# Patient Record
Sex: FEMALE | Race: WHITE | NOT HISPANIC OR LATINO | Employment: FULL TIME | URBAN - METROPOLITAN AREA
[De-identification: names, ages, dates, MRNs, and addresses within clinical notes are randomized per-mention and may not be internally consistent; named-entity substitution may affect disease eponyms.]

---

## 2024-01-10 ENCOUNTER — HOSPITAL ENCOUNTER (INPATIENT)
Facility: HOSPITAL | Age: 59
LOS: 14 days | Discharge: HOME | DRG: 020 | End: 2024-01-24
Attending: PSYCHIATRY & NEUROLOGY | Admitting: PSYCHIATRY & NEUROLOGY
Payer: COMMERCIAL

## 2024-01-10 DIAGNOSIS — I67.1 CEREBRAL ANEURYSM: ICD-10-CM

## 2024-01-10 DIAGNOSIS — I67.1 ANTERIOR COMMUNICATING ARTERY ANEURYSM: Primary | ICD-10-CM

## 2024-01-10 DIAGNOSIS — I60.9 SAH (SUBARACHNOID HEMORRHAGE): ICD-10-CM

## 2024-01-10 PROBLEM — F33.41 RECURRENT MAJOR DEPRESSION IN PARTIAL REMISSION: Status: ACTIVE | Noted: 2024-01-10

## 2024-01-10 PROBLEM — E03.9 ACQUIRED HYPOTHYROIDISM: Status: ACTIVE | Noted: 2024-01-10

## 2024-01-10 PROBLEM — J45.20 MILD INTERMITTENT ASTHMA WITHOUT COMPLICATION: Status: ACTIVE | Noted: 2024-01-10

## 2024-01-10 PROBLEM — I10 ESSENTIAL HYPERTENSION: Status: ACTIVE | Noted: 2024-01-10

## 2024-01-10 PROBLEM — G44.53 THUNDERCLAP HEADACHE: Status: ACTIVE | Noted: 2024-01-10

## 2024-01-10 LAB
ALBUMIN SERPL BCP-MCNC: 3.6 G/DL (ref 3.5–5.2)
ALP SERPL-CCNC: 104 U/L (ref 55–135)
ALT SERPL W/O P-5'-P-CCNC: 80 U/L (ref 10–44)
ANION GAP SERPL CALC-SCNC: 11 MMOL/L (ref 8–16)
AST SERPL-CCNC: 68 U/L (ref 10–40)
BASOPHILS # BLD AUTO: 0.07 K/UL (ref 0–0.2)
BASOPHILS NFR BLD: 0.4 % (ref 0–1.9)
BILIRUB SERPL-MCNC: 0.4 MG/DL (ref 0.1–1)
BUN SERPL-MCNC: 13 MG/DL (ref 6–20)
CALCIUM SERPL-MCNC: 8.6 MG/DL (ref 8.7–10.5)
CHLORIDE SERPL-SCNC: 112 MMOL/L (ref 95–110)
CHOLEST SERPL-MCNC: 209 MG/DL (ref 120–199)
CHOLEST/HDLC SERPL: 5.2 {RATIO} (ref 2–5)
CO2 SERPL-SCNC: 16 MMOL/L (ref 23–29)
CREAT SERPL-MCNC: 0.6 MG/DL (ref 0.5–1.4)
DIFFERENTIAL METHOD BLD: ABNORMAL
EOSINOPHIL # BLD AUTO: 0 K/UL (ref 0–0.5)
EOSINOPHIL NFR BLD: 0.1 % (ref 0–8)
ERYTHROCYTE [DISTWIDTH] IN BLOOD BY AUTOMATED COUNT: 13.8 % (ref 11.5–14.5)
EST. GFR  (NO RACE VARIABLE): >60 ML/MIN/1.73 M^2
GLUCOSE SERPL-MCNC: 144 MG/DL (ref 70–110)
HCT VFR BLD AUTO: 43.7 % (ref 37–48.5)
HDLC SERPL-MCNC: 40 MG/DL (ref 40–75)
HDLC SERPL: 19.1 % (ref 20–50)
HGB BLD-MCNC: 14.7 G/DL (ref 12–16)
IMM GRANULOCYTES # BLD AUTO: 0.05 K/UL (ref 0–0.04)
IMM GRANULOCYTES NFR BLD AUTO: 0.3 % (ref 0–0.5)
LDLC SERPL CALC-MCNC: 131.2 MG/DL (ref 63–159)
LYMPHOCYTES # BLD AUTO: 1.4 K/UL (ref 1–4.8)
LYMPHOCYTES NFR BLD: 7.9 % (ref 18–48)
MAGNESIUM SERPL-MCNC: 2.1 MG/DL (ref 1.6–2.6)
MCH RBC QN AUTO: 29.6 PG (ref 27–31)
MCHC RBC AUTO-ENTMCNC: 33.6 G/DL (ref 32–36)
MCV RBC AUTO: 88 FL (ref 82–98)
MONOCYTES # BLD AUTO: 1 K/UL (ref 0.3–1)
MONOCYTES NFR BLD: 5.8 % (ref 4–15)
NEUTROPHILS # BLD AUTO: 15.2 K/UL (ref 1.8–7.7)
NEUTROPHILS NFR BLD: 85.5 % (ref 38–73)
NONHDLC SERPL-MCNC: 169 MG/DL
NRBC BLD-RTO: 0 /100 WBC
PHOSPHATE SERPL-MCNC: 2.6 MG/DL (ref 2.7–4.5)
PLATELET # BLD AUTO: 292 K/UL (ref 150–450)
PMV BLD AUTO: 11.2 FL (ref 9.2–12.9)
POCT GLUCOSE: 141 MG/DL (ref 70–110)
POTASSIUM SERPL-SCNC: 3.4 MMOL/L (ref 3.5–5.1)
PROT SERPL-MCNC: 7.3 G/DL (ref 6–8.4)
RBC # BLD AUTO: 4.97 M/UL (ref 4–5.4)
SODIUM SERPL-SCNC: 139 MMOL/L (ref 136–145)
TRIGL SERPL-MCNC: 189 MG/DL (ref 30–150)
WBC # BLD AUTO: 17.79 K/UL (ref 3.9–12.7)

## 2024-01-10 PROCEDURE — 84439 ASSAY OF FREE THYROXINE: CPT

## 2024-01-10 PROCEDURE — 25000003 PHARM REV CODE 250

## 2024-01-10 PROCEDURE — 93005 ELECTROCARDIOGRAM TRACING: CPT

## 2024-01-10 PROCEDURE — 63600175 PHARM REV CODE 636 W HCPCS

## 2024-01-10 PROCEDURE — 86901 BLOOD TYPING SEROLOGIC RH(D): CPT

## 2024-01-10 PROCEDURE — 93010 ELECTROCARDIOGRAM REPORT: CPT | Mod: ,,, | Performed by: INTERNAL MEDICINE

## 2024-01-10 PROCEDURE — 20000000 HC ICU ROOM

## 2024-01-10 PROCEDURE — 80053 COMPREHEN METABOLIC PANEL: CPT

## 2024-01-10 PROCEDURE — 84443 ASSAY THYROID STIM HORMONE: CPT

## 2024-01-10 PROCEDURE — 80061 LIPID PANEL: CPT

## 2024-01-10 PROCEDURE — 99291 CRITICAL CARE FIRST HOUR: CPT | Mod: ,,,

## 2024-01-10 PROCEDURE — 85025 COMPLETE CBC W/AUTO DIFF WBC: CPT

## 2024-01-10 PROCEDURE — 84100 ASSAY OF PHOSPHORUS: CPT

## 2024-01-10 PROCEDURE — 83735 ASSAY OF MAGNESIUM: CPT

## 2024-01-10 PROCEDURE — 99000 SPECIMEN HANDLING OFFICE-LAB: CPT

## 2024-01-10 PROCEDURE — 83036 HEMOGLOBIN GLYCOSYLATED A1C: CPT

## 2024-01-10 RX ORDER — LABETALOL HCL 20 MG/4 ML
10 SYRINGE (ML) INTRAVENOUS EVERY 4 HOURS PRN
Status: DISCONTINUED | OUTPATIENT
Start: 2024-01-10 | End: 2024-01-11

## 2024-01-10 RX ORDER — LEVETIRACETAM 500 MG/1
500 TABLET ORAL 2 TIMES DAILY
Status: DISCONTINUED | OUTPATIENT
Start: 2024-01-10 | End: 2024-01-15

## 2024-01-10 RX ORDER — ACETAMINOPHEN 500 MG
1000 TABLET ORAL EVERY 6 HOURS PRN
Status: DISCONTINUED | OUTPATIENT
Start: 2024-01-10 | End: 2024-01-11

## 2024-01-10 RX ORDER — METOCLOPRAMIDE HYDROCHLORIDE 5 MG/ML
10 INJECTION INTRAMUSCULAR; INTRAVENOUS ONCE
Status: COMPLETED | OUTPATIENT
Start: 2024-01-10 | End: 2024-01-10

## 2024-01-10 RX ORDER — HYDRALAZINE HYDROCHLORIDE 20 MG/ML
10 INJECTION INTRAMUSCULAR; INTRAVENOUS EVERY 4 HOURS PRN
Status: DISCONTINUED | OUTPATIENT
Start: 2024-01-10 | End: 2024-01-11

## 2024-01-10 RX ORDER — GABAPENTIN 400 MG/1
400 CAPSULE ORAL EVERY 8 HOURS
Status: DISCONTINUED | OUTPATIENT
Start: 2024-01-11 | End: 2024-01-13

## 2024-01-10 RX ORDER — FENTANYL CITRATE 50 UG/ML
25 INJECTION, SOLUTION INTRAMUSCULAR; INTRAVENOUS ONCE
Status: COMPLETED | OUTPATIENT
Start: 2024-01-10 | End: 2024-01-10

## 2024-01-10 RX ORDER — SODIUM CHLORIDE 0.9 % (FLUSH) 0.9 %
10 SYRINGE (ML) INJECTION
Status: DISCONTINUED | OUTPATIENT
Start: 2024-01-10 | End: 2024-01-11

## 2024-01-10 RX ORDER — NIMODIPINE 30 MG/1
60 CAPSULE, LIQUID FILLED ORAL EVERY 4 HOURS
Status: DISCONTINUED | OUTPATIENT
Start: 2024-01-11 | End: 2024-01-24 | Stop reason: HOSPADM

## 2024-01-10 RX ORDER — AMOXICILLIN 250 MG
1 CAPSULE ORAL DAILY
Status: DISCONTINUED | OUTPATIENT
Start: 2024-01-11 | End: 2024-01-18

## 2024-01-10 RX ORDER — IPRATROPIUM BROMIDE AND ALBUTEROL SULFATE 2.5; .5 MG/3ML; MG/3ML
3 SOLUTION RESPIRATORY (INHALATION) EVERY 6 HOURS PRN
Status: DISCONTINUED | OUTPATIENT
Start: 2024-01-11 | End: 2024-01-12

## 2024-01-10 RX ORDER — MAGNESIUM SULFATE HEPTAHYDRATE 40 MG/ML
2 INJECTION, SOLUTION INTRAVENOUS ONCE
Status: COMPLETED | OUTPATIENT
Start: 2024-01-10 | End: 2024-01-11

## 2024-01-10 RX ORDER — HYDROMORPHONE HYDROCHLORIDE 1 MG/ML
2 INJECTION, SOLUTION INTRAMUSCULAR; INTRAVENOUS; SUBCUTANEOUS ONCE
Status: COMPLETED | OUTPATIENT
Start: 2024-01-11 | End: 2024-01-11

## 2024-01-10 RX ORDER — LEVOTHYROXINE SODIUM 25 UG/1
25 TABLET ORAL
Status: DISCONTINUED | OUTPATIENT
Start: 2024-01-11 | End: 2024-01-24 | Stop reason: HOSPADM

## 2024-01-10 RX ORDER — NICARDIPINE HYDROCHLORIDE 0.2 MG/ML
INJECTION INTRAVENOUS
Status: COMPLETED
Start: 2024-01-10 | End: 2024-01-11

## 2024-01-10 RX ORDER — NICARDIPINE HYDROCHLORIDE 0.2 MG/ML
0-15 INJECTION INTRAVENOUS CONTINUOUS
Status: DISCONTINUED | OUTPATIENT
Start: 2024-01-11 | End: 2024-01-11

## 2024-01-10 RX ORDER — ACETAMINOPHEN 325 MG/1
650 TABLET ORAL EVERY 6 HOURS PRN
Status: DISCONTINUED | OUTPATIENT
Start: 2024-01-10 | End: 2024-01-10

## 2024-01-10 RX ADMIN — HYDRALAZINE HYDROCHLORIDE 10 MG: 20 INJECTION, SOLUTION INTRAMUSCULAR; INTRAVENOUS at 10:01

## 2024-01-10 RX ADMIN — LABETALOL HYDROCHLORIDE 10 MG: 5 INJECTION, SOLUTION INTRAVENOUS at 11:01

## 2024-01-10 RX ADMIN — SODIUM CHLORIDE 500 ML: 9 INJECTION, SOLUTION INTRAVENOUS at 11:01

## 2024-01-10 RX ADMIN — MAGNESIUM SULFATE HEPTAHYDRATE 2 G: 40 INJECTION, SOLUTION INTRAVENOUS at 11:01

## 2024-01-10 RX ADMIN — LEVETIRACETAM 500 MG: 500 TABLET, FILM COATED ORAL at 11:01

## 2024-01-10 RX ADMIN — METOCLOPRAMIDE 10 MG: 5 INJECTION, SOLUTION INTRAMUSCULAR; INTRAVENOUS at 11:01

## 2024-01-10 RX ADMIN — FENTANYL CITRATE 25 MCG: 50 INJECTION INTRAMUSCULAR; INTRAVENOUS at 10:01

## 2024-01-11 ENCOUNTER — ANESTHESIA (OUTPATIENT)
Dept: INTERVENTIONAL RADIOLOGY/VASCULAR | Facility: HOSPITAL | Age: 59
DRG: 020 | End: 2024-01-11
Payer: COMMERCIAL

## 2024-01-11 LAB
ABO + RH BLD: NORMAL
ALBUMIN SERPL BCP-MCNC: 3.4 G/DL (ref 3.5–5.2)
ALP SERPL-CCNC: 104 U/L (ref 55–135)
ALT SERPL W/O P-5'-P-CCNC: 80 U/L (ref 10–44)
ANION GAP SERPL CALC-SCNC: 8 MMOL/L (ref 8–16)
APTT PPP: 24 SEC (ref 21–32)
ASCENDING AORTA: 3.5 CM
AST SERPL-CCNC: 63 U/L (ref 10–40)
AV INDEX (PROSTH): 0.72
AV MEAN GRADIENT: 14 MMHG
AV PEAK GRADIENT: 27 MMHG
AV VALVE AREA BY VELOCITY RATIO: 2.6 CM²
AV VALVE AREA: 2.38 CM²
AV VELOCITY RATIO: 0.79
BASOPHILS # BLD AUTO: 0.05 K/UL (ref 0–0.2)
BASOPHILS NFR BLD: 0.3 % (ref 0–1.9)
BILIRUB SERPL-MCNC: 0.5 MG/DL (ref 0.1–1)
BILIRUB UR QL STRIP: NEGATIVE
BLD GP AB SCN CELLS X3 SERPL QL: NORMAL
BSA FOR ECHO PROCEDURE: 2.14 M2
BUN SERPL-MCNC: 12 MG/DL (ref 6–20)
CALCIUM SERPL-MCNC: 8.6 MG/DL (ref 8.7–10.5)
CHLORIDE SERPL-SCNC: 110 MMOL/L (ref 95–110)
CLARITY UR REFRACT.AUTO: CLEAR
CO2 SERPL-SCNC: 18 MMOL/L (ref 23–29)
COLOR UR AUTO: YELLOW
CREAT SERPL-MCNC: 0.6 MG/DL (ref 0.5–1.4)
CV ECHO LV RWT: 0.5 CM
DIFFERENTIAL METHOD BLD: ABNORMAL
DOP CALC AO PEAK VEL: 2.6 M/S
DOP CALC AO VTI: 39.7 CM
DOP CALC LVOT AREA: 3.3 CM2
DOP CALC LVOT DIAMETER: 2.05 CM
DOP CALC LVOT PEAK VEL: 2.05 M/S
DOP CALC LVOT STROKE VOLUME: 94.35 CM3
DOP CALCLVOT PEAK VEL VTI: 28.6 CM
E WAVE DECELERATION TIME: 265.89 MSEC
E/A RATIO: 0.91
E/E' RATIO: 10.11 M/S
ECHO LV POSTERIOR WALL: 1.2 CM (ref 0.6–1.1)
EJECTION FRACTION: 73 %
EOSINOPHIL # BLD AUTO: 0 K/UL (ref 0–0.5)
EOSINOPHIL NFR BLD: 0.1 % (ref 0–8)
ERYTHROCYTE [DISTWIDTH] IN BLOOD BY AUTOMATED COUNT: 13.8 % (ref 11.5–14.5)
EST. GFR  (NO RACE VARIABLE): >60 ML/MIN/1.73 M^2
ESTIMATED AVG GLUCOSE: 120 MG/DL (ref 68–131)
FRACTIONAL SHORTENING: 73 % (ref 28–44)
GLUCOSE SERPL-MCNC: 122 MG/DL (ref 70–110)
GLUCOSE UR QL STRIP: ABNORMAL
HBA1C MFR BLD: 5.8 % (ref 4–5.6)
HCT VFR BLD AUTO: 43.3 % (ref 37–48.5)
HGB BLD-MCNC: 13.9 G/DL (ref 12–16)
HGB UR QL STRIP: NEGATIVE
IMM GRANULOCYTES # BLD AUTO: 0.08 K/UL (ref 0–0.04)
IMM GRANULOCYTES NFR BLD AUTO: 0.5 % (ref 0–0.5)
INR PPP: 1 (ref 0.8–1.2)
INTERVENTRICULAR SEPTUM: 1 CM (ref 0.6–1.1)
IVC DIAMETER: 2.1 CM
KETONES UR QL STRIP: ABNORMAL
LA MAJOR: 5.89 CM
LA MINOR: 5.92 CM
LA WIDTH: 4.33 CM
LEFT ATRIUM SIZE: 4.01 CM
LEFT ATRIUM VOLUME INDEX: 41.9 ML/M2
LEFT ATRIUM VOLUME: 87.15 CM3
LEFT INTERNAL DIMENSION IN SYSTOLE: 1.3 CM (ref 2.1–4)
LEFT VENTRICLE DIASTOLIC VOLUME INDEX: 54.25 ML/M2
LEFT VENTRICLE DIASTOLIC VOLUME: 112.84 ML
LEFT VENTRICLE MASS INDEX: 93 G/M2
LEFT VENTRICLE SYSTOLIC VOLUME INDEX: 11.7 ML/M2
LEFT VENTRICLE SYSTOLIC VOLUME: 24.41 ML
LEFT VENTRICULAR INTERNAL DIMENSION IN DIASTOLE: 4.8 CM (ref 3.5–6)
LEFT VENTRICULAR MASS: 193.96 G
LEUKOCYTE ESTERASE UR QL STRIP: NEGATIVE
LV LATERAL E/E' RATIO: 9.6 M/S
LV SEPTAL E/E' RATIO: 10.67 M/S
LYMPHOCYTES # BLD AUTO: 1.9 K/UL (ref 1–4.8)
LYMPHOCYTES NFR BLD: 12.1 % (ref 18–48)
MAGNESIUM SERPL-MCNC: 2.4 MG/DL (ref 1.6–2.6)
MCH RBC QN AUTO: 28.5 PG (ref 27–31)
MCHC RBC AUTO-ENTMCNC: 32.1 G/DL (ref 32–36)
MCV RBC AUTO: 89 FL (ref 82–98)
MONOCYTES # BLD AUTO: 1.3 K/UL (ref 0.3–1)
MONOCYTES NFR BLD: 7.9 % (ref 4–15)
MV PEAK A VEL: 1.06 M/S
MV PEAK E VEL: 0.96 M/S
MV STENOSIS PRESSURE HALF TIME: 77.11 MS
MV VALVE AREA P 1/2 METHOD: 2.85 CM2
NEUTROPHILS # BLD AUTO: 12.5 K/UL (ref 1.8–7.7)
NEUTROPHILS NFR BLD: 79.1 % (ref 38–73)
NITRITE UR QL STRIP: NEGATIVE
NRBC BLD-RTO: 0 /100 WBC
PH UR STRIP: 6 [PH] (ref 5–8)
PHOSPHATE SERPL-MCNC: 3 MG/DL (ref 2.7–4.5)
PHOSPHATE SERPL-MCNC: 3 MG/DL (ref 2.7–4.5)
PISA TR MAX VEL: 2.2 M/S
PLATELET # BLD AUTO: 266 K/UL (ref 150–450)
PMV BLD AUTO: 10.4 FL (ref 9.2–12.9)
POCT GLUCOSE: 124 MG/DL (ref 70–110)
POTASSIUM SERPL-SCNC: 3.2 MMOL/L (ref 3.5–5.1)
PROT SERPL-MCNC: 6.9 G/DL (ref 6–8.4)
PROT UR QL STRIP: NEGATIVE
PROTHROMBIN TIME: 10.6 SEC (ref 9–12.5)
RA MAJOR: 4.14 CM
RA PRESSURE ESTIMATED: 3 MMHG
RA WIDTH: 2.99 CM
RBC # BLD AUTO: 4.87 M/UL (ref 4–5.4)
RIGHT VENTRICULAR END-DIASTOLIC DIMENSION: 2.96 CM
RV TB RVSP: 5 MMHG
SINUS: 2.74 CM
SODIUM SERPL-SCNC: 136 MMOL/L (ref 136–145)
SP GR UR STRIP: 1.02 (ref 1–1.03)
SPECIMEN OUTDATE: NORMAL
STJ: 2.6 CM
T4 FREE SERPL-MCNC: 0.95 NG/DL (ref 0.71–1.51)
TASV: 19 CM/S
TDI LATERAL: 0.1 M/S
TDI SEPTAL: 0.09 M/S
TDI: 0.1 M/S
TR MAX PG: 19 MMHG
TSH SERPL DL<=0.005 MIU/L-ACNC: <0.01 UIU/ML (ref 0.4–4)
TV REST PULMONARY ARTERY PRESSURE: 22 MMHG
URN SPEC COLLECT METH UR: ABNORMAL
WBC # BLD AUTO: 15.83 K/UL (ref 3.9–12.7)
Z-SCORE OF LEFT VENTRICULAR DIMENSION IN END DIASTOLE: -2.83
Z-SCORE OF LEFT VENTRICULAR DIMENSION IN END SYSTOLE: -8.68

## 2024-01-11 PROCEDURE — 27000221 HC OXYGEN, UP TO 24 HOURS

## 2024-01-11 PROCEDURE — 25000003 PHARM REV CODE 250: Performed by: NURSE ANESTHETIST, CERTIFIED REGISTERED

## 2024-01-11 PROCEDURE — 83735 ASSAY OF MAGNESIUM: CPT

## 2024-01-11 PROCEDURE — 85025 COMPLETE CBC W/AUTO DIFF WBC: CPT

## 2024-01-11 PROCEDURE — B318YZZ FLUOROSCOPY OF BILATERAL INTERNAL CAROTID ARTERIES USING OTHER CONTRAST: ICD-10-PCS | Performed by: NEUROLOGICAL SURGERY

## 2024-01-11 PROCEDURE — 27201037 HC PRESSURE MONITORING SET UP

## 2024-01-11 PROCEDURE — 99223 1ST HOSP IP/OBS HIGH 75: CPT | Mod: ,,, | Performed by: NEUROLOGICAL SURGERY

## 2024-01-11 PROCEDURE — 63600175 PHARM REV CODE 636 W HCPCS

## 2024-01-11 PROCEDURE — 36415 COLL VENOUS BLD VENIPUNCTURE: CPT | Performed by: PSYCHIATRY & NEUROLOGY

## 2024-01-11 PROCEDURE — 25500020 PHARM REV CODE 255: Performed by: PSYCHIATRY & NEUROLOGY

## 2024-01-11 PROCEDURE — 85730 THROMBOPLASTIN TIME PARTIAL: CPT

## 2024-01-11 PROCEDURE — 51701 INSERT BLADDER CATHETER: CPT

## 2024-01-11 PROCEDURE — 25000003 PHARM REV CODE 250

## 2024-01-11 PROCEDURE — 99900035 HC TECH TIME PER 15 MIN (STAT)

## 2024-01-11 PROCEDURE — 03LG3BZ OCCLUSION OF INTRACRANIAL ARTERY WITH BIOACTIVE INTRALUMINAL DEVICE, PERCUTANEOUS APPROACH: ICD-10-PCS | Performed by: NEUROLOGICAL SURGERY

## 2024-01-11 PROCEDURE — 63600175 PHARM REV CODE 636 W HCPCS: Performed by: NURSE ANESTHETIST, CERTIFIED REGISTERED

## 2024-01-11 PROCEDURE — 25000003 PHARM REV CODE 250: Performed by: PSYCHIATRY & NEUROLOGY

## 2024-01-11 PROCEDURE — 20000000 HC ICU ROOM

## 2024-01-11 PROCEDURE — 99223 1ST HOSP IP/OBS HIGH 75: CPT | Mod: ,,, | Performed by: PSYCHIATRY & NEUROLOGY

## 2024-01-11 PROCEDURE — 85610 PROTHROMBIN TIME: CPT

## 2024-01-11 PROCEDURE — B315YZZ FLUOROSCOPY OF BILATERAL COMMON CAROTID ARTERIES USING OTHER CONTRAST: ICD-10-PCS | Performed by: NEUROLOGICAL SURGERY

## 2024-01-11 PROCEDURE — B31FYZZ FLUOROSCOPY OF LEFT VERTEBRAL ARTERY USING OTHER CONTRAST: ICD-10-PCS | Performed by: NEUROLOGICAL SURGERY

## 2024-01-11 PROCEDURE — 99291 CRITICAL CARE FIRST HOUR: CPT | Mod: ,,, | Performed by: PSYCHIATRY & NEUROLOGY

## 2024-01-11 PROCEDURE — 81003 URINALYSIS AUTO W/O SCOPE: CPT

## 2024-01-11 PROCEDURE — 80053 COMPREHEN METABOLIC PANEL: CPT

## 2024-01-11 PROCEDURE — 94761 N-INVAS EAR/PLS OXIMETRY MLT: CPT

## 2024-01-11 PROCEDURE — 84100 ASSAY OF PHOSPHORUS: CPT

## 2024-01-11 PROCEDURE — 25000003 PHARM REV CODE 250: Performed by: NEUROLOGICAL SURGERY

## 2024-01-11 RX ORDER — MAGNESIUM SULFATE HEPTAHYDRATE 40 MG/ML
2 INJECTION, SOLUTION INTRAVENOUS ONCE
Status: COMPLETED | OUTPATIENT
Start: 2024-01-11 | End: 2024-01-11

## 2024-01-11 RX ORDER — SODIUM,POTASSIUM PHOSPHATES 280-250MG
2 POWDER IN PACKET (EA) ORAL
Status: DISCONTINUED | OUTPATIENT
Start: 2024-01-11 | End: 2024-01-23

## 2024-01-11 RX ORDER — SODIUM CHLORIDE 0.9 % (FLUSH) 0.9 %
10 SYRINGE (ML) INJECTION
Status: DISCONTINUED | OUTPATIENT
Start: 2024-01-11 | End: 2024-01-12

## 2024-01-11 RX ORDER — MORPHINE SULFATE 2 MG/ML
2 INJECTION, SOLUTION INTRAMUSCULAR; INTRAVENOUS EVERY 4 HOURS PRN
Status: DISCONTINUED | OUTPATIENT
Start: 2024-01-11 | End: 2024-01-12

## 2024-01-11 RX ORDER — LIDOCAINE HYDROCHLORIDE 20 MG/ML
INJECTION INTRAVENOUS
Status: DISCONTINUED | OUTPATIENT
Start: 2024-01-11 | End: 2024-01-11

## 2024-01-11 RX ORDER — OXYCODONE HYDROCHLORIDE 5 MG/1
5 TABLET ORAL EVERY 6 HOURS PRN
Status: DISCONTINUED | OUTPATIENT
Start: 2024-01-11 | End: 2024-01-17

## 2024-01-11 RX ORDER — FENTANYL CITRATE 50 UG/ML
INJECTION, SOLUTION INTRAMUSCULAR; INTRAVENOUS
Status: DISCONTINUED | OUTPATIENT
Start: 2024-01-11 | End: 2024-01-11

## 2024-01-11 RX ORDER — LANOLIN ALCOHOL/MO/W.PET/CERES
800 CREAM (GRAM) TOPICAL
Status: DISCONTINUED | OUTPATIENT
Start: 2024-01-11 | End: 2024-01-23

## 2024-01-11 RX ORDER — ACETAMINOPHEN 500 MG
1000 TABLET ORAL EVERY 8 HOURS PRN
Status: DISCONTINUED | OUTPATIENT
Start: 2024-01-11 | End: 2024-01-22

## 2024-01-11 RX ORDER — METOCLOPRAMIDE HYDROCHLORIDE 5 MG/ML
10 INJECTION INTRAMUSCULAR; INTRAVENOUS ONCE
Status: COMPLETED | OUTPATIENT
Start: 2024-01-11 | End: 2024-01-11

## 2024-01-11 RX ORDER — DEXAMETHASONE SODIUM PHOSPHATE 4 MG/ML
8 INJECTION, SOLUTION INTRA-ARTICULAR; INTRALESIONAL; INTRAMUSCULAR; INTRAVENOUS; SOFT TISSUE ONCE
Status: COMPLETED | OUTPATIENT
Start: 2024-01-11 | End: 2024-01-11

## 2024-01-11 RX ORDER — ROCURONIUM BROMIDE 10 MG/ML
INJECTION, SOLUTION INTRAVENOUS
Status: DISCONTINUED | OUTPATIENT
Start: 2024-01-11 | End: 2024-01-11

## 2024-01-11 RX ORDER — DEXAMETHASONE SODIUM PHOSPHATE 4 MG/ML
10 INJECTION, SOLUTION INTRA-ARTICULAR; INTRALESIONAL; INTRAMUSCULAR; INTRAVENOUS; SOFT TISSUE ONCE
Status: COMPLETED | OUTPATIENT
Start: 2024-01-12 | End: 2024-01-11

## 2024-01-11 RX ORDER — DEXAMETHASONE SODIUM PHOSPHATE 4 MG/ML
INJECTION, SOLUTION INTRA-ARTICULAR; INTRALESIONAL; INTRAMUSCULAR; INTRAVENOUS; SOFT TISSUE
Status: DISCONTINUED | OUTPATIENT
Start: 2024-01-11 | End: 2024-01-11

## 2024-01-11 RX ORDER — HYDROMORPHONE HYDROCHLORIDE 1 MG/ML
1 INJECTION, SOLUTION INTRAMUSCULAR; INTRAVENOUS; SUBCUTANEOUS EVERY 6 HOURS PRN
Status: DISCONTINUED | OUTPATIENT
Start: 2024-01-11 | End: 2024-01-11

## 2024-01-11 RX ORDER — PROPOFOL 10 MG/ML
VIAL (ML) INTRAVENOUS
Status: DISCONTINUED | OUTPATIENT
Start: 2024-01-11 | End: 2024-01-11

## 2024-01-11 RX ORDER — NICARDIPINE HYDROCHLORIDE 0.2 MG/ML
0-15 INJECTION INTRAVENOUS CONTINUOUS
Status: DISCONTINUED | OUTPATIENT
Start: 2024-01-11 | End: 2024-01-12

## 2024-01-11 RX ORDER — SODIUM CHLORIDE 9 MG/ML
INJECTION, SOLUTION INTRAVENOUS CONTINUOUS
Status: DISCONTINUED | OUTPATIENT
Start: 2024-01-11 | End: 2024-01-12

## 2024-01-11 RX ORDER — GLUCAGON 1 MG
1 KIT INJECTION
Status: DISCONTINUED | OUTPATIENT
Start: 2024-01-11 | End: 2024-01-18

## 2024-01-11 RX ORDER — INSULIN ASPART 100 [IU]/ML
0-10 INJECTION, SOLUTION INTRAVENOUS; SUBCUTANEOUS EVERY 6 HOURS PRN
Status: DISCONTINUED | OUTPATIENT
Start: 2024-01-11 | End: 2024-01-15

## 2024-01-11 RX ORDER — VERAPAMIL HYDROCHLORIDE 2.5 MG/ML
INJECTION, SOLUTION INTRAVENOUS
Status: COMPLETED | OUTPATIENT
Start: 2024-01-11 | End: 2024-01-11

## 2024-01-11 RX ORDER — HYDROMORPHONE HYDROCHLORIDE 1 MG/ML
1 INJECTION, SOLUTION INTRAMUSCULAR; INTRAVENOUS; SUBCUTANEOUS ONCE
Status: DISCONTINUED | OUTPATIENT
Start: 2024-01-11 | End: 2024-01-11

## 2024-01-11 RX ORDER — HYDRALAZINE HYDROCHLORIDE 20 MG/ML
10 INJECTION INTRAMUSCULAR; INTRAVENOUS EVERY 4 HOURS PRN
Status: DISCONTINUED | OUTPATIENT
Start: 2024-01-11 | End: 2024-01-12

## 2024-01-11 RX ORDER — PHENYLEPHRINE HCL IN 0.9% NACL 1 MG/10 ML
SYRINGE (ML) INTRAVENOUS
Status: DISCONTINUED | OUTPATIENT
Start: 2024-01-11 | End: 2024-01-11

## 2024-01-11 RX ORDER — LABETALOL HCL 20 MG/4 ML
10 SYRINGE (ML) INTRAVENOUS EVERY 4 HOURS PRN
Status: DISCONTINUED | OUTPATIENT
Start: 2024-01-11 | End: 2024-01-12

## 2024-01-11 RX ORDER — MUPIROCIN 20 MG/G
OINTMENT TOPICAL 2 TIMES DAILY
Status: DISPENSED | OUTPATIENT
Start: 2024-01-11 | End: 2024-01-16

## 2024-01-11 RX ORDER — HEPARIN SODIUM 1000 [USP'U]/ML
INJECTION, SOLUTION INTRAVENOUS; SUBCUTANEOUS
Status: DISCONTINUED | OUTPATIENT
Start: 2024-01-11 | End: 2024-01-11

## 2024-01-11 RX ORDER — LEVETIRACETAM 500 MG/5ML
INJECTION, SOLUTION, CONCENTRATE INTRAVENOUS
Status: DISCONTINUED | OUTPATIENT
Start: 2024-01-11 | End: 2024-01-11

## 2024-01-11 RX ADMIN — IOHEXOL 100 ML: 350 INJECTION, SOLUTION INTRAVENOUS at 11:01

## 2024-01-11 RX ADMIN — NICARDIPINE HYDROCHLORIDE 2.5 MG/HR: 0.2 INJECTION, SOLUTION INTRAVENOUS at 12:01

## 2024-01-11 RX ADMIN — POTASSIUM BICARBONATE 35 MEQ: 391 TABLET, EFFERVESCENT ORAL at 07:01

## 2024-01-11 RX ADMIN — NIMODIPINE 60 MG: 30 CAPSULE, LIQUID FILLED ORAL at 05:01

## 2024-01-11 RX ADMIN — NICARDIPINE HYDROCHLORIDE 15 MG/HR: 0.2 INJECTION, SOLUTION INTRAVENOUS at 05:01

## 2024-01-11 RX ADMIN — METOCLOPRAMIDE 10 MG: 5 INJECTION, SOLUTION INTRAMUSCULAR; INTRAVENOUS at 09:01

## 2024-01-11 RX ADMIN — LEVETIRACETAM 500 MG: 500 TABLET, FILM COATED ORAL at 09:01

## 2024-01-11 RX ADMIN — DEXAMETHASONE SODIUM PHOSPHATE 10 MG: 4 INJECTION INTRA-ARTICULAR; INTRALESIONAL; INTRAMUSCULAR; INTRAVENOUS; SOFT TISSUE at 11:01

## 2024-01-11 RX ADMIN — ROCURONIUM BROMIDE 20 MG: 10 INJECTION, SOLUTION INTRAVENOUS at 01:01

## 2024-01-11 RX ADMIN — HEPARIN SODIUM 2000 UNITS: 1000 INJECTION, SOLUTION INTRAVENOUS; SUBCUTANEOUS at 03:01

## 2024-01-11 RX ADMIN — HEPARIN SODIUM 1000 UNITS: 1000 INJECTION, SOLUTION INTRAVENOUS; SUBCUTANEOUS at 02:01

## 2024-01-11 RX ADMIN — HYDRALAZINE HYDROCHLORIDE 10 MG: 20 INJECTION, SOLUTION INTRAMUSCULAR; INTRAVENOUS at 05:01

## 2024-01-11 RX ADMIN — GABAPENTIN 400 MG: 400 CAPSULE ORAL at 09:01

## 2024-01-11 RX ADMIN — GABAPENTIN 400 MG: 400 CAPSULE ORAL at 05:01

## 2024-01-11 RX ADMIN — HYDROMORPHONE HYDROCHLORIDE 1 MG: 1 INJECTION, SOLUTION INTRAMUSCULAR; INTRAVENOUS; SUBCUTANEOUS at 04:01

## 2024-01-11 RX ADMIN — INSULIN ASPART 2 UNITS: 100 INJECTION, SOLUTION INTRAVENOUS; SUBCUTANEOUS at 05:01

## 2024-01-11 RX ADMIN — PROPOFOL 100 MG: 10 INJECTION, EMULSION INTRAVENOUS at 11:01

## 2024-01-11 RX ADMIN — ACETAMINOPHEN 1000 MG: 500 TABLET ORAL at 12:01

## 2024-01-11 RX ADMIN — LEVETIRACETAM 1000 MG: 100 INJECTION INTRAVENOUS at 03:01

## 2024-01-11 RX ADMIN — MAGNESIUM SULFATE HEPTAHYDRATE 2 G: 40 INJECTION, SOLUTION INTRAVENOUS at 09:01

## 2024-01-11 RX ADMIN — ROCURONIUM BROMIDE 50 MG: 10 INJECTION, SOLUTION INTRAVENOUS at 11:01

## 2024-01-11 RX ADMIN — DEXAMETHASONE SODIUM PHOSPHATE 8 MG: 4 INJECTION INTRA-ARTICULAR; INTRALESIONAL; INTRAMUSCULAR; INTRAVENOUS; SOFT TISSUE at 10:01

## 2024-01-11 RX ADMIN — HYDROMORPHONE HYDROCHLORIDE 1 MG: 1 INJECTION, SOLUTION INTRAMUSCULAR; INTRAVENOUS; SUBCUTANEOUS at 05:01

## 2024-01-11 RX ADMIN — FENTANYL CITRATE 100 MCG: 50 INJECTION, SOLUTION INTRAMUSCULAR; INTRAVENOUS at 11:01

## 2024-01-11 RX ADMIN — ROCURONIUM BROMIDE 20 MG: 10 INJECTION, SOLUTION INTRAVENOUS at 12:01

## 2024-01-11 RX ADMIN — PROPOFOL 20 MG: 10 INJECTION, EMULSION INTRAVENOUS at 02:01

## 2024-01-11 RX ADMIN — Medication 100 MCG: at 02:01

## 2024-01-11 RX ADMIN — LEVOTHYROXINE SODIUM 25 MCG: 25 TABLET ORAL at 05:01

## 2024-01-11 RX ADMIN — NIMODIPINE 60 MG: 30 CAPSULE, LIQUID FILLED ORAL at 09:01

## 2024-01-11 RX ADMIN — NICARDIPINE HYDROCHLORIDE 10 MG/HR: 0.2 INJECTION, SOLUTION INTRAVENOUS at 09:01

## 2024-01-11 RX ADMIN — ACETAMINOPHEN 1000 MG: 500 TABLET ORAL at 08:01

## 2024-01-11 RX ADMIN — SODIUM CHLORIDE: 9 INJECTION, SOLUTION INTRAVENOUS at 10:01

## 2024-01-11 RX ADMIN — HEPARIN SODIUM 1000 UNITS: 1000 INJECTION, SOLUTION INTRAVENOUS; SUBCUTANEOUS at 12:01

## 2024-01-11 RX ADMIN — DEXAMETHASONE SODIUM PHOSPHATE 4 MG: 4 INJECTION, SOLUTION INTRAMUSCULAR; INTRAVENOUS at 12:01

## 2024-01-11 RX ADMIN — OXYCODONE HYDROCHLORIDE 5 MG: 5 TABLET ORAL at 07:01

## 2024-01-11 RX ADMIN — SENNOSIDES AND DOCUSATE SODIUM 1 TABLET: 50; 8.6 TABLET ORAL at 09:01

## 2024-01-11 RX ADMIN — ROCURONIUM BROMIDE 20 MG: 10 INJECTION, SOLUTION INTRAVENOUS at 02:01

## 2024-01-11 RX ADMIN — OXYCODONE HYDROCHLORIDE 5 MG: 5 TABLET ORAL at 08:01

## 2024-01-11 RX ADMIN — NICARDIPINE HYDROCHLORIDE 12.5 MG/HR: 0.2 INJECTION, SOLUTION INTRAVENOUS at 05:01

## 2024-01-11 RX ADMIN — MUPIROCIN: 20 OINTMENT TOPICAL at 09:01

## 2024-01-11 RX ADMIN — LIDOCAINE HYDROCHLORIDE 100 MG: 20 INJECTION INTRAVENOUS at 11:01

## 2024-01-11 RX ADMIN — NIMODIPINE 60 MG: 30 CAPSULE, LIQUID FILLED ORAL at 02:01

## 2024-01-11 RX ADMIN — NIMODIPINE 60 MG: 30 CAPSULE, LIQUID FILLED ORAL at 06:01

## 2024-01-11 RX ADMIN — Medication 100 MCG: at 12:01

## 2024-01-11 RX ADMIN — SUGAMMADEX 200 MG: 100 INJECTION, SOLUTION INTRAVENOUS at 03:01

## 2024-01-11 RX ADMIN — PROPOFOL 30 MG: 10 INJECTION, EMULSION INTRAVENOUS at 12:01

## 2024-01-11 RX ADMIN — LABETALOL HYDROCHLORIDE 10 MG: 5 INJECTION, SOLUTION INTRAVENOUS at 06:01

## 2024-01-11 RX ADMIN — SODIUM CHLORIDE 500 ML: 9 INJECTION, SOLUTION INTRAVENOUS at 09:01

## 2024-01-11 RX ADMIN — HYDROMORPHONE HYDROCHLORIDE 2 MG: 1 INJECTION, SOLUTION INTRAMUSCULAR; INTRAVENOUS; SUBCUTANEOUS at 12:01

## 2024-01-11 RX ADMIN — SODIUM CHLORIDE: 0.9 INJECTION, SOLUTION INTRAVENOUS at 11:01

## 2024-01-11 RX ADMIN — VERAPAMIL HYDROCHLORIDE 10 MG: 2.5 INJECTION, SOLUTION INTRAVENOUS at 03:01

## 2024-01-11 RX ADMIN — ACETAMINOPHEN 1000 MG: 500 TABLET ORAL at 07:01

## 2024-01-11 NOTE — HOSPITAL COURSE
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 Worsening HA w transient L sided numbness overnight. CTH stable. TCDs pending. Persistent 10/10 HA. Gabapentin changed to lyrica 150 BID. 500NS bolus given for euvolemia.   01/14/2024 Improved HA overnight to 7/10, 10/10 again this AM, Increased lyrica to 225BID. Remains net -3L this AM despite aggressive fluid replacement overnight of 3L total, high UOP. Given 1L NS and started on fludrocortisone 100BID. Will reassess volume status at noon for further replacement. TCDs pending.  01/15/2024 Na briefly low at 132, uptrending to 137 on recheck. C/o persistent HA and photophobia. D/c'd keppra given pt aneurysm secured.   01/16/2024 Na stable overnight, space out checks to q12hrs. TCDs wnl. Methocarbamol increased to 750 mg QID given persistent HA  01/17/2024 NAEON, TCDs stable, serum Na stable. D/c'd IV morphine, transitioned to oral pain regimen   01/18/2024 PBD 14. TCDs pending. MRA w vessel wall imaging pending clearance of cochlear implant.   01/19/2024 MRA w/ no evidence of residual filling of coiled Acomm aneurysm, small unruptured PICA aneurysm noted, outpt f/u. Serum Na stable, weaning fludrocortisone. Stable for transfer to floor

## 2024-01-11 NOTE — PLAN OF CARE
"[facility] Care Plan    POC reviewed with Loreto Goff and family at 0300. Pt verbalized understanding. Questions and concerns addressed. No acute events overnight. Pt progressing toward goals. Will continue to monitor. See below and flowsheets for full assessment and VS info.   - Pt with intractable HA on admit unrelieved by prn fentanyl and migraine cocktail >> 2mg dilaudid given   - prn hydralazine and labetalol given for sbp > 140  - Cardene gtt @ 2.5  - Pt maintained NPO for possible DSA today   - Per VINICIO Linares postpone MRA/ MRI   - prn dilaudid and oxy added for pain control             Is this a stroke patient? no    Neuro:  Fili Coma Scale  Best Eye Response: 4-->(E4) spontaneous  Best Motor Response: 6-->(M6) obeys commands  Best Verbal Response: 5-->(V5) oriented  Fili Coma Scale Score: 15  Assessment Qualifiers: patient not sedated/intubated  Pupil PERRLA: yes     24hr Temp:  [97.6 °F (36.4 °C)-98.5 °F (36.9 °C)]     CV:   Rhythm: normal sinus rhythm  BP goals:   SBP < 140  MAP > 65    Resp:           Plan: N/A    GI/:     Diet/Nutrition Received: NPO  Last Bowel Movement: 01/09/24  Voiding Characteristics: voids spontaneously without difficulty    Intake/Output Summary (Last 24 hours) at 1/11/2024 0426  Last data filed at 1/11/2024 0301  Gross per 24 hour   Intake 352.89 ml   Output 800 ml   Net -447.11 ml     Unmeasured Output  Stool Occurrence: 0    Labs/Accuchecks:  Recent Labs   Lab 01/10/24  2247   WBC 17.79*   RBC 4.97   HGB 14.7   HCT 43.7         Recent Labs   Lab 01/10/24  2247      K 3.4*   CO2 16*   *   BUN 13   CREATININE 0.6   ALKPHOS 104   ALT 80*   AST 68*   BILITOT 0.4    No results for input(s): "PROTIME", "INR", "APTT", "HEPANTIXA" in the last 168 hours. No results for input(s): "CPK", "CPKMB", "TROPONINI", "MB" in the last 168 hours.    Electrolytes: Electrolytes replaced  Accuchecks: Q6H    Gtts:   nicardipine Stopped (01/11/24 0258) "       LDA/Wounds:  Lines/Drains/Airways       Peripheral Intravenous Line  Duration                  Peripheral IV - Single Lumen 01/10/24 20 G Anterior;Left Forearm 1 day         Peripheral IV - Single Lumen 01/10/24 20 G Anterior;Right Hand 1 day         Peripheral IV - Single Lumen 01/10/24 20 G Right Antecubital 1 day                  Wounds: No  Wound care consulted: No

## 2024-01-11 NOTE — NURSING
1644: VINICIO Dietrich notified of patient's arterial line and blood pressure cuff not correlating, specifically the diastolic and Map. Orders to troubleshoot a-line.    1720: Arterial line and cuff pressures still not correlating after redressing arterial line and readjusting cuff.     1721: Arterial line - 148/51 Map 72            Cuff pressure - 143/63 Map 90   Cardene gtt at 15mg/hr    See VS flowsheets.     1815: SBP now <160 per NCC

## 2024-01-11 NOTE — ASSESSMENT & PLAN NOTE
-TSH <0.010 on admit  -Continue home synthroid  -Consider endocrinology consult for management/medication adjustment recs

## 2024-01-11 NOTE — CONSULTS
Interventional Neuroradiology Pre-procedure Note    Procedure: Diagnostic cerebral angiogram    History of Present Illness:  Loreto Goff is a 58 y.o. 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 PATRICIA is consulted for the diagnostic angiogram and treatment.     ROS:   Hematological: no known coagulopathies  Respiratory: no shortness of breath  Cardiovascular: no chest pain  Gastrointestinal: no abdominal pain  Genito-Urinary: no dysuria  Musculoskeletal: negative  Neurological: +headache     Scheduled Meds:    gabapentin  400 mg Oral Q8H    levETIRAcetam  500 mg Oral BID    levothyroxine  25 mcg Oral Before breakfast    niMODipine  60 mg Oral Q4H    senna-docusate 8.6-50 mg  1 tablet Oral Daily     Current Meds:   Current Facility-Administered Medications:     acetaminophen tablet 1,000 mg, 1,000 mg, Oral, Q6H PRN, Niclizz, Abbi S., PA-C, 1,000 mg at 01/11/24 0724    albuterol-ipratropium 2.5 mg-0.5 mg/3 mL nebulizer solution 3 mL, 3 mL, Nebulization, Q6H PRN, Nicaud, Abbi S., PA-C    dextrose 10% bolus 125 mL 125 mL, 12.5 g, Intravenous, PRN, Nicaud, Abbi S., PA-C    dextrose 10% bolus 250 mL 250 mL, 25 g, Intravenous, PRN, Nicaud, Abbi S., PA-C    gabapentin capsule 400 mg, 400 mg, Oral, Q8H, Nicaud, Abbi S., PA-C, 400 mg at 01/11/24 0507    glucagon (human recombinant) injection 1 mg, 1 mg, Intramuscular, PRN, Nicaud, Abbi S., PA-C    hydrALAZINE injection 10 mg, 10 mg, Intravenous, Q4H PRN, Nicaud, Abbi S., PA-C, 10 mg at 01/10/24 2219    HYDROmorphone injection 1 mg, 1 mg, Intravenous, Q6H PRN, Nicaud, Abbi S., PA-C, 1 mg at 01/11/24 0526    insulin aspart U-100 pen 0-10 Units, 0-10 Units, Subcutaneous, Q6H PRN, Abbi Linares, TOSHIA    labetalol 20 mg/4 mL (5 mg/mL) IV syring, 10 mg, Intravenous, Q4H PRN, Abbi Linares,  PA-C, 10 mg at 01/10/24 2355    levETIRAcetam tablet 500 mg, 500 mg, Oral, BID, Abbi Linares, PA-C, 500 mg at 01/10/24 2306    levothyroxine tablet 25 mcg, 25 mcg, Oral, Before breakfast, Abbi Linares S., PA-C, 25 mcg at 01/11/24 0507    magnesium oxide tablet 800 mg, 800 mg, Oral, PRN, Nicaud, Abbi S., PA-C    magnesium oxide tablet 800 mg, 800 mg, Oral, PRN, Nicaud, Abbi S., PA-C    niCARdipine 40 mg/200 mL (0.2 mg/mL) infusion, 0-15 mg/hr, Intravenous, Continuous, Abbi Linares PA-C, Last Rate: 12.5 mL/hr at 01/11/24 0701, 2.5 mg/hr at 01/11/24 0701    niMODipine capsule 60 mg, 60 mg, Oral, Q4H, Abbi Linares., PA-C, 60 mg at 01/11/24 0507    oxyCODONE immediate release tablet 5 mg, 5 mg, Oral, Q6H PRN, Niclizz, Abbi S., PA-C, 5 mg at 01/11/24 0724    potassium bicarbonate disintegrating tablet 35 mEq, 35 mEq, Oral, PRN, Nicaud, Abbi S., PA-C, 35 mEq at 01/11/24 0724    potassium bicarbonate disintegrating tablet 50 mEq, 50 mEq, Oral, PRN, Nicaud, Abbi S., PA-C    potassium bicarbonate disintegrating tablet 60 mEq, 60 mEq, Oral, PRN, Nicaud, Abbi S., PA-C    potassium, sodium phosphates 280-160-250 mg packet 2 packet, 2 packet, Oral, PRN, Nicaud, Abbi S., PA-C    potassium, sodium phosphates 280-160-250 mg packet 2 packet, 2 packet, Oral, PRN, Nicaud, Abbi S., PA-C    potassium, sodium phosphates 280-160-250 mg packet 2 packet, 2 packet, Oral, PRN, Nicaud, Abbi S., PA-C    senna-docusate 8.6-50 mg per tablet 1 tablet, 1 tablet, Oral, Daily, Nicaud, Abbi S., PA-C    sodium chloride 0.9% flush 10 mL, 10 mL, Intravenous, PRN, Abbi Linares PA-C   Continuous Infusions:    nicardipine 2.5 mg/hr (01/11/24 0701)     PRN Meds:acetaminophen, albuterol-ipratropium, dextrose 10%, dextrose 10%, glucagon (human recombinant), hydrALAZINE, HYDROmorphone, insulin aspart U-100, labetalol, magnesium oxide, magnesium oxide, oxyCODONE, potassium bicarbonate, potassium  "bicarbonate, potassium bicarbonate, potassium, sodium phosphates, potassium, sodium phosphates, potassium, sodium phosphates, sodium chloride 0.9%    Allergies: Review of patient's allergies indicates:  No Known Allergies  Sedation Hx: No adverse events.    Labs:  PT/INR/PTT:  10.6/1.0/24.0 (01/11 0552)  WBC/Hgb/Hct/Plts:  15.83/13.9/43.3/266 (01/11 0552)  BUN/Cr/glu/ALT/AST/amyl/lip:  12/0.6/--/80/63/--/-- (01/11 0552)     Objective:  Vitals: Blood pressure 135/63, pulse 76, temperature 98.7 °F (37.1 °C), temperature source Oral, resp. rate 20, height 5' 7.99" (1.727 m), weight 97.4 kg (214 lb 11.2 oz), SpO2 95 %.     Physical Exam:  General: well developed, well nourished, no distress.   Head: normocephalic, atraumatic  Neck: No tracheal deviation. No palpable masses. Full ROM.   Neurologic: Alert and oriented. Thought content appropriate.  GCS: E4 V5 M6; Total: 15  Language: No aphasia  Speech: No dysarthria  Cranial nerves: face symmetric, tongue midline, CN II-XII grossly intact.   Eyes: pupils equal, round, reactive to light with accomodation, EOMI.   Pulmonary: normal respirations, no signs of respiratory distress  Abdomen: soft, non-distended, not tender to palpation  Vascular: Pulses 2+ and symmetric radial and dorsalis pedis. No LE edema.   Skin: Skin is warm, dry and intact.  Sensory: intact to light touch throughout  Motor Strength: Moves all extremities spontaneously with good tone.  Full strength upper and lower extremities. No abnormal movements seen.     ASA: 3  MAL: 2    Plan:  -Plan for cerebral angiogram with possible intervention  -Sedation Plan: General anesthesia  -All diagnostics and imaging reviewed  -Patient NPO since MN  -Risks & benefits of procedure explained in detail; patient consented and all questions answered  -Further reccs to follow procedure          Parmjit Hill MD, MHA  Fellow, NeuroEndovascular Surgery, [facility] [place] [facility]  Neurologist, [facility], " [place]

## 2024-01-11 NOTE — ASSESSMENT & PLAN NOTE
-Per paper chart was previously on Trintellix  -Unclear if patient currently taking or not  -Continue home meds vs start medication as appropriate

## 2024-01-11 NOTE — SEDATION DOCUMENTATION
Pt arrived to IR Dept  for Cerebral angio possible intervention . Pt oriented to unit and staff. Plan of care reviewed with patient, patient verbalizes understanding. Comfort measures utilized. Pt safely transferred from stretcher to procedural table. Fall risk reviewed with patient, fall risk interventions maintained. Safety strap applied, positioner pillows utilized to minimize pressure points. Blankets applied. Pt prepped and draped utilizing standard sterile technique. Patient placed on continuous monitoring, as required by sedation policy. Timeouts completed utilizing standard universal time-out, per department and facility policy. Pt is under the direct care of the anesthesia team at this time. RN to remain at bedside, continuous monitoring maintained. Pt resting comfortably. Denies pain/discomfort. Will continue to monitor. See flow sheets for monitoring, medication administration, and updates.

## 2024-01-11 NOTE — HPI
57 yo F with PMH of hypothyroidism who presents as transfer from [facility] due to thunderclap headache that started suddenly on Friday, 6 days ago. She reports her headache has worsened more and more over the last week so she came to the hospital. She denies any illicit drug use or blood thinners. She reports her headache is a 10 out of 10. 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 Per report there was xanthochromia on LP performed at OSH on 11/10/24. NSGY consulted for aneurysm.

## 2024-01-11 NOTE — PLAN OF CARE
[facility] - Neuro Critical Care  Initial Discharge Assessment       Primary Care Provider: Rosa Sterling    Admission Diagnosis: Cerebral aneurysm [I67.1]    Admission Date: 1/10/2024  Expected Discharge Date:     Transition of Care Barriers: Other (see comments) (unknown)    Payor: [employer] / Plan: [employer] EPO / Product Type: PPO /     No emergency contact information on file.    Discharge Plan A: Home with family  Discharge Plan B: Rehab    No Pharmacies Listed    Initial Assessment (most recent)       Adult Discharge Assessment - 01/11/24 1605          Discharge Assessment    Assessment Type Discharge Planning Assessment     Confirmed/corrected address, phone number and insurance No     Reason No family to provide information/patient unable to answer     Source of Information health record;unable to respond     Does patient/caregiver understand observation status Yes     Communicated CLEO with patient/caregiver Date not available/Unable to determine     Reason For Admission Anterior communicating artery aneurysm     People in Home spouse     Facility Arrived From: [facility]     Do you expect to return to your current living situation? Yes     Do you have help at home or someone to help you manage your care at home? --   Unknown    Prior to hospitilization cognitive status: Unable to Assess     Current cognitive status: Unable to Assess     Walking or Climbing Stairs Difficulty --   unknown    Dressing/Bathing Difficulty --   unknown    Readmission within 30 days? No     Patient currently being followed by outpatient case management? No     Discharge Plan A Home with family     Discharge Plan B Rehab     DME Needed Upon Discharge  other (see comments)   TBD    Transition of Care Barriers Other (see comments)   unknown       Physical Activity    On average, how many days per week do you engage in moderate to strenuous exercise (like a brisk walk)? Patient unable to answer     On average, how many  minutes do you engage in exercise at this level? Patient unable to answer        Financial Resource Strain    How hard is it for you to pay for the very basics like food, housing, medical care, and heating? Patient unable to answer        Housing Stability    In the last 12 months, was there a time when you were not able to pay the mortgage or rent on time? Patient unable to answer     In the last 12 months, was there a time when you did not have a steady place to sleep or slept in a shelter (including now)? Patient unable to answer        Transportation Needs    In the past 12 months, has lack of transportation kept you from medical appointments or from getting medications? Patient unable to answer     In the past 12 months, has lack of transportation kept you from meetings, work, or from getting things needed for daily living? Patient unable to answer        Food Insecurity    Within the past 12 months, you worried that your food would run out before you got the money to buy more. Patient unable to answer     Within the past 12 months, the food you bought just didn't last and you didn't have money to get more. Patient unable to answer        Stress    Do you feel stress - tense, restless, nervous, or anxious, or unable to sleep at night because your mind is troubled all the time - these days? Patient unable to answer        Social Connections    In a typical week, how many times do you talk on the phone with family, friends, or neighbors? Patient unable to answer     How often do you get together with friends or relatives? Patient unable to answer     How often do you attend Advent or Oriental orthodox services? Patient unable to answer     Do you belong to any clubs or organizations such as Advent groups, unions, fraternal or athletic groups, or school groups? Patient unable to answer     How often do you attend meetings of the clubs or organizations you belong to? Patient unable to answer     Are you , ,  , , never , or living with a partner? Patient unable to answer        Alcohol Use    Q1: How often do you have a drink containing alcohol? Patient unable to answer     Q2: How many drinks containing alcohol do you have on a typical day when you are drinking? Patient unable to answer     Q3: How often do you have six or more drinks on one occasion? Patient unable to answer                   SW attempted to conduct Initial DPA several time today.  Pt was either being seen by providers in the room or out of the room.  No contact information for family is in chart at this time.  SW will continue to follow to conduct a more through DPA.    No other discharge needs identified at this time.    Discharge Plan A and Plan B have been determined by review of patient's clinical status, future medical and therapeutic needs, and coverage/benefits for post-acute care in coordination with multidisciplinary team members.     Smitha Manning LMSW  Case Management [facility] [place]  [id number]

## 2024-01-11 NOTE — PT/OT/SLP PROGRESS
Occupational Therapy      Patient Name:  Loreto Goff   MRN:  [id number]    Patient not seen today secondary to being on hold; patient with 10/10 pain; morphine just administered by RN. . Will follow-up 1/12.    1/11/2024

## 2024-01-11 NOTE — PLAN OF CARE
"[facility] Care Plan    POC reviewed with Loreto Goff and family at 1400. Pt's  and son verbalized understanding. Questions and concerns addressed. No acute events today. Pt progressing toward goals. Will continue to monitor. See below and flowsheets for full assessment and VS info.     -DSA - s/p coiling  -CT Head / CTA head/neck completed  -Echo completed  -straight cath x2, UA sent  -BMx1  -K replaced  -PRN oxy 5 / tylenol administered for pain  -Migraine cocktail administered for pain  -One time dose of dexamethasone 8mg given for pain  -PRN Dilaudid given for pain  -PRN Hydralazine and Labetalol given to keep SBP <140 (before SBP parameters liberalized to 160)  -Cardene gtt titrated to keep SBP <160  -NS @ 100cc/hr    Is this a stroke patient? no    Neuro:  Fili Coma Scale  Best Eye Response: 4-->(E4) spontaneous  Best Motor Response: 6-->(M6) obeys commands  Best Verbal Response: 5-->(V5) oriented  Fili Coma Scale Score: 15  Assessment Qualifiers: patient not sedated/intubated  Pupil PERRLA: yes     24 hr Temp:  [97.9 °F (36.6 °C)-98.7 °F (37.1 °C)]     CV:   Rhythm: normal sinus rhythm  BP goals:   SBP < 160  MAP > 65    Resp:           Plan: N/A    GI/:     Diet/Nutrition Received: NPO  Last Bowel Movement: 01/09/24  Voiding Characteristics: voids spontaneously without difficulty    Intake/Output Summary (Last 24 hours) at 1/11/2024 1846  Last data filed at 1/11/2024 1842  Gross per 24 hour   Intake 2443.46 ml   Output 2450 ml   Net -6.54 ml     Unmeasured Output  Stool Occurrence: 0    Labs/Accuchecks:  Recent Labs   Lab 01/11/24  0552   WBC 15.83*   RBC 4.87   HGB 13.9   HCT 43.3         Recent Labs   Lab 01/11/24  0552      K 3.2*   CO2 18*      BUN 12   CREATININE 0.6   ALKPHOS 104   ALT 80*   AST 63*   BILITOT 0.5      Recent Labs   Lab 01/11/24  0552   INR 1.0   APTT 24.0    No results for input(s): "CPK", "CPKMB", "TROPONINI", "MB" in the last 168 hours.    Electrolytes: " Electrolytes replaced  Accuchecks: Q6H    Gtts:   sodium chloride 0.9% 100 mL/hr at 01/11/24 1801    nicardipine         LDA/Wounds:  Lines/Drains/Airways       Arterial Line  Duration             Arterial Line 01/11/24 1203 Left Radial <1 day              Peripheral Intravenous Line  Duration                  Peripheral IV - Single Lumen 01/10/24 20 G Anterior;Left Forearm 1 day         Peripheral IV - Single Lumen 01/10/24 20 G Anterior;Right Hand 1 day                  Wounds: No  Wound care consulted: No

## 2024-01-11 NOTE — PROCEDURES
Interventional Neuroradiology Post-Procedure Note    Pre Op Diagnosis: A-comm aneurysm    Post Op Diagnosis: Same    Procedure: Diagnostic cerebral angiogram    Procedure performed by: Alisia FAIRCHILD, Lebron;  Sergio FAIRCHILD, Parmjit; Eliezer FAIRCHILD, Irwin SUTTON     Written Informed Consent Obtained: Yes    Specimen Removed: NO    Estimated Blood Loss: Minimal    Procedure report:     A 5F sheath was placed into the right femoral artery and a 5F Ruddy catheter was advanced into the aortic arch.  The Right CCA/ICA arteries were subselected and angiography of the brain was performed after injection into each of these vessels.    Preliminary interpretation: Successful coiling of A-comm aneurysm using 4 coils.  Please see Imaging report for full details.    A right femoral artery angiogram was performed, the sheath removed and hemostasis achieved using     Vascade closure device .  No hematoma was present at the time of hemostasis.    The patient tolerated the procedure well.     Plan:  -To NCC for monitoring  -Bed rest for 2h  -Groin check and pulse check q2h   -Remainder of care, per vascular neurology and NCC  -Avoid carrying heavy weights > 10 lbs x 24 hrs   -Remove groin dressing tomorrow   -Resume diet as prior   -Resume home medications           Irwin Martins MD  Fellow, NeuroEndovascular Surgery, [facility] [place] [facility]  Board certified Vascular Neurologist  [place], [place]

## 2024-01-11 NOTE — ANESTHESIA PREPROCEDURE EVALUATION
[facility]  Anesthesia Pre-Operative Evaluation         Patient Name/: Loreto Goff, 1965  MRN: [id number]    SUBJECTIVE:     Pre-operative evaluation for * No procedures listed *     2024    Loreto Goff is a 58 y.o. female w/ a significant PMHx of HTN, asthma, and hypothyroidism who presents due to large A comm aneurysm w SAH.    Patient now presents for the above procedure(s).    ________________________________________  Echo: No results found for this or any previous visit.    ________________________________________    Prev airway: None documented.    LDA:        Peripheral IV - Single Lumen 01/10/24 20 G Anterior;Left Forearm (Active)   Site Assessment Clean;Dry;Intact;No redness;No swelling 24   Extremity Assessment Distal to IV No abnormal discoloration;No redness;No swelling;No warmth 24   Line Status No blood return;Flushed;Saline locked 24   Dressing Status Clean;Dry;Intact 24   Dressing Intervention Integrity maintained 24   Dressing Change Due 24   Site Change Due 24   Reason Not Rotated Not due 24   Number of days: 1            Peripheral IV - Single Lumen 01/10/24 20 G Right Antecubital (Active)   Site Assessment Clean;Dry;Intact;No redness;No swelling 24   Extremity Assessment Distal to IV No abnormal discoloration;No redness;No swelling;No warmth 24   Line Status Flushed;Saline locked 24   Dressing Status Clean;Dry;Intact 24   Dressing Intervention Integrity maintained 24   Dressing Change Due 24   Site Change Due 24 07   Reason Not Rotated Not due 01/11/24 0901   Number of days: 1            Peripheral IV - Single Lumen 01/10/24 20 G Anterior;Right Hand (Active)   Site Assessment Clean;Dry;Intact;No redness;No swelling 24   Extremity Assessment Distal to IV No  abnormal discoloration;No redness;No swelling;No warmth 01/11/24 0901   Line Status No blood return;Infusing 01/11/24 0901   Dressing Status Clean;Dry;Intact 01/11/24 0901   Dressing Intervention Integrity maintained 01/11/24 0901   Dressing Change Due 01/14/24 01/11/24 0901   Site Change Due 01/14/24 01/11/24 0701   Reason Not Rotated Not due 01/11/24 0901   Number of days: 1       Drips:    sodium chloride 0.9% 100 mL/hr at 01/11/24 1019    nicardipine 10 mg/hr (01/11/24 1001)       Patient Active Problem List   Diagnosis    Anterior communicating artery aneurysm    Thunderclap headache    Essential hypertension    Acquired hypothyroidism    Mild intermittent asthma without complication    Recurrent major depression in partial remission       Review of patient's allergies indicates:  No Known Allergies    Current Inpatient Medications:    dexAMETHasone  8 mg Intravenous Once    gabapentin  400 mg Oral Q8H    levETIRAcetam  500 mg Oral BID    levothyroxine  25 mcg Oral Before breakfast    magnesium sulfate IVPB  2 g Intravenous Once    mupirocin   Nasal BID    niMODipine  60 mg Oral Q4H    senna-docusate 8.6-50 mg  1 tablet Oral Daily       No current facility-administered medications on file prior to encounter.     No current outpatient medications on file prior to encounter.       No past surgical history on file.    Social History:  Tobacco Use: Not on file       Alcohol Use: Not on file       OBJECTIVE:     Vital Signs Range:  BMI Readings from Last 1 Encounters:   01/11/24 32.66 kg/m²       Temp:  [36.9 °C (98.4 °F)-37.1 °C (98.7 °F)]   Pulse:  [72-86]   Resp:  [12-36]   BP: (116-190)/(56-77)   SpO2:  [90 %-96 %]        Significant Labs:        Component Value Date/Time    WBC 15.83 (H) 01/11/2024 0552    HGB 13.9 01/11/2024 0552    HCT 43.3 01/11/2024 0552     01/11/2024 0552     01/11/2024 0552    K 3.2 (L) 01/11/2024 0552     01/11/2024 0552    CO2 18 (L) 01/11/2024 0552     (H)  01/11/2024 0552    BUN 12 01/11/2024 0552    CREATININE 0.6 01/11/2024 0552    MG 2.4 01/11/2024 0552    PHOS 3.0 01/11/2024 0552    PHOS 3.0 01/11/2024 0552    CALCIUM 8.6 (L) 01/11/2024 0552    ALBUMIN 3.4 (L) 01/11/2024 0552    PROT 6.9 01/11/2024 0552    ALKPHOS 104 01/11/2024 0552    BILITOT 0.5 01/11/2024 0552    AST 63 (H) 01/11/2024 0552    ALT 80 (H) 01/11/2024 0552    INR 1.0 01/11/2024 0552    HGBA1C 5.8 (H) 01/10/2024 2247        Please see Results Review for additional labs.     Diagnostic Studies: No relevant studies.    EKG:   No results found for this or any previous visit.    ECHO:  See subjective, if available.      ASSESSMENT/PLAN:                                                                                                                  01/11/2024  Loreto Goff is a 58 y.o., female.      Pre-op Assessment    I have reviewed the Patient Summary Reports.     I have reviewed the Nursing Notes. I have reviewed the NPO Status.   I have reviewed the Medications.     Review of Systems  Anesthesia Hx:  No problems with previous Anesthesia   History of prior surgery of interest to airway management or planning:          Denies Family Hx of Anesthesia complications.    Denies Personal Hx of Anesthesia complications.                    Hematology/Oncology:    Oncology Normal                                   Cardiovascular:     Hypertension       Denies Angina.        ECG has been reviewed.                          Pulmonary:    Asthma   Denies Shortness of breath.  Denies Recent URI.                 Renal/:  Renal/ Normal                 Hepatic/GI:      Denies GERD. Denies Liver Disease.            Neurological:    Denies CVA.   Headaches Denies Seizures.    A comm aneurysm w SAH                            Endocrine:  Denies Diabetes. Hypothyroidism          Psych:    depression                Physical Exam  General: Well nourished, Cooperative, Alert and Oriented    Airway:  Mallampati: II    Mouth Opening: Normal  TM Distance: Normal  Tongue: Normal  Neck ROM: Normal ROM    Dental:  Intact        Anesthesia Plan  Type of Anesthesia, risks & benefits discussed:    Anesthesia Type: Gen ETT  Intra-op Monitoring Plan: Standard ASA Monitors and Art Line  Post Op Pain Control Plan: multimodal analgesia and IV/PO Opioids PRN  Induction:  IV  Airway Plan: Direct, Post-Induction  Informed Consent: Informed consent signed with the Patient and all parties understand the risks and agree with anesthesia plan.  All questions answered.   ASA Score: 4  Day of Surgery Review of History & Physical: H&P Update referred to the surgeon/provider.    Ready For Surgery From Anesthesia Perspective.     .

## 2024-01-11 NOTE — ASSESSMENT & PLAN NOTE
Previously on Trintellix per paper chart  Confirm if patient is still taking and resume as appropriate

## 2024-01-11 NOTE — HPI
Ms. Loreto Goff is a 59 y/o F with a PMHx of depression and hypothyroidism who presents to [facility] with thunderclap HA. She initially presented to [facility] on 01/10, but states that her symtoms consisting of neck pain radiating the front of her head, photophobia, and R leg numbness started on 01/05.  No acute intracranial abnormality on CT reported from [facility], though questionable area of hyperdensity upon review of disc at [facility].  A Comm aneurysm on CTA. LP performed at [facility] with reported xanthochromia. Further LP results from [facility] as follows CSF color, red; ROSEMARY CSF, bloody; CSF volume, 14; WBC, 31; RBC 51,000; Protein 64.8; Glucose 73; Gram stain without organisms seen. She will be admitted to [facility] for hourly neuro-monitoring and a higher level of care.

## 2024-01-11 NOTE — ASSESSMENT & PLAN NOTE
Loreto Goff is a 58 y.o. female with PMH of depression, hypothyroidism transferred from [facility] for higher level care of suspected SAH and acomm aneurysm. Initially presented to OSH with sudden onset thunderclap HA with associated neck pain, photophobia, and RLE numbness. Symptoms began 1/5/24 and HA progressively worsened since, prompting her to present to OSH ED 1/10/24. Workup at OSH significant for: CTA with large acomm aneurysm; LP with CSF with reported xanthochromia, WBC 31, RBC 51,000, Protein 64.8, Glucose 73. She is admitted to [facility] for further evaluation and close monitoring, NSGY consulted and following for SAH protocol.    On arrival to NCC, patient continues to complain of HA with photophobia and RLE pain. Upon further review of OSH imaging with NSGY, concern for SAH in area near acomm aneurysm. Neuro IR consulted for DSA.      -Hold AC/AP due to concern for SAH  -Statin not indicated for SAH  -MRI/MRA W WO with vessel wall imaging pending  -DSA for further eval/treatment of aneurysm TBD pending neuro IR  -SAH protocol: Keppra 500mg BID, nimotop 60q4h, daily TCDs  -PT/OT/SLP eval and treat  -SBP goal <140, maintain MAP >65  -VTE PPx: SCDs, no pharmacologic ppx due to concern for SAH

## 2024-01-11 NOTE — SUBJECTIVE & OBJECTIVE
No medications prior to admission.       Review of patient's allergies indicates:  Not on File    No past medical history on file.  No past surgical history on file.  Family History    None       Tobacco Use    Smoking status: Not on file    Smokeless tobacco: Not on file   Substance and Sexual Activity    Alcohol use: Not on file    Drug use: Not on file    Sexual activity: Not on file     Review of Systems   Neurological:  Positive for headaches.   All other systems reviewed and are negative.    Objective:        There is no height or weight on file to calculate BMI.  Vital Signs (Most Recent):  Resp: (!) 22 (01/11/24 0002)  BP: (!) 170/76 (01/10/24 2219) Vital Signs (24h Range):  Temp:  [97.6 °F (36.4 °C)] 97.6 °F (36.4 °C)  Pulse:  [94] 94  Resp:  [20-25] 22  SpO2:  [99 %] 99 %  BP: (153-170)/(64-76) 170/76                                 Physical Exam  Constitutional:       Appearance: She is well-developed and well-nourished.   Eyes:      Extraocular Movements: EOM normal.      Conjunctiva/sclera: Conjunctivae normal.      Pupils: Pupils are equal, round, and reactive to light.   Cardiovascular:      Pulses: Normal pulses.   Abdominal:      Palpations: Abdomen is soft.   Neurological:      Mental Status: She is alert and oriented to person, place, and time.      Comments: E3V5M6  AAOx3  PERRL  Cranial nerves intact  Vision grossly normal  Patient follows commands all extremities; slightly uncooperative   At least 4/5 strength in all extremities; non focal on exam\  SILT  DTRs normal              Physical Exam:    Constitutional: She appears well-developed and well-nourished.     Eyes: Pupils are equal, round, and reactive to light. Conjunctivae and EOM are normal.     Cardiovascular: Normal pulses.     Abdominal: Soft.     Psych/Behavior: She is alert. She is oriented to person, place, and time.     Neurological:   E3V5M6  AAOx3  PERRL  Cranial nerves intact  Vision grossly normal  Patient follows commands all  "extremities; slightly uncooperative   At least 4/5 strength in all extremities; non focal on exam\  SILT  DTRs normal       Significant Labs:  Recent Labs   Lab 01/10/24  2247   *      K 3.4*   *   CO2 16*   BUN 13   CREATININE 0.6   CALCIUM 8.6*   MG 2.1     Recent Labs   Lab 01/10/24  2247   WBC 17.79*   HGB 14.7   HCT 43.7        No results for input(s): "LABPT", "INR", "APTT" in the last 48 hours.  Microbiology Results (last 7 days)       ** No results found for the last 168 hours. **          All pertinent labs from the last 24 hours have been reviewed.    Significant Diagnostics:  I have reviewed all pertinent imaging results/findings within the past 24 hours.  "

## 2024-01-11 NOTE — HPI
Loreto Goff is a 58 y.o. female with PMH of depression, hypothyroidism that presents as a transfer from [facility] for higher level care of suspected SAH. She initially presented to OSH with thunderclap HA with associated neck pain, photophobia, and RLE numbness that began 1/5/24. HA progressively worsened since onset prompting her to present to OSH ED 1/10. Denies illicit drug use of blood thinners. CTA at OSH with large Acomm aneurysm but reportedly no SAH. LP performed at OSH with reported xanthochromia, additional CSF results include: CSF color, red; ROSEMARY CSF, bloody; CSF volume, 14; WBC, 31; RBC 51,000; Protein 64.8; Glucose 73. On arrival to NCC, patient continues to complain of HA with photophobia and RLE pain. Upon further review of OSH imaging with NSGY, concern for SAH in area near acomm aneurysm. She is admitted to NCC for further evaluation and close monitoring, NSGY following for SAH protocol.

## 2024-01-11 NOTE — NURSING
Patient arrived to [facility] from [facility]    Report received from: OSH    Type of stroke/diagnosis: Aneurysm    Current symptoms: Headache, moaning on arrival, oriented to place, self, situation, blurry vision, moving all extremities, denies numbness or tingling, PERRL     Skin Assessment done: Yes  Wounds noted: None   *If wounds noted, was Wound Care consulted? N/A  *If wounds noted, LDA placed? N/A  Skin Assessment Verified by:  JOHANA Farley RN Completed? Pending     Patient Belongings on Admit: 2 gold rings, yellowish sweater, hairbrush, glasses, phone,     NCC notified: VINICIO Linares

## 2024-01-11 NOTE — SUBJECTIVE & OBJECTIVE
No past medical history on file.  No past surgical history on file.   No current facility-administered medications on file prior to encounter.     No current outpatient medications on file prior to encounter.      Allergies: Patient has no allergy information on record.  No family history on file.     Review of Systems   Eyes:  Positive for photophobia and visual disturbance.   Gastrointestinal:  Negative for nausea and vomiting.   Musculoskeletal:  Positive for back pain and neck pain.   Neurological:  Positive for weakness, numbness and headaches.     Objective:     Vitals:    Pulse: 76  Rhythm: sinus tachycardia  BP: (!) 171/76  MAP (mmHg): 109  Resp: (!) 22  SpO2: (!) 92 %    Temp  Min: 97.6 °F (36.4 °C)  Max: 97.6 °F (36.4 °C)  Pulse  Min: 76  Max: 94  BP  Min: 153/64  Max: 190/77  MAP (mmHg)  Min: 109  Max: 111  Resp  Min: 20  Max: 36  SpO2  Min: 92 %  Max: 99 %    No intake/output data recorded.            Physical Exam  Constitutional:       General: She is not in acute distress.  HENT:      Head: Normocephalic and atraumatic.   Cardiovascular:      Rate and Rhythm: Normal rate and regular rhythm.   Pulmonary:      Effort: Pulmonary effort is normal. No respiratory distress.   Abdominal:      Palpations: Abdomen is soft.   Musculoskeletal:      Comments: TTP overlying R hip preventing patient from lying flat   Neurological:      Comments: Sedation: None  E3V5M6  AAOx3  Follows all commands  Pupils brisk  PERRL  Patient refusing to participate in EOM 2/2 pain  BARBY and AG  SILT     Patient refusing to lay on back 2/2 pain             Today I personally reviewed pertinent medications, lines/drains/airways, imaging, cardiology results, laboratory results, microbiology results,

## 2024-01-11 NOTE — ASSESSMENT & PLAN NOTE
59 y/o presents to [facility] with thunderclap HA. Per OHS, reportedly no acute intracranial abnormality on CT head, though upon review at bedside, area of questionable hyperdensity with A Comm aneurysm on CTA. LP performed at [facility] with reported xanthochromia, though not documented on paper chart. Further LP results from [facility] as follows CSF color, red; ROSEMARY CSF, bloody; CSF volume, 14; WBC, 31; RBC 51,000; Protein 64.8; Glucose 73; Gram stain without organisms seen.    -Admit to [facility]  -NSGY, VN consulted  -q1h neuro checks, vital checks  -EKG, ECHO, CXR  -A1c, TSH, lipid panel, coag panel  -Daily CBC, CMP, mag, phos  -SBP < 140, prn labetalol and hydralazine  -SCDs, hold DVT chemoppx in acute period   -Keppra 500 bid  -Nimotop 60q4h  -PT/OT  -MRI/MRA w/wo with vessel wall imaging pending  -IR consutled for DSA

## 2024-01-11 NOTE — PT/OT/SLP PROGRESS
Speech Language Pathology      Loreto Goff  MRN: [id number]    Patient not seen today secondary to  (NPO for angiogram, then NASIR for angiogram into the late afternoon). Will re-attempt 1/12.

## 2024-01-11 NOTE — H&P
[facility] - Neuro Critical Care  Neurocritical Care  History & Physical    Admit Date: 1/10/2024  Service Date: 01/11/2024  Length of Stay: 1    Subjective:     Chief Complaint: Anterior communicating artery aneurysm    History of Present Illness: Ms. Loreto Goff is a 57 y/o F with a PMHx of depression and hypothyroidism who presents to [facility] with thunderclap HA. She initially presented to [facility] on 01/10, but states that her symtoms consisting of neck pain radiating the front of her head, photophobia, and R leg numbness started on 01/05.  No acute intracranial abnormality on CT reported from [facility], though questionable area of hyperdensity upon review of disc at [facility].  A Comm aneurysm on CTA. LP performed at [facility] with reported xanthochromia. Further LP results from [facility] as follows CSF color, red; ROSEMARY CSF, bloody; CSF volume, 14; WBC, 31; RBC 51,000; Protein 64.8; Glucose 73; Gram stain without organisms seen. She will be admitted to [facility] for hourly neuro-monitoring and a higher level of care.       No past medical history on file.  No past surgical history on file.   No current facility-administered medications on file prior to encounter.     No current outpatient medications on file prior to encounter.      Allergies: Patient has no allergy information on record.  No family history on file.     Review of Systems   Eyes:  Positive for photophobia and visual disturbance.   Gastrointestinal:  Negative for nausea and vomiting.   Musculoskeletal:  Positive for back pain and neck pain.   Neurological:  Positive for weakness, numbness and headaches.     Objective:     Vitals:    Pulse: 76  Rhythm: sinus tachycardia  BP: (!) 171/76  MAP (mmHg): 109  Resp: (!) 22  SpO2: (!) 92 %    Temp  Min: 97.6 °F (36.4 °C)  Max: 97.6 °F (36.4 °C)  Pulse  Min: 76  Max: 94  BP  Min: 153/64  Max: 190/77  MAP (mmHg)  Min: 109  Max: 111  Resp  Min: 20  Max: 36  SpO2  Min: 92 %  Max: 99 %    No intake/output data recorded.            Physical  Exam  Constitutional:       General: She is in acute distress.   HENT:      Head: Normocephalic and atraumatic.   Cardiovascular:      Rate and Rhythm: Normal rate and regular rhythm.   Pulmonary:      Effort: Pulmonary effort is normal. No respiratory distress.   Abdominal:      Palpations: Abdomen is soft.   Musculoskeletal:      Comments: TTP overlying R hip preventing patient from lying flat   Neurological:      Comments: Sedation: None  E3V5M6  AAOx3  Follows all commands  Pupils brisk  PERRL  Patient refusing to participate in EOM 2/2 pain  BARBY and AG  SILT     Patient refusing to lay on back 2/2 pain             Today I personally reviewed pertinent medications, lines/drains/airways, imaging, cardiology results, laboratory results, microbiology results, notably: CT/CTA        Assessment/Plan:     Neuro  * Anterior communicating artery aneurysm  57 y/o presents to [facility] with thunderclap HA. Per OHS, reportedly no acute intracranial abnormality on CT head, though upon review at bedside, area of questionable hyperdensity with A Comm aneurysm on CTA. LP performed at [facility] with reported xanthochromia, though not documented on paper chart. Further LP results from [facility] as follows CSF color, red; ROSEMARY CSF, bloody; CSF volume, 14; WBC, 31; RBC 51,000; Protein 64.8; Glucose 73; Gram stain without organisms seen.    -Admit to [facility]  -NSGY, VN consulted  -q1h neuro checks, vital checks  -EKG, ECHO, CXR  -A1c, TSH, lipid panel, coag panel  -Daily CBC, CMP, mag, phos  -SBP < 140, prn labetalol and hydralazine  -SCDs, hold DVT chemoppx in acute period   -Keppra 500 bid  -Nimotop 60q4h  -PT/OT  -MRI/MRA w/wo with vessel wall imaging pending  -IR consutled for DSA    Thunderclap headache  Pain control as needed  See primary problem     Psychiatric  Recurrent major depression in partial remission  Previously on Trintellix per paper chart  Confirm if patient is still taking and resume as appropriate    Pulmonary  Mild intermittent  asthma without complication  PRN duonebs    Cardiac/Vascular  Essential hypertension  SBP < 140   PRN labetalol, hydralazine      Endocrine  Acquired hypothyroidism  Resume home synthroid   Known history or thyroid cyst/goiter          The patient is being Prophylaxed for:  Venous Thromboembolism with: Mechanical or Chemical  Stress Ulcer with: None  Ventilator Pneumonia with: none    Activity Orders            Turn patient starting at 01/10 2200    Elevate HOB starting at 01/10 2156    Diet NPO: NPO starting at 01/10 2156          Full Code    CCT 60 min    YASMANY SainiC  Neurocritical Care  [facility] - Neuro Critical Care

## 2024-01-11 NOTE — CONSULTS
Inpatient consult to Physical Medicine Rehab  Consult performed by: Lizzeth Ricci NP  Consult ordered by: Abbi Linares, TOSHIA  Reason for consult: Assess rehab needs      Reviewed patient history and current admission.  Rehab team following.  Full consult to follow.    RAYA Arevalo, FNP-C  Physical Medicine & Rehabilitation   01/11/2024

## 2024-01-11 NOTE — ASSESSMENT & PLAN NOTE
59 yo F with PMH of hypothyroidism who presents as transfer from [facility] due to thunderclap headache that started suddenly on Friday, 6 days ago. She reports her headache has worsened more and more over the last week so she came to the hospital. She denies any illicit drug use or blood thinners. She reports her headache is a 10 out of 10.     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   Per report there was xanthochromia on LP performed at OSH on 11/10/24.     Presumably post bleed day 6    - admitted to [facility], q1h neuro checks  - recommend IR consult for angiogram with plans to treat aneursym  - SAH protocol // vasospasm watch   - keppra for seizure ppx   - Na > 135   - nimotop x21 days   - TCDs x14 days   - SBP < 140  - on room air, ctm  - strict I/O's; keep euvolemic to 1L net positive  - NPO, maintenance fluid  - hold any blood thinners  - rest of care per NCC; nsgy will continue to follow

## 2024-01-11 NOTE — TRANSFER OF CARE
"Anesthesia Transfer of Care Note    Patient: Loreto Goff    Procedure(s) Performed: * No procedures listed *    Patient location: ICU    Anesthesia Type: general    Transport from OR: Transported from OR on 2-3 L/min O2 by NC with adequate spontaneous ventilation    Post pain: adequate analgesia    Post assessment: no apparent anesthetic complications    Post vital signs: stable    Level of consciousness: awake, alert and confused    Nausea/Vomiting: no nausea/vomiting    Complications: none    Transfer of care protocol was followed      Last vitals: Visit Vitals  BP (!) 142/63 (BP Location: Right arm, Patient Position: Lying)   Pulse 81   Temp 36.7 °C (98 °F) (Oral)   Resp (!) 21   Ht 5' 7" (1.702 m)   Wt 97.1 kg (214 lb)   SpO2 95%   BMI 33.52 kg/m²     "

## 2024-01-11 NOTE — ANESTHESIA PROCEDURE NOTES
Intubation    Date/Time: 1/11/2024 12:02 PM    Performed by: Nicholas Macdonald CRNA  Authorized by: Raji Mattson Jr., MD    Intubation:     Induction:  Intravenous    Intubated:  Postinduction    Mask Ventilation:  Easy mask    Attempts:  1    Attempted By:  CRNA    Method of Intubation:  Video laryngoscopy    Blade:  Orr 3    Laryngeal View Grade: Grade I - full view of cords      Difficult Airway Encountered?: No      Complications:  None    Airway Device:  Oral endotracheal tube    Airway Device Size:  7.5    Style/Cuff Inflation:  Cuffed (inflated to minimal occlusive pressure)    Tube secured:  21    Secured at:  The teeth    Placement Verified By:  Capnometry    Complicating Factors:  None    Findings Post-Intubation:  BS equal bilateral and atraumatic/condition of teeth unchanged

## 2024-01-11 NOTE — CONSULTS
[facility] - Neuro Critical Care  Vascular Neurology  Comprehensive Stroke Center  Consult Note    Inpatient consult to Vascular (Stroke) Neurology  Consult performed by: Hazel Song PA-C  Consult ordered by: Abbi Linares PA-C  Reason for consult: Acomm aneurysm with suspected SAH        Assessment/Plan:     Patient is a 58 y.o. year old female with:    * Anterior communicating artery aneurysm  Loreto Goff is a 58 y.o. female with PMH of depression, hypothyroidism transferred from [facility] for higher level care of suspected SAH and acomm aneurysm. Initially presented to OSH with sudden onset thunderclap HA with associated neck pain, photophobia, and RLE numbness. Symptoms began 1/5/24 and HA progressively worsened since, prompting her to present to OSH ED 1/10/24. Workup at OSH significant for: CTA with large acomm aneurysm; LP with CSF with reported xanthochromia, WBC 31, RBC 51,000, Protein 64.8, Glucose 73. She is admitted to [facility] for further evaluation and close monitoring, NSGY consulted and following for SAH protocol.    On arrival to NCC, patient continues to complain of HA with photophobia and RLE pain. Upon further review of OSH imaging with NSGY, concern for SAH in area near acomm aneurysm. Neuro IR consulted for DSA.      -Hold AC/AP due to concern for SAH  -Statin not indicated for SAH  -MRI/MRA W WO with vessel wall imaging pending  -DSA for further eval/treatment of aneurysm TBD pending neuro IR  -SAH protocol: Keppra 500mg BID, nimotop 60q4h, daily TCDs  -PT/OT/SLP eval and treat  -SBP goal <140, maintain MAP >65  -VTE PPx: SCDs, no pharmacologic ppx due to concern for SAH    Recurrent major depression in partial remission  -Per paper chart was previously on Trintellix  -Unclear if patient currently taking or not  -Continue home meds vs start medication as appropriate    Mild intermittent asthma without complication  -PRN duonebs    Acquired hypothyroidism  -TSH <0.010 on admit  -Continue  home synthroid  -Consider endocrinology consult for management/medication adjustment recs    Essential hypertension  -SBP <140 for unsecure aneurysm, maintain MAP >65  -PRN labetalol/hydralazine    Thunderclap headache  -Likely due to suspected SAH  -HA management/pain control per NCC  -See Anterior communicating artery aneurysm for additional details        STROKE DOCUMENTATION          NIH Scale:  1a. Level of Consciousness: 0-->Alert, keenly responsive  1b. LOC Questions: 0-->Answers both questions correctly  1c. LOC Commands: 0-->Performs both tasks correctly  2. Best Gaze: 0-->Normal  3. Visual: 0-->No visual loss  4. Facial Palsy: 0-->Normal symmetrical movements  5a. Motor Arm, Left: 0-->No drift, limb holds 90 (or 45) degrees for full 10 secs  5b. Motor Arm, Right: 0-->No drift, limb holds 90 (or 45) degrees for full 10 secs  6a. Motor Leg, Left: 0-->No drift, leg holds 30 degree position for full 5 secs  6b. Motor Leg, Right: 0-->No drift, leg holds 30 degree position for full 5 secs  7. Limb Ataxia: 0-->Absent  8. Sensory: 0-->Normal, no sensory loss  9. Best Language: 0-->No aphasia, normal  10. Dysarthria: 0-->Normal  11. Extinction and Inattention (formerly Neglect): 0-->No abnormality  Total (NIH Stroke Scale): 0    Modified Susana Score: 0  Fili Coma Scale:    ABCD2 Score:    [id number] Score:   HAS -BLED Score:   ICH Score:   Hunt & Arevalo Classification:Grade II      Thrombolysis Candidate? No, Suspicion of subarachnoid hemorrhage     Delays to Thrombolysis?  Not Applicable    Interventional Revascularization Candidate?   Is the patient eligible for mechanical endovascular reperfusion (ARTUR)?  No; No large vessel occlusion identified on imaging     Delays to Thrombectomy? Not Applicable    Hemorrhagic change of an Ischemic Stroke: Does this patient have an ischemic stroke with hemorrhagic changes? No     Subjective:     History of Present Illness:  Loreto Goff is a 58 y.o. female with PMH of  depression, hypothyroidism that presents as a transfer from [facility] for higher level care of suspected SAH. She initially presented to OSH with thunderclap HA with associated neck pain, photophobia, and RLE numbness that began 1/5/24. HA progressively worsened since onset prompting her to present to OSH ED 1/10. Denies illicit drug use of blood thinners. CTA at OSH with large Acomm aneurysm but reportedly no SAH. LP performed at OSH with reported xanthochromia, additional CSF results include: CSF color, red; ROSEMARY CSF, bloody; CSF volume, 14; WBC, 31; RBC 51,000; Protein 64.8; Glucose 73. On arrival to NCC, patient continues to complain of HA with photophobia and RLE pain. Upon further review of OSH imaging with NSGY, concern for SAH in area near acomm aneurysm. She is admitted to [facility] for further evaluation and close monitoring, NSGY following for SAH protocol.          No past medical history on file.  No past surgical history on file.     Review of patient's allergies indicates:  Not on File    Medications: I have reviewed the current medication administration record.    No medications prior to admission.       Review of Systems   Constitutional:  Negative for fatigue.   HENT:  Negative for drooling and trouble swallowing.    Eyes:  Positive for photophobia. Negative for visual disturbance.   Respiratory:  Negative for choking.    Cardiovascular:  Negative for palpitations.   Gastrointestinal:  Negative for nausea and vomiting.   Musculoskeletal:  Positive for neck pain.   Skin:  Negative for color change.   Neurological:  Positive for numbness and headaches. Negative for speech difficulty and weakness.   Psychiatric/Behavioral:  Negative for confusion.    All other systems reviewed and are negative.    Objective:     Vital Signs (Most Recent):  Pulse: 76 (01/11/24 0001)  Resp: (!) 22 (01/11/24 0002)  BP: (!) 171/76 (01/11/24 0001)  SpO2: (!) 92 % (01/11/24 0001)    Vital Signs Range (Last 24H):  Temp:  [97.6  "°F (36.4 °C)]   Pulse:  [76-94]   Resp:  [20-36]   BP: (153-190)/(64-77)   SpO2:  [92 %-99 %]        Physical Exam  Vitals and nursing note reviewed.   Constitutional:       General: She is not in acute distress.  HENT:      Head: Normocephalic.      Nose: Nose normal.      Mouth/Throat:      Mouth: Mucous membranes are moist.   Eyes:      Extraocular Movements: Extraocular movements intact.      Conjunctiva/sclera: Conjunctivae normal.   Cardiovascular:      Rate and Rhythm: Normal rate.   Pulmonary:      Effort: Pulmonary effort is normal. No respiratory distress.   Abdominal:      General: There is no distension.   Musculoskeletal:         General: No deformity or signs of injury.      Cervical back: Normal range of motion.   Skin:     General: Skin is warm.   Neurological:      Comments: See below for neuro exam. Neuro exam somewhat limited by patient cooperation          Neurological Exam:   LOC: alert  Attention Span: Good   Language: No aphasia  Articulation: No dysarthria  Orientation: Person, Place, Time   EOM (CN III, IV, VI): Full/intact  Facial Sensation (CN V): Normal  Facial Movement (CN VII): Symmetric facial expression    Motor: LUE Normal 5/5, LLE Normal 5/5, RUE Normal 5/5, RLE Normal 5/5  Sensation: Intact to light touch        Laboratory:  CMP:   Recent Labs   Lab 01/10/24  2247   CALCIUM 8.6*   ALBUMIN 3.6   PROT 7.3      K 3.4*   CO2 16*   *   BUN 13   CREATININE 0.6   ALKPHOS 104   ALT 80*   AST 68*   BILITOT 0.4     CBC:   Recent Labs   Lab 01/10/24  2247   WBC 17.79*   RBC 4.97   HGB 14.7   HCT 43.7      MCV 88   MCH 29.6   MCHC 33.6     Lipid Panel:   Recent Labs   Lab 01/10/24  2247   CHOL 209*   LDLCALC 131.2   HDL 40   TRIG 189*     Coagulation: No results for input(s): "PT", "INR", "APTT" in the last 168 hours.  Hgb A1C:   Recent Labs   Lab 01/10/24  2247   HGBA1C 5.8*     TSH:   Recent Labs   Lab 01/10/24  2247   TSH <0.010*       Diagnostic " Results:      Brain/Vessel Imaging:  CTA head/neck 1/10/24 @ OSH  Images personally reviewed, shows large acomm aneurysm as well as subtle hyperdensity near area of aneurysm c/f SAH          Hazel Song PA-C  [facility]  Department of Vascular Neurology   [facility] - Neuro Critical Care

## 2024-01-11 NOTE — SUBJECTIVE & OBJECTIVE
No past medical history on file.  No past surgical history on file.   No current facility-administered medications on file prior to encounter.     No current outpatient medications on file prior to encounter.      Allergies: Patient has no allergy information on record.  No family history on file.     Review of Systems   Eyes:  Positive for photophobia and visual disturbance.   Gastrointestinal:  Negative for nausea and vomiting.   Musculoskeletal:  Positive for back pain and neck pain.   Neurological:  Positive for weakness, numbness and headaches.     Objective:     Vitals:    BP: (!) 170/76  Resp: (!) 25    Temp  Min: 97.6 °F (36.4 °C)  Max: 97.6 °F (36.4 °C)  Pulse  Min: 94  Max: 94  BP  Min: 153/64  Max: 170/76  Resp  Min: 20  Max: 25  SpO2  Min: 99 %  Max: 99 %    No intake/output data recorded.            Physical Exam  Constitutional:       General: She is in acute distress.   HENT:      Head: Normocephalic and atraumatic.   Cardiovascular:      Rate and Rhythm: Normal rate and regular rhythm.   Pulmonary:      Effort: Pulmonary effort is normal. No respiratory distress.   Abdominal:      Palpations: Abdomen is soft.   Neurological:      Comments: Sedation: None  E3V5M6  AAOx3  Follows all commands  Pupils brisk  PERRL  Patient refusing to participate in EOM 2/2 pain  BARBY and AG  SILT     Patient refusing to lay on back 2/2 pain             Today I personally reviewed pertinent medications, lines/drains/airways, imaging, cardiology results, laboratory results, microbiology results, notably: CT/CTA

## 2024-01-11 NOTE — ASSESSMENT & PLAN NOTE
57 y/o presents to [facility] with thunderclap HA. Per OHS, reportedly no acute intracranial abnormality on CT head, though upon review at bedside, area of questionable hyperdensity with A Comm aneurysm on CTA. LP performed at [facility] with reported xanthochromia, though not documented on paper chart. Further LP results from [facility] as follows CSF color, red; ROSEMARY CSF, bloody; CSF volume, 14; WBC, 31; RBC 51,000; Protein 64.8; Glucose 73; Gram stain without organisms seen.    -Admit to [facility]  -NSGY, VN consulted  -q1h neuro checks, vital checks  -EKG, ECHO, CXR  -A1c, TSH, lipid panel, coag panel  -Daily CBC, CMP, mag, phos  -SBP < 140, prn labetalol and hydralazine  -SCDs, hold DVT chemoppx in acute period   -Keppra 500 bid  -Nimotop 60q4h  -PT/OT  -MRI/MRA w/wo with vessel wall imaging pending  -IR consutled for DSA  No need for EVD as of yet  Probable coiling today, 01/11

## 2024-01-11 NOTE — NURSING
~1115: patient transported to CT via bed on portable monitor, IV pole, and 2L NC via bed with RN x1. Patient tolerated transportation and scan well.     Patient was then taken to IR and handed off with CRNA for DSA.

## 2024-01-11 NOTE — SEDATION DOCUMENTATION
Hemostasis achieved via right groin with use of Vascade closure device at 1526. Pt to lay flat for 2 hours until 1726

## 2024-01-11 NOTE — ANESTHESIA PROCEDURE NOTES
Arterial    Diagnosis: A Comm Aneurysm Suspected Rupture. Class A    Patient location during procedure: done in OR  Timeout: 1/11/2024 12:03 PM  Procedure end time: 1/11/2024 12:07 PM    Staffing  Authorizing Provider: Raji Mattson Jr., MD  Performing Provider: Nicholas Macdonald CRNA    Staffing  Other anesthesia staff: Hazel Wilson  Performed by: Nicholas Macdonald CRNA  Authorized by: Raji Mattson Jr., MD    Anesthesiologist was present at the time of the procedure.    Preanesthetic Checklist  Completed: patient identified, IV checked, site marked, risks and benefits discussed, surgical consent, monitors and equipment checked, pre-op evaluation, timeout performed and anesthesia consent givenArterial  Skin Prep: chlorhexidine gluconate  Local Infiltration: none  Orientation: left  Location: radial    Catheter Size: 20 G  Catheter placement by Anatomical landmarks. Heme positive aspiration all ports. Insertion Attempts: 1  Assessment  Dressing: tegaderm  Patient: Tolerated well

## 2024-01-11 NOTE — CONSULTS
[facility] - Neuro Critical Care  Neurosurgery  Consult Note    Inpatient consult to Neurosurgery  Consult performed by: Krystian Miranda MD  Consult ordered by: Abbi Linares, TOSHIA        Subjective:     Chief Complaint/Reason for Admission: headache    History of Present Illness: 57 yo F with PMH of hypothyroidism who presents as transfer from [facility] [employer] due to thunderclap headache that started suddenly on Friday, 6 days ago. She reports her headache has worsened more and more over the last week so she came to the hospital. She denies any illicit drug use or blood thinners. She reports her headache is a 10 out of 10. 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 Per report there was xanthochromia on LP performed at OSH on 11/10/24. NSGY consulted for aneurysm.     No medications prior to admission.       Review of patient's allergies indicates:  Not on File    No past medical history on file.  No past surgical history on file.  Family History    None       Tobacco Use    Smoking status: Not on file    Smokeless tobacco: Not on file   Substance and Sexual Activity    Alcohol use: Not on file    Drug use: Not on file    Sexual activity: Not on file     Review of Systems   Neurological:  Positive for headaches.   All other systems reviewed and are negative.    Objective:        There is no height or weight on file to calculate BMI.  Vital Signs (Most Recent):  Resp: (!) 22 (01/11/24 0002)  BP: (!) 170/76 (01/10/24 2219) Vital Signs (24h Range):  Temp:  [97.6 °F (36.4 °C)] 97.6 °F (36.4 °C)  Pulse:  [94] 94  Resp:  [20-25] 22  SpO2:  [99 %] 99 %  BP: (153-170)/(64-76) 170/76                                 Physical Exam  Constitutional:       Appearance: She is well-developed and well-nourished.   Eyes:      Extraocular Movements: EOM normal.      Conjunctiva/sclera: Conjunctivae normal.      Pupils: Pupils are equal, round, and reactive to light.  "  Cardiovascular:      Pulses: Normal pulses.   Abdominal:      Palpations: Abdomen is soft.   Neurological:      Mental Status: She is alert and oriented to person, place, and time.      Comments: E3V5M6  AAOx3  PERRL  Cranial nerves intact  Vision grossly normal  Patient follows commands all extremities; slightly uncooperative   At least 4/5 strength in all extremities; non focal on exam\  SILT  DTRs normal              Physical Exam:    Constitutional: She appears well-developed and well-nourished.     Eyes: Pupils are equal, round, and reactive to light. Conjunctivae and EOM are normal.     Cardiovascular: Normal pulses.     Abdominal: Soft.     Psych/Behavior: She is alert. She is oriented to person, place, and time.     Neurological:   E3V5M6  AAOx3  PERRL  Cranial nerves intact  Vision grossly normal  Patient follows commands all extremities; slightly uncooperative   At least 4/5 strength in all extremities; non focal on exam\  SILT  DTRs normal       Significant Labs:  Recent Labs   Lab 01/10/24  2247   *      K 3.4*   *   CO2 16*   BUN 13   CREATININE 0.6   CALCIUM 8.6*   MG 2.1     Recent Labs   Lab 01/10/24  2247   WBC 17.79*   HGB 14.7   HCT 43.7        No results for input(s): "LABPT", "INR", "APTT" in the last 48 hours.  Microbiology Results (last 7 days)       ** No results found for the last 168 hours. **          All pertinent labs from the last 24 hours have been reviewed.    Significant Diagnostics:  I have reviewed all pertinent imaging results/findings within the past 24 hours.  Assessment/Plan:     * Anterior communicating artery aneurysm  59 yo F with PMH of hypothyroidism who presents as transfer from [facility] due to thunderclap headache that started suddenly on Friday, 6 days ago. She reports her headache has worsened more and more over the last week so she came to the hospital. She denies any illicit drug use or blood thinners. She reports her headache is a 10 " out of 10.     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   Per report there was xanthochromia on LP performed at OSH on 11/10/24.     Presumably post bleed day 6    - admitted to NCC, q1h neuro checks  - recommend IR consult for angiogram with plans to treat aneursym  - SAH protocol // vasospasm watch   - keppra for seizure ppx   - Na > 135   - nimotop x21 days   - TCDs x14 days   - SBP < 140  - on room air, ctm  - strict I/O's; keep euvolemic to 1L net positive  - NPO, maintenance fluid  - hold any blood thinners  - rest of care per NCC; nsgy will continue to follow        Thank you for your consult. I will follow-up with patient. Please contact us if you have any additional questions.    Krystian Miranda MD  Neurosurgery  [facility] Neuro Critical [facility]

## 2024-01-11 NOTE — PROGRESS NOTES
[facility] - Neuro Critical Care  Neurocritical Care  Progress Note    Admit Date: 1/10/2024  Service Date: 01/11/2024  Length of Stay: 1    Subjective:     Chief Complaint: Anterior communicating artery aneurysm    History of Present Illness: Ms. Loreto Goff is a 59 y/o F with a PMHx of depression and hypothyroidism who presents to [facility] with thunderclap HA. She initially presented to [facility] on 01/10, but states that her symtoms consisting of neck pain radiating the front of her head, photophobia, and R leg numbness started on 01/05.  No acute intracranial abnormality on CT reported from [facility], though questionable area of hyperdensity upon review of disc at [facility].  A Comm aneurysm on CTA. LP performed at [facility] with reported xanthochromia. Further LP results from [facility] as follows CSF color, red; ROSEMARY CSF, bloody; CSF volume, 14; WBC, 31; RBC 51,000; Protein 64.8; Glucose 73; Gram stain without organisms seen. She will be admitted to [facility] for hourly neuro-monitoring and a higher level of care.       Hospital Course: 01/11: persistent headache, with sats in low 90s, use decadron rather then dilaudid, awaiting angio on cardene for sbp control       No past medical history on file.  No past surgical history on file.   No current facility-administered medications on file prior to encounter.     No current outpatient medications on file prior to encounter.      Allergies: Patient has no allergy information on record.  No family history on file.     Review of Systems   Eyes:  Positive for photophobia and visual disturbance.   Gastrointestinal:  Negative for nausea and vomiting.   Musculoskeletal:  Positive for back pain and neck pain.   Neurological:  Positive for weakness, numbness and headaches.     Objective:     Vitals:    Pulse: 76  Rhythm: sinus tachycardia  BP: (!) 171/76  MAP (mmHg): 109  Resp: (!) 22  SpO2: (!) 92 %    Temp  Min: 97.6 °F (36.4 °C)  Max: 97.6 °F (36.4 °C)  Pulse  Min: 76  Max: 94  BP  Min: 153/64  Max:  190/77  MAP (mmHg)  Min: 109  Max: 111  Resp  Min: 20  Max: 36  SpO2  Min: 92 %  Max: 99 %    No intake/output data recorded.            Physical Exam  Constitutional:       General: She is not in acute distress.  HENT:      Head: Normocephalic and atraumatic.   Cardiovascular:      Rate and Rhythm: Normal rate and regular rhythm.   Pulmonary:      Effort: Pulmonary effort is normal. No respiratory distress.   Abdominal:      Palpations: Abdomen is soft.   Musculoskeletal:      Comments: TTP overlying R hip preventing patient from lying flat   Neurological:      Comments: Sedation: None  E3V5M6  AAOx3  Follows all commands  Pupils brisk  PERRL  Patient refusing to participate in EOM 2/2 pain  BARBY and AG  SILT     Patient refusing to lay on back 2/2 pain             Today I personally reviewed pertinent medications, lines/drains/airways, imaging, cardiology results, laboratory results, microbiology results,        Assessment/Plan:     Neuro  * Anterior communicating artery aneurysm  57 y/o presents to [facility] with thunderclap HA. Per OHS, reportedly no acute intracranial abnormality on CT head, though upon review at bedside, area of questionable hyperdensity with A Comm aneurysm on CTA. LP performed at [facility] with reported xanthochromia, though not documented on paper chart. Further LP results from [facility] as follows CSF color, red; ROSEMARY CSF, bloody; CSF volume, 14; WBC, 31; RBC 51,000; Protein 64.8; Glucose 73; Gram stain without organisms seen.    -Admit to [facility]  -NSGY, VN consulted  -q1h neuro checks, vital checks  -EKG, ECHO, CXR  -A1c, TSH, lipid panel, coag panel  -Daily CBC, CMP, mag, phos  -SBP < 140, prn labetalol and hydralazine  -SCDs, hold DVT chemoppx in acute period   -Keppra 500 bid  -Nimotop 60q4h  -PT/OT  -MRI/MRA w/wo with vessel wall imaging pending  -IR consutled for DSA  No need for EVD as of yet  Probable coiling today, 01/11    Thunderclap headache  Pain control as needed  See primary problem      Cardiac/Vascular  Essential hypertension  SBP < 140   On cardene, cont until aneurysm secured            The patient is being Prophylaxed for:  Venous Thromboembolism with: Mechanical  Stress Ulcer with: None  Ventilator Pneumonia with: not applicable    Activity Orders            Turn patient starting at 01/10 2200    Elevate HOB starting at 01/10 2156    Diet NPO: NPO starting at 01/10 2156        CRC 36 min  Full Code    Jacob Mcdonough MD  Neurocritical Care  [facility] - Neuro Critical Care

## 2024-01-11 NOTE — CONSULTS
"  [facility]  Adult Nutrition  Consult Note    SUMMARY     Recommendations    When able, ADAT to Regular (texture per SLP).  If unable to advance diet & TFs warranted, please re-consult.  RD following.    Goals: Meet % EEN, EPN by RD f/u date  Nutrition Goal Status: new  Communication of RD Recs: other (comment) (POC)    Assessment and Plan    Nutrition Problem:  Inadequate energy intake    Related to (etiology):   Inability to consume sufficient energy     Signs and Symptoms (as evidenced by):   NPO    Interventions(treatment strategy):  Collaboration of nutrition care w/ other providers    Nutrition Diagnosis Status:   New    Reason for Assessment    Reason For Assessment: consult  Diagnosis: other (see comments) (Anterior communicating artery aneursym)  Relevant Medical History: -  Interdisciplinary Rounds: did not attend    General Information Comments: NPO for possible DSA today. RD working remotely - unsure of energy intake PTA/UBW (no wt hx in chart). RD to complete full assessment at time of follow-up.  Nutrition Discharge Planning: Adequate nutrition    Nutrition/Diet History    Factors Affecting Nutritional Intake: NPO    Anthropometrics    Temp: 98.7 °F (37.1 °C)  Height Method: Stated  Height: 5' 7.99" (172.7 cm)  Height (inches): 67.99 in  Weight Method: Bed Scale  Weight: 97.4 kg (214 lb 11.7 oz)  Weight (lb): 214.73 lb  Ideal Body Weight (IBW), Female: 139.95 lb  % Ideal Body Weight, Female (lb): 153.43 %  BMI (Calculated): 32.7  BMI Grade: 30 - 34.9- obesity - grade I    Lab/Procedures/Meds    Pertinent Labs Reviewed: reviewed  Pertinent Medications Reviewed: reviewed  Pertinent Medications Comments: Nicardipine    Estimated/Assessed Needs    Weight Used For Calorie Calculations: 97.4 kg (214 lb 11.7 oz)    Energy Calorie Requirements (kcal): 1756 kcal/d  Energy Need Method: Hubbard-St Andrea (1.1 PAL)    Protein Requirements:  g/d (.9-1.1 g/kg)  Weight Used For Protein " Calculations: 97.4 kg (214 lb 11.7 oz)    Estimated Fluid Requirement Method: other (see comments) (Per MD or 1 mL/kcal)  RDA Method (mL): 1756    Nutrition Prescription Ordered    Current Diet Order: NPO    Evaluation of Received Nutrient/Fluid Intake    I/O: -4.2L since admit    Comments: LBM: 1/9    Nutrition Risk    Level of Risk/Frequency of Follow-up:  (1x/week)     Monitor and Evaluation    Food and Nutrient Intake: enteral nutrition intake, food and beverage intake, energy intake  Food and Nutrient Adminstration: diet order, enteral and parenteral nutrition administration  Physical Activity and Function: nutrition-related ADLs and IADLs  Anthropometric Measurements: weight, weight change  Biochemical Data, Medical Tests and Procedures: inflammatory profile, lipid profile, glucose/endocrine profile, gastrointestinal profile, electrolyte and renal panel  Nutrition-Focused Physical Findings: overall appearance     Nutrition Follow-Up    RD Follow-up?: Yes

## 2024-01-11 NOTE — ASSESSMENT & PLAN NOTE
-Likely due to suspected SAH  -HA management/pain control per NCC  -See Anterior communicating artery aneurysm for additional details

## 2024-01-12 LAB
ALBUMIN SERPL BCP-MCNC: 3 G/DL (ref 3.5–5.2)
ALP SERPL-CCNC: 84 U/L (ref 55–135)
ALT SERPL W/O P-5'-P-CCNC: 65 U/L (ref 10–44)
ANION GAP SERPL CALC-SCNC: 9 MMOL/L (ref 8–16)
AST SERPL-CCNC: 40 U/L (ref 10–40)
BASOPHILS # BLD AUTO: 0.01 K/UL (ref 0–0.2)
BASOPHILS NFR BLD: 0.1 % (ref 0–1.9)
BILIRUB SERPL-MCNC: 0.3 MG/DL (ref 0.1–1)
BUN SERPL-MCNC: 12 MG/DL (ref 6–20)
CALCIUM SERPL-MCNC: 8.5 MG/DL (ref 8.7–10.5)
CHLORIDE SERPL-SCNC: 114 MMOL/L (ref 95–110)
CO2 SERPL-SCNC: 18 MMOL/L (ref 23–29)
CREAT SERPL-MCNC: 0.6 MG/DL (ref 0.5–1.4)
DIFFERENTIAL METHOD BLD: ABNORMAL
EOSINOPHIL # BLD AUTO: 0 K/UL (ref 0–0.5)
EOSINOPHIL NFR BLD: 0 % (ref 0–8)
ERYTHROCYTE [DISTWIDTH] IN BLOOD BY AUTOMATED COUNT: 13.8 % (ref 11.5–14.5)
EST. GFR  (NO RACE VARIABLE): >60 ML/MIN/1.73 M^2
GLUCOSE SERPL-MCNC: 157 MG/DL (ref 70–110)
HCT VFR BLD AUTO: 35.3 % (ref 37–48.5)
HGB BLD-MCNC: 11.7 G/DL (ref 12–16)
IMM GRANULOCYTES # BLD AUTO: 0.06 K/UL (ref 0–0.04)
IMM GRANULOCYTES NFR BLD AUTO: 0.5 % (ref 0–0.5)
LYMPHOCYTES # BLD AUTO: 0.7 K/UL (ref 1–4.8)
LYMPHOCYTES NFR BLD: 5.9 % (ref 18–48)
MAGNESIUM SERPL-MCNC: 2.4 MG/DL (ref 1.6–2.6)
MCH RBC QN AUTO: 29.2 PG (ref 27–31)
MCHC RBC AUTO-ENTMCNC: 33.1 G/DL (ref 32–36)
MCV RBC AUTO: 88 FL (ref 82–98)
MONOCYTES # BLD AUTO: 0.2 K/UL (ref 0.3–1)
MONOCYTES NFR BLD: 1.5 % (ref 4–15)
NEUTROPHILS # BLD AUTO: 10.8 K/UL (ref 1.8–7.7)
NEUTROPHILS NFR BLD: 92 % (ref 38–73)
NRBC BLD-RTO: 0 /100 WBC
PHOSPHATE SERPL-MCNC: 2.3 MG/DL (ref 2.7–4.5)
PLATELET # BLD AUTO: 244 K/UL (ref 150–450)
PMV BLD AUTO: 10.9 FL (ref 9.2–12.9)
POCT GLUCOSE: 128 MG/DL (ref 70–110)
POCT GLUCOSE: 154 MG/DL (ref 70–110)
POCT GLUCOSE: 169 MG/DL (ref 70–110)
POCT GLUCOSE: 175 MG/DL (ref 70–110)
POCT GLUCOSE: 185 MG/DL (ref 70–110)
POCT GLUCOSE: 188 MG/DL (ref 70–110)
POTASSIUM SERPL-SCNC: 3.5 MMOL/L (ref 3.5–5.1)
PROT SERPL-MCNC: 6.1 G/DL (ref 6–8.4)
RBC # BLD AUTO: 4.01 M/UL (ref 4–5.4)
SODIUM SERPL-SCNC: 141 MMOL/L (ref 136–145)
WBC # BLD AUTO: 11.76 K/UL (ref 3.9–12.7)

## 2024-01-12 PROCEDURE — 25000003 PHARM REV CODE 250

## 2024-01-12 PROCEDURE — 80053 COMPREHEN METABOLIC PANEL: CPT

## 2024-01-12 PROCEDURE — 51798 US URINE CAPACITY MEASURE: CPT

## 2024-01-12 PROCEDURE — 92523 SPEECH SOUND LANG COMPREHEN: CPT

## 2024-01-12 PROCEDURE — 99233 SBSQ HOSP IP/OBS HIGH 50: CPT | Mod: ,,, | Performed by: STUDENT IN AN ORGANIZED HEALTH CARE EDUCATION/TRAINING PROGRAM

## 2024-01-12 PROCEDURE — 94761 N-INVAS EAR/PLS OXIMETRY MLT: CPT

## 2024-01-12 PROCEDURE — 63600175 PHARM REV CODE 636 W HCPCS

## 2024-01-12 PROCEDURE — 99900035 HC TECH TIME PER 15 MIN (STAT)

## 2024-01-12 PROCEDURE — 99291 CRITICAL CARE FIRST HOUR: CPT | Mod: ,,, | Performed by: PSYCHIATRY & NEUROLOGY

## 2024-01-12 PROCEDURE — 85025 COMPLETE CBC W/AUTO DIFF WBC: CPT

## 2024-01-12 PROCEDURE — 51701 INSERT BLADDER CATHETER: CPT

## 2024-01-12 PROCEDURE — 63600175 PHARM REV CODE 636 W HCPCS: Performed by: PSYCHIATRY & NEUROLOGY

## 2024-01-12 PROCEDURE — 27000221 HC OXYGEN, UP TO 24 HOURS

## 2024-01-12 PROCEDURE — 20000000 HC ICU ROOM

## 2024-01-12 PROCEDURE — 84100 ASSAY OF PHOSPHORUS: CPT

## 2024-01-12 PROCEDURE — 83735 ASSAY OF MAGNESIUM: CPT

## 2024-01-12 PROCEDURE — 25000003 PHARM REV CODE 250: Performed by: PSYCHIATRY & NEUROLOGY

## 2024-01-12 PROCEDURE — 92610 EVALUATE SWALLOWING FUNCTION: CPT

## 2024-01-12 RX ORDER — MORPHINE SULFATE 2 MG/ML
2 INJECTION, SOLUTION INTRAMUSCULAR; INTRAVENOUS ONCE
Status: COMPLETED | OUTPATIENT
Start: 2024-01-12 | End: 2024-01-12

## 2024-01-12 RX ORDER — LABETALOL HCL 20 MG/4 ML
10 SYRINGE (ML) INTRAVENOUS EVERY 4 HOURS PRN
Status: DISCONTINUED | OUTPATIENT
Start: 2024-01-12 | End: 2024-01-15

## 2024-01-12 RX ORDER — MORPHINE SULFATE 2 MG/ML
1 INJECTION, SOLUTION INTRAMUSCULAR; INTRAVENOUS EVERY 4 HOURS PRN
Status: DISCONTINUED | OUTPATIENT
Start: 2024-01-12 | End: 2024-01-13

## 2024-01-12 RX ORDER — METHOCARBAMOL 500 MG/1
500 TABLET, FILM COATED ORAL 4 TIMES DAILY
Status: DISCONTINUED | OUTPATIENT
Start: 2024-01-12 | End: 2024-01-16

## 2024-01-12 RX ORDER — METOCLOPRAMIDE HYDROCHLORIDE 5 MG/ML
10 INJECTION INTRAMUSCULAR; INTRAVENOUS ONCE
Status: COMPLETED | OUTPATIENT
Start: 2024-01-12 | End: 2024-01-12

## 2024-01-12 RX ORDER — HYDRALAZINE HYDROCHLORIDE 20 MG/ML
10 INJECTION INTRAMUSCULAR; INTRAVENOUS EVERY 4 HOURS PRN
Status: DISCONTINUED | OUTPATIENT
Start: 2024-01-12 | End: 2024-01-15

## 2024-01-12 RX ORDER — ENOXAPARIN SODIUM 100 MG/ML
40 INJECTION SUBCUTANEOUS EVERY 24 HOURS
Status: DISCONTINUED | OUTPATIENT
Start: 2024-01-12 | End: 2024-01-24 | Stop reason: HOSPADM

## 2024-01-12 RX ORDER — SILODOSIN 4 MG/1
4 CAPSULE ORAL DAILY
Status: DISCONTINUED | OUTPATIENT
Start: 2024-01-13 | End: 2024-01-14

## 2024-01-12 RX ORDER — MAGNESIUM SULFATE HEPTAHYDRATE 40 MG/ML
2 INJECTION, SOLUTION INTRAVENOUS ONCE
Status: COMPLETED | OUTPATIENT
Start: 2024-01-12 | End: 2024-01-12

## 2024-01-12 RX ADMIN — GABAPENTIN 400 MG: 400 CAPSULE ORAL at 10:01

## 2024-01-12 RX ADMIN — SODIUM CHLORIDE 500 ML: 9 INJECTION, SOLUTION INTRAVENOUS at 01:01

## 2024-01-12 RX ADMIN — GABAPENTIN 400 MG: 400 CAPSULE ORAL at 06:01

## 2024-01-12 RX ADMIN — MAGNESIUM SULFATE HEPTAHYDRATE 2 G: 40 INJECTION, SOLUTION INTRAVENOUS at 01:01

## 2024-01-12 RX ADMIN — HYDRALAZINE HYDROCHLORIDE 10 MG: 20 INJECTION, SOLUTION INTRAMUSCULAR; INTRAVENOUS at 01:01

## 2024-01-12 RX ADMIN — MUPIROCIN: 20 OINTMENT TOPICAL at 09:01

## 2024-01-12 RX ADMIN — ENOXAPARIN SODIUM 40 MG: 100 INJECTION SUBCUTANEOUS at 04:01

## 2024-01-12 RX ADMIN — LEVETIRACETAM 500 MG: 500 TABLET, FILM COATED ORAL at 09:01

## 2024-01-12 RX ADMIN — NIMODIPINE 60 MG: 30 CAPSULE, LIQUID FILLED ORAL at 02:01

## 2024-01-12 RX ADMIN — POTASSIUM & SODIUM PHOSPHATES POWDER PACK 280-160-250 MG 2 PACKET: 280-160-250 PACK at 08:01

## 2024-01-12 RX ADMIN — OXYCODONE HYDROCHLORIDE 5 MG: 5 TABLET ORAL at 08:01

## 2024-01-12 RX ADMIN — SENNOSIDES AND DOCUSATE SODIUM 1 TABLET: 50; 8.6 TABLET ORAL at 08:01

## 2024-01-12 RX ADMIN — GABAPENTIN 400 MG: 400 CAPSULE ORAL at 01:01

## 2024-01-12 RX ADMIN — INSULIN ASPART 2 UNITS: 100 INJECTION, SOLUTION INTRAVENOUS; SUBCUTANEOUS at 01:01

## 2024-01-12 RX ADMIN — ACETAMINOPHEN 1000 MG: 500 TABLET ORAL at 08:01

## 2024-01-12 RX ADMIN — METHOCARBAMOL 500 MG: 500 TABLET ORAL at 04:01

## 2024-01-12 RX ADMIN — METOCLOPRAMIDE 10 MG: 5 INJECTION, SOLUTION INTRAMUSCULAR; INTRAVENOUS at 01:01

## 2024-01-12 RX ADMIN — NIMODIPINE 60 MG: 30 CAPSULE, LIQUID FILLED ORAL at 01:01

## 2024-01-12 RX ADMIN — LEVOTHYROXINE SODIUM 25 MCG: 25 TABLET ORAL at 06:01

## 2024-01-12 RX ADMIN — MORPHINE SULFATE 1 MG: 2 INJECTION, SOLUTION INTRAMUSCULAR; INTRAVENOUS at 04:01

## 2024-01-12 RX ADMIN — METHOCARBAMOL 500 MG: 500 TABLET ORAL at 09:01

## 2024-01-12 RX ADMIN — NIMODIPINE 60 MG: 30 CAPSULE, LIQUID FILLED ORAL at 05:01

## 2024-01-12 RX ADMIN — MORPHINE SULFATE 1 MG: 2 INJECTION, SOLUTION INTRAMUSCULAR; INTRAVENOUS at 03:01

## 2024-01-12 RX ADMIN — METHOCARBAMOL 500 MG: 500 TABLET ORAL at 08:01

## 2024-01-12 RX ADMIN — INSULIN ASPART 1 UNITS: 100 INJECTION, SOLUTION INTRAVENOUS; SUBCUTANEOUS at 01:01

## 2024-01-12 RX ADMIN — METHOCARBAMOL 500 MG: 500 TABLET ORAL at 01:01

## 2024-01-12 RX ADMIN — POTASSIUM BICARBONATE 50 MEQ: 978 TABLET, EFFERVESCENT ORAL at 03:01

## 2024-01-12 RX ADMIN — INSULIN ASPART 2 UNITS: 100 INJECTION, SOLUTION INTRAVENOUS; SUBCUTANEOUS at 06:01

## 2024-01-12 RX ADMIN — OXYCODONE HYDROCHLORIDE 5 MG: 5 TABLET ORAL at 10:01

## 2024-01-12 RX ADMIN — MORPHINE SULFATE 1 MG: 2 INJECTION, SOLUTION INTRAMUSCULAR; INTRAVENOUS at 09:01

## 2024-01-12 RX ADMIN — NIMODIPINE 60 MG: 30 CAPSULE, LIQUID FILLED ORAL at 09:01

## 2024-01-12 RX ADMIN — NIMODIPINE 60 MG: 30 CAPSULE, LIQUID FILLED ORAL at 06:01

## 2024-01-12 RX ADMIN — MORPHINE SULFATE 2 MG: 2 INJECTION, SOLUTION INTRAMUSCULAR; INTRAVENOUS at 06:01

## 2024-01-12 RX ADMIN — NIMODIPINE 60 MG: 30 CAPSULE, LIQUID FILLED ORAL at 10:01

## 2024-01-12 RX ADMIN — POTASSIUM & SODIUM PHOSPHATES POWDER PACK 280-160-250 MG 2 PACKET: 280-160-250 PACK at 03:01

## 2024-01-12 RX ADMIN — ACETAMINOPHEN 1000 MG: 500 TABLET ORAL at 10:01

## 2024-01-12 NOTE — SUBJECTIVE & OBJECTIVE
Interval History: angio and coil yesterday, exam stable, liberalize BP today, TCDs daily    Review of patient's allergies indicates:  No Known Allergies    History reviewed. No pertinent past medical history.  History reviewed. No pertinent surgical history.  Family History    None       Tobacco Use    Smoking status: Not on file    Smokeless tobacco: Not on file   Substance and Sexual Activity    Alcohol use: Not on file    Drug use: Not on file    Sexual activity: Not on file     Review of Systems   Neurological:  Positive for headaches.   All other systems reviewed and are negative.    Objective:     Weight: 97.1 kg (214 lb)  Body mass index is 33.52 kg/m².  Vital Signs (Most Recent):  Temp: 98.6 °F (37 °C) (01/11/24 2326)  Pulse: 91 (01/12/24 0626)  Resp: 15 (01/12/24 0626)  BP: (!) 156/70 (01/12/24 0629)  SpO2: (!) 93 % (01/12/24 0626) Vital Signs (24h Range):  Temp:  [37.4 °F (3 °C)-98.6 °F (37 °C)] 98.6 °F (37 °C)  Pulse:  [76-93] 91  Resp:  [14-24] 15  SpO2:  [87 %-98 %] 93 %  BP: (116-178)/(47-78) 156/70  Arterial Line BP: (122-167)/(44-63) 156/49                                 Physical Exam  Constitutional:       Appearance: She is well-developed and well-nourished.   Eyes:      Extraocular Movements: EOM normal.      Conjunctiva/sclera: Conjunctivae normal.      Pupils: Pupils are equal, round, and reactive to light.   Cardiovascular:      Pulses: Normal pulses.   Abdominal:      Palpations: Abdomen is soft.   Neurological:      Mental Status: She is alert and oriented to person, place, and time.      Comments: E3V5M6  AAOx3  PERRL  Cranial nerves intact  Vision grossly normal  Patient follows commands all extremities; slightly uncooperative   At least 4/5 strength in all extremities; non focal on exam\  SILT  DTRs normal              Physical Exam:    Constitutional: She appears well-developed and well-nourished.     Eyes: Pupils are equal, round, and reactive to light. Conjunctivae and EOM are normal.      Cardiovascular: Normal pulses.     Abdominal: Soft.     Psych/Behavior: She is alert. She is oriented to person, place, and time.     Neurological:   E3V5M6  AAOx3  PERRL  Cranial nerves intact  Vision grossly normal  Patient follows commands all extremities; slightly uncooperative   At least 4/5 strength in all extremities; non focal on exam\  SILT  DTRs normal       Significant Labs:  Recent Labs   Lab 01/10/24  2247 01/11/24  0552 01/12/24  0127   * 122* 157*    136 141   K 3.4* 3.2* 3.5   * 110 114*   CO2 16* 18* 18*   BUN 13 12 12   CREATININE 0.6 0.6 0.6   CALCIUM 8.6* 8.6* 8.5*   MG 2.1 2.4 2.4       Recent Labs   Lab 01/10/24  2247 01/11/24  0552 01/12/24  0127   WBC 17.79* 15.83* 11.76   HGB 14.7 13.9 11.7*   HCT 43.7 43.3 35.3*    266 244       Recent Labs   Lab 01/11/24 0552   INR 1.0   APTT 24.0     Microbiology Results (last 7 days)       ** No results found for the last 168 hours. **          All pertinent labs from the last 24 hours have been reviewed.    Significant Diagnostics:  I have reviewed all pertinent imaging results/findings within the past 24 hours.

## 2024-01-12 NOTE — ANESTHESIA POSTPROCEDURE EVALUATION
Anesthesia Post Evaluation    Patient: Loreto Goff    Procedure(s) Performed: * No procedures listed *    Final Anesthesia Type: general      Patient location during evaluation: ICU  Patient participation: Yes- Able to Participate  Level of consciousness: awake and alert  Post-procedure vital signs: reviewed and stable  Pain management: adequate  Airway patency: patent    PONV status at discharge: No PONV  Anesthetic complications: no      Cardiovascular status: blood pressure returned to baseline  Respiratory status: spontaneous ventilation and nasal cannula  Hydration status: euvolemic  Follow-up not needed.              Vitals Value Taken Time   /70 01/12/24 1131   Temp 36.9 °C (98.5 °F) 01/12/24 0726   Pulse 91 01/12/24 1200   Resp 20 01/12/24 1200   SpO2 96 % 01/12/24 1200   Vitals shown include unvalidated device data.      No case tracking events are documented in the log.      Pain/Rosmery Score: Pain Rating Prior to Med Admin: 10 (1/12/2024 10:20 AM)

## 2024-01-12 NOTE — ASSESSMENT & PLAN NOTE
57 yo F with PMH of hypothyroidism who presents as transfer from [place] due to thunderclap headache that started suddenly on Friday, 6 days ago. She reports her headache has worsened more and more over the last week so she came to the hospital. She denies any illicit drug use or blood thinners. She reports her headache is a 10 out of 10.     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   Per report there was xanthochromia on LP performed at OSH on 11/10/24.     Presumably post bleed day 7  Coil embolization of Acomm 1/11    - admitted to [facility], q1h neuro checks  - SAH protocol // vasospasm watch   - keppra for seizure ppx   - Na > 135   - nimotop x21 days   - TCDs x14 days   - SBP < 180 today!  - on room air, ctm  - strict I/O's; keep euvolemic to 1L net positive  - NPO, maintenance fluid  - hold any blood thinners  - rest of care per NCC; nsgy will continue to follow

## 2024-01-12 NOTE — PROGRESS NOTES
[facility]  Vascular Neurology  Comprehensive Stroke Center  Progress Note    Assessment/Plan:     * Anterior communicating artery aneurysm  Loreto Goff is a 58 y.o. female with PMH of depression, hypothyroidism transferred from [facility] for higher level care of suspected SAH and acomm aneurysm. Initially presented to OSH with sudden onset thunderclap HA with associated neck pain, photophobia, and RLE numbness. Symptoms began 1/5/24 and HA progressively worsened since, prompting her to present to OSH ED 1/10/24. CTA with large acomm aneurysm; LP with CSF with reported xanthochromia. [place] [facility] [place]H and [place]H; 7 mm acomm aneurysm. Patient now s/p coil of aneurysm on 1/11/24.      TCD pending. Patient with severe refractory headache worse when sitting up, associated light sensitivity.  Repeat CT stable      -Hold AC/AP due to concern for SAH  -Statin not indicated for SAH  -nimotop 60q4h, daily TCDs  -SBP goal <140, maintain MAP >65; can liberalized BP if needed to prevent vasospasm  -VTE PPx: SCDs, start sq heparin now that aneurysm is secured     Recurrent major depression in partial remission  -Per paper chart was previously on Trintellix  -Unclear if patient currently taking or not  -Continue home meds vs start medication as appropriate    Mild intermittent asthma without complication  -PRN duonebs    Acquired hypothyroidism  -TSH <0.010 on admit  -Continue home synthroid  -Consider endocrinology consult for management/medication adjustment recs    Essential hypertension  -SBP <140 for unsecure aneurysm, maintain MAP >65  -PRN labetalol/hydralazine  -can liberalize BP if needed to prevent vasospasm    Thunderclap headache  -Likely due to suspected SAH  -HA management/pain control per NCC  -See Anterior communicating artery aneurysm for additional details         1/12 S/p acomm coil yesterday, TCD pending. Patient with severe refractory headache worse when sitting up,  associated light sensitivity.  Repeat CT stable    STROKE DOCUMENTATION        NIH Scale:  1a. Level of Consciousness: 1-->Not alert, but arousable by minor stimulation to obey, answer, or respond  1b. LOC Questions: 0-->Answers both questions correctly  1c. LOC Commands: 0-->Performs both tasks correctly  2. Best Gaze: 0-->Normal  3. Visual: 0-->No visual loss  4. Facial Palsy: 0-->Normal symmetrical movements  5a. Motor Arm, Left: 0-->No drift, limb holds 90 (or 45) degrees for full 10 secs  5b. Motor Arm, Right: 0-->No drift, limb holds 90 (or 45) degrees for full 10 secs  6a. Motor Leg, Left: 0-->No drift, leg holds 30 degree position for full 5 secs  6b. Motor Leg, Right: 0-->No drift, leg holds 30 degree position for full 5 secs  7. Limb Ataxia: 0-->Absent  8. Sensory: 0-->Normal, no sensory loss  9. Best Language: 0-->No aphasia, normal  10. Dysarthria: 0-->Normal  11. Extinction and Inattention (formerly Neglect): 0-->No abnormality  Total (NIH Stroke Scale): 1       Modified Ray Score: 0  [place] Coma Scale:    ABCD2 Score:    FIOM1DH2-LBJ Score:   HAS -BLED Score:   ICH Score:   Hunt & Arevalo Classification:Grade II     Hemorrhagic change of an Ischemic Stroke: Does this patient have an ischemic stroke with hemorrhagic changes? No     Neurologic Chief Complaint: HA, neck pain, photophobia, RLE numbness    Subjective:     Interval History: Patient is seen for follow-up neurological assessment and treatment recommendations:  S/p acomm coil yesterday, TCD pending. Patient with severe refractory headache worse when sitting up, associated light sensitivity.  Repeat CT stable    HPI, Past Medical, Family, and Social History remains the same as documented in the initial encounter.     Review of Systems   Eyes:  Positive for photophobia.   Neurological:  Positive for headaches. Negative for speech difficulty and weakness.     Scheduled Meds:   gabapentin  400 mg Oral Q8H    levETIRAcetam  500 mg Oral BID     levothyroxine  25 mcg Oral Before breakfast    methocarbamoL  500 mg Oral QID    mupirocin   Nasal BID    niMODipine  60 mg Oral Q4H    senna-docusate 8.6-50 mg  1 tablet Oral Daily     Continuous Infusions:  PRN Meds:acetaminophen, albuterol-ipratropium, dextrose 10%, dextrose 10%, glucagon (human recombinant), hydrALAZINE, insulin aspart U-100, labetalol, magnesium oxide, magnesium oxide, morphine, oxyCODONE, potassium bicarbonate, potassium bicarbonate, potassium bicarbonate, potassium, sodium phosphates, potassium, sodium phosphates, potassium, sodium phosphates, sodium chloride 0.9%    Objective:     Vital Signs (Most Recent):  Temp: 98.5 °F (36.9 °C) (01/12/24 0726)  Pulse: 89 (01/12/24 1126)  Resp: 19 (01/12/24 1126)  BP: (!) 157/70 (01/12/24 1126)  SpO2: (!) 92 % (01/12/24 1126)  BP Location: Left arm    Vital Signs Range (Last 24H):  Temp:  [37.4 °F (3 °C)-98.6 °F (37 °C)]   Pulse:  [76-95]   Resp:  [15-24]   BP: (121-178)/(47-78)   SpO2:  [87 %-98 %]   Arterial Line BP: (122-184)/(44-63)   BP Location: Left arm       Physical Exam  Vitals reviewed.   HENT:      Head: Normocephalic and atraumatic.   Cardiovascular:      Rate and Rhythm: Normal rate.   Pulmonary:      Effort: Pulmonary effort is normal. No respiratory distress.   Skin:     General: Skin is warm and dry.   Neurological:      Mental Status: She is alert.              Neurological Exam:   LOC: drowsy  Attention Span: Good   Language: No aphasia  Articulation: No dysarthria  Orientation: Person, Place, Time   Visual Fields: Full  EOM (CN III, IV, VI): Full/intact  Facial Movement (CN VII): Symmetric facial expression    Motor: Arm left  Normal 5/5  Leg left  Normal 5/5  Arm right  Normal 5/5  Leg right Normal 5/5  Sensation: Intact to light touch, temperature and vibration  Tone: Normal tone throughout    Laboratory:  CMP:   Recent Labs   Lab 01/12/24  0127   CALCIUM 8.5*   ALBUMIN 3.0*   PROT 6.1      K 3.5   CO2 18*   *   BUN 12  "  CREATININE 0.6   ALKPHOS 84   ALT 65*   AST 40   BILITOT 0.3     CBC:   Recent Labs   Lab 01/12/24  0127   WBC 11.76   RBC 4.01   HGB 11.7*   HCT 35.3*      MCV 88   MCH 29.2   MCHC 33.1     Lipid Panel:   Recent Labs   Lab 01/10/24  2247   CHOL 209*   LDLCALC 131.2   HDL 40   TRIG 189*     Coagulation:   Recent Labs   Lab 01/11/24  0552   INR 1.0   APTT 24.0     Platelet Aggregation Study: No results for input(s): "PLTAGG", "PLTAGINTERP", "PLTAGREGLACO", "ADPPLTAGGREG" in the last 168 hours.  Hgb A1C:   Recent Labs   Lab 01/10/24  2247   HGBA1C 5.8*     TSH:   Recent Labs   Lab 01/10/24  2247   TSH <0.010*       Diagnostic Results     Brain Imaging   CT Head 1/12/24  Impression:     Recent coil embolization of the anterior communicating artery.  No new or worsening intracranial hemorrhage.  No hydrocephalus or intracranial mass effect.    CT Head Stealth 1/11/24  Impression:     Stealth protocol CT included for purposes procedural localization.     Small volume subtle subarachnoid and intraventricular hemorrhage.     7 mm anterior communicating artery aneurysm as further discussed above.     Mild atherosclerotic change as above without evidence of high-grade stenosis or large vessel occlusion.     Several thyroid nodules measuring up to 2 cm.  Dedicated thyroid ultrasound suggested on a nonemergent basis.    Vessel Imaging   IR Angio 1/11/24  Successful coiling of A-comm aneurysm using 4 coils.     CTA Head and Neck 1/11/24  Impression:     Stealth protocol CT included for purposes procedural localization.     Small volume subtle subarachnoid and intraventricular hemorrhage.     7 mm anterior communicating artery aneurysm as further discussed above.     Mild atherosclerotic change as above without evidence of high-grade stenosis or large vessel occlusion.     Several thyroid nodules measuring up to 2 cm.  Dedicated thyroid ultrasound suggested on a nonemergent basis.    Cardiac Imaging   TTE 1/11/24    " Left Ventricle: The left ventricle is normal in size. Ventricular mass is normal. Mildly increased wall thickness. There is concentric remodeling. Normal wall motion. There is hyperdynamic systolic function with a visually estimated ejection fraction of 70 - 75%. Ejection fraction by visual approximation is 73%. There is normal diastolic function. LVOT obstruction at rest  with peak gradient of 50 mmHg ( Valsalva was not performed.)    Right Ventricle: Normal right ventricular cavity size. Wall thickness is normal. Right ventricle wall motion  is normal. Systolic function is normal.    Left Atrium: Left atrium is moderately dilated.    Aortic Valve: The aortic valve is a trileaflet valve. There is mild aortic valve sclerosis. There is no significant regurgitation.    Mitral Valve: There is mild mitral annular calcification present. There is normal leaflet mobility. There is systolic anterior motion of the mitral valve. LVOT obstruction at rest with peak gradient of 50 mmHg ( Valsalva was not performed.) There is trace regurgitation.    Tricuspid Valve: There is mild regurgitation.    Pulmonary Artery: The estimated pulmonary artery systolic pressure is 22 mmHg.    IVC/SVC: Normal venous pressure at 3 mmHg.       Lizzeth Gonzalez PA-C  Comprehensive Stroke Center  Department of Vascular Neurology   [facility] - Neuro Critical Care

## 2024-01-12 NOTE — ASSESSMENT & PLAN NOTE
Loreto Goff is a 58 y.o. female with PMH of depression, hypothyroidism transferred from [facility] for higher level care of suspected SAH and acomm aneurysm. Initially presented to OSH with sudden onset thunderclap HA with associated neck pain, photophobia, and RLE numbness. Symptoms began 1/5/24 and HA progressively worsened since, prompting her to present to OSH ED 1/10/24. CTA with large acomm aneurysm; LP with CSF with reported xanthochromia. [place] [facility] [place] and [place]H; 7 mm acomm aneurysm. Patient now s/p coil of aneurysm on 1/11/24.      TCD pending. Patient with severe refractory headache worse when sitting up, associated light sensitivity.  Repeat CT stable      -Hold AC/AP due to concern for SAH  -Statin not indicated for SAH  -nimotop 60q4h, daily TCDs  -SBP goal <140, maintain MAP >65; can liberalized BP if needed to prevent vasospasm  -VTE PPx: SCDs, start sq heparin now that aneurysm is secured

## 2024-01-12 NOTE — PROGRESS NOTES
[facility] - Neuro Critical Care  Neurosurgery  Progress Note    Subjective:     History of Present Illness: 57 yo F with PMH of hypothyroidism who presents as transfer from [place] due to thunderclap headache that started suddenly on Friday, 6 days ago. She reports her headache has worsened more and more over the last week so she came to the hospital. She denies any illicit drug use or blood thinners. She reports her headache is a 10 out of 10. 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 Per report there was xanthochromia on LP performed at OSH on 11/10/24. NSGY consulted for aneurysm.     Post-Op Info:  * No surgery found *       Interval History: angio and coil yesterday, exam stable, liberalize BP today, TCDs daily    Review of patient's allergies indicates:  No Known Allergies    History reviewed. No pertinent past medical history.  History reviewed. No pertinent surgical history.  Family History    None       Tobacco Use    Smoking status: Not on file    Smokeless tobacco: Not on file   Substance and Sexual Activity    Alcohol use: Not on file    Drug use: Not on file    Sexual activity: Not on file     Review of Systems   Neurological:  Positive for headaches.   All other systems reviewed and are negative.    Objective:     Weight: 97.1 kg (214 lb)  Body mass index is 33.52 kg/m².  Vital Signs (Most Recent):  Temp: 98.6 °F (37 °C) (01/11/24 2326)  Pulse: 91 (01/12/24 0626)  Resp: 15 (01/12/24 0626)  BP: (!) 156/70 (01/12/24 0629)  SpO2: (!) 93 % (01/12/24 0626) Vital Signs (24h Range):  Temp:  [37.4 °F (3 °C)-98.6 °F (37 °C)] 98.6 °F (37 °C)  Pulse:  [76-93] 91  Resp:  [14-24] 15  SpO2:  [87 %-98 %] 93 %  BP: (116-178)/(47-78) 156/70  Arterial Line BP: (122-167)/(44-63) 156/49                                 Physical Exam  Constitutional:       Appearance: She is well-developed and well-nourished.   Eyes:      Extraocular Movements: EOM normal.       Conjunctiva/sclera: Conjunctivae normal.      Pupils: Pupils are equal, round, and reactive to light.   Cardiovascular:      Pulses: Normal pulses.   Abdominal:      Palpations: Abdomen is soft.   Neurological:      Mental Status: She is alert and oriented to person, place, and time.      Comments: E3V5M6  AAOx3  PERRL  Cranial nerves intact  Vision grossly normal  Patient follows commands all extremities; slightly uncooperative   At least 4/5 strength in all extremities; non focal on exam\  SILT  DTRs normal              Physical Exam:    Constitutional: She appears well-developed and well-nourished.     Eyes: Pupils are equal, round, and reactive to light. Conjunctivae and EOM are normal.     Cardiovascular: Normal pulses.     Abdominal: Soft.     Psych/Behavior: She is alert. She is oriented to person, place, and time.     Neurological:   E3V5M6  AAOx3  PERRL  Cranial nerves intact  Vision grossly normal  Patient follows commands all extremities; slightly uncooperative   At least 4/5 strength in all extremities; non focal on exam\  SILT  DTRs normal       Significant Labs:  Recent Labs   Lab 01/10/24  2247 01/11/24  0552 01/12/24  0127   * 122* 157*    136 141   K 3.4* 3.2* 3.5   * 110 114*   CO2 16* 18* 18*   BUN 13 12 12   CREATININE 0.6 0.6 0.6   CALCIUM 8.6* 8.6* 8.5*   MG 2.1 2.4 2.4       Recent Labs   Lab 01/10/24  2247 01/11/24  0552 01/12/24  0127   WBC 17.79* 15.83* 11.76   HGB 14.7 13.9 11.7*   HCT 43.7 43.3 35.3*    266 244       Recent Labs   Lab 01/11/24  0552   INR 1.0   APTT 24.0     Microbiology Results (last 7 days)       ** No results found for the last 168 hours. **          All pertinent labs from the last 24 hours have been reviewed.    Significant Diagnostics:  I have reviewed all pertinent imaging results/findings within the past 24 hours.  Assessment/Plan:     * Anterior communicating artery aneurysm  59 yo F with PMH of hypothyroidism who presents as transfer  from [place] due to thunderclap headache that started suddenly on Friday, 6 days ago. She reports her headache has worsened more and more over the last week so she came to the hospital. She denies any illicit drug use or blood thinners. She reports her headache is a 10 out of 10.     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   Per report there was xanthochromia on LP performed at OSH on 11/10/24.     Presumably post bleed day 7  Coil embolization of Acomm 1/11    - admitted to [facility], q1h neuro checks  - SAH protocol // vasospasm watch   - keppra for seizure ppx   - Na > 135   - nimotop x21 days   - TCDs x14 days   - SBP < 180 today!  - on room air, ctm  - strict I/O's; keep euvolemic to 1L net positive  - NPO, maintenance fluid  - hold any blood thinners  - rest of care per NCC; nsgy will continue to follow        Lb Mcintosh MD  Neurosurgery  [facility] - Neuro Critical Care

## 2024-01-12 NOTE — PT/OT/SLP PROGRESS
Occupational Therapy      Patient Name:  Loreto Goff   MRN:  [id number]    Patient not seen today secondary to patient declined OT evaluation 2* dizziness.  Therapist encouraged patient to participate in eval 1/13; patient verbalizing agreement. . Will follow-up 1/13.    1/12/2024

## 2024-01-12 NOTE — SUBJECTIVE & OBJECTIVE
Neurologic Chief Complaint: HA, neck pain, photophobia, RLE numbness    Subjective:     Interval History: Patient is seen for follow-up neurological assessment and treatment recommendations:  S/p acomm coil yesterday, TCD pending. Patient with severe refractory headache worse when sitting up, associated light sensitivity.  Repeat CT stable    HPI, Past Medical, Family, and Social History remains the same as documented in the initial encounter.     Review of Systems   Eyes:  Positive for photophobia.   Neurological:  Positive for headaches. Negative for speech difficulty and weakness.     Scheduled Meds:   gabapentin  400 mg Oral Q8H    levETIRAcetam  500 mg Oral BID    levothyroxine  25 mcg Oral Before breakfast    methocarbamoL  500 mg Oral QID    mupirocin   Nasal BID    niMODipine  60 mg Oral Q4H    senna-docusate 8.6-50 mg  1 tablet Oral Daily     Continuous Infusions:  PRN Meds:acetaminophen, albuterol-ipratropium, dextrose 10%, dextrose 10%, glucagon (human recombinant), hydrALAZINE, insulin aspart U-100, labetalol, magnesium oxide, magnesium oxide, morphine, oxyCODONE, potassium bicarbonate, potassium bicarbonate, potassium bicarbonate, potassium, sodium phosphates, potassium, sodium phosphates, potassium, sodium phosphates, sodium chloride 0.9%    Objective:     Vital Signs (Most Recent):  Temp: 98.5 °F (36.9 °C) (01/12/24 0726)  Pulse: 89 (01/12/24 1126)  Resp: 19 (01/12/24 1126)  BP: (!) 157/70 (01/12/24 1126)  SpO2: (!) 92 % (01/12/24 1126)  BP Location: Left arm    Vital Signs Range (Last 24H):  Temp:  [37.4 °F (3 °C)-98.6 °F (37 °C)]   Pulse:  [76-95]   Resp:  [15-24]   BP: (121-178)/(47-78)   SpO2:  [87 %-98 %]   Arterial Line BP: (122-184)/(44-63)   BP Location: Left arm       Physical Exam  Vitals reviewed.   HENT:      Head: Normocephalic and atraumatic.   Cardiovascular:      Rate and Rhythm: Normal rate.   Pulmonary:      Effort: Pulmonary effort is normal. No respiratory distress.   Skin:      "General: Skin is warm and dry.   Neurological:      Mental Status: She is alert.              Neurological Exam:   LOC: drowsy  Attention Span: Good   Language: No aphasia  Articulation: No dysarthria  Orientation: Person, Place, Time   Visual Fields: Full  EOM (CN III, IV, VI): Full/intact  Facial Movement (CN VII): Symmetric facial expression    Motor: Arm left  Normal 5/5  Leg left  Normal 5/5  Arm right  Normal 5/5  Leg right Normal 5/5  Sensation: Intact to light touch, temperature and vibration  Tone: Normal tone throughout    Laboratory:  CMP:   Recent Labs   Lab 01/12/24  0127   CALCIUM 8.5*   ALBUMIN 3.0*   PROT 6.1      K 3.5   CO2 18*   *   BUN 12   CREATININE 0.6   ALKPHOS 84   ALT 65*   AST 40   BILITOT 0.3     CBC:   Recent Labs   Lab 01/12/24  0127   WBC 11.76   RBC 4.01   HGB 11.7*   HCT 35.3*      MCV 88   MCH 29.2   MCHC 33.1     Lipid Panel:   Recent Labs   Lab 01/10/24  2247   CHOL 209*   LDLCALC 131.2   HDL 40   TRIG 189*     Coagulation:   Recent Labs   Lab 01/11/24  0552   INR 1.0   APTT 24.0     Platelet Aggregation Study: No results for input(s): "PLTAGG", "PLTAGINTERP", "PLTAGREGLACO", "ADPPLTAGGREG" in the last 168 hours.  Hgb A1C:   Recent Labs   Lab 01/10/24  2247   HGBA1C 5.8*     TSH:   Recent Labs   Lab 01/10/24  2247   TSH <0.010*       Diagnostic Results     Brain Imaging   CT Head 1/12/24  Impression:     Recent coil embolization of the anterior communicating artery.  No new or worsening intracranial hemorrhage.  No hydrocephalus or intracranial mass effect.    CT Head Stealth 1/11/24  Impression:     Stealth protocol CT included for purposes procedural localization.     Small volume subtle subarachnoid and intraventricular hemorrhage.     7 mm anterior communicating artery aneurysm as further discussed above.     Mild atherosclerotic change as above without evidence of high-grade stenosis or large vessel occlusion.     Several thyroid nodules measuring up to 2 " cm.  Dedicated thyroid ultrasound suggested on a nonemergent basis.    Vessel Imaging   IR Angio 1/11/24  Successful coiling of A-comm aneurysm using 4 coils.     CTA Head and Neck 1/11/24  Impression:     Stealth protocol CT included for purposes procedural localization.     Small volume subtle subarachnoid and intraventricular hemorrhage.     7 mm anterior communicating artery aneurysm as further discussed above.     Mild atherosclerotic change as above without evidence of high-grade stenosis or large vessel occlusion.     Several thyroid nodules measuring up to 2 cm.  Dedicated thyroid ultrasound suggested on a nonemergent basis.    Cardiac Imaging   TTE 1/11/24    Left Ventricle: The left ventricle is normal in size. Ventricular mass is normal. Mildly increased wall thickness. There is concentric remodeling. Normal wall motion. There is hyperdynamic systolic function with a visually estimated ejection fraction of 70 - 75%. Ejection fraction by visual approximation is 73%. There is normal diastolic function. LVOT obstruction at rest  with peak gradient of 50 mmHg ( Valsalva was not performed.)    Right Ventricle: Normal right ventricular cavity size. Wall thickness is normal. Right ventricle wall motion  is normal. Systolic function is normal.    Left Atrium: Left atrium is moderately dilated.    Aortic Valve: The aortic valve is a trileaflet valve. There is mild aortic valve sclerosis. There is no significant regurgitation.    Mitral Valve: There is mild mitral annular calcification present. There is normal leaflet mobility. There is systolic anterior motion of the mitral valve. LVOT obstruction at rest with peak gradient of 50 mmHg ( Valsalva was not performed.) There is trace regurgitation.    Tricuspid Valve: There is mild regurgitation.    Pulmonary Artery: The estimated pulmonary artery systolic pressure is 22 mmHg.    IVC/SVC: Normal venous pressure at 3 mmHg.

## 2024-01-12 NOTE — ASSESSMENT & PLAN NOTE
SBP < 140   On cardene, cont until aneurysm secured  01/12: off cardene, new sbp parameter of <180mmHg

## 2024-01-12 NOTE — ASSESSMENT & PLAN NOTE
59 y/o presents to [facility] with thunderclap HA. Per [facility], reportedly no acute intracranial abnormality on CT head, though upon review at bedside, area of questionable hyperdensity with A Comm aneurysm on CTA. LP performed at [facility] with reported xanthochromia, though not documented on paper chart. Further LP results from [facility] as follows CSF color, red; ROSEMARY CSF, bloody; CSF volume, 14; WBC, 31; RBC 51,000; Protein 64.8; Glucose 73; Gram stain without organisms seen.    -Admit to [facility]  -NSGY, VN consulted  -q1h neuro checks, vital checks  -EKG, ECHO, CXR  -A1c, TSH, lipid panel, coag panel  -Daily CBC, CMP, mag, phos  -SBP < 140, prn labetalol and hydralazine  -SCDs, hold DVT chemoppx in acute period   -Keppra 500 bid  -Nimotop 60q4h  -PT/OT  -MRI/MRA w/wo with vessel wall imaging pending  -IR consutled for DSA  No need for EVD as of yet  Probable coiling today, 01/11 01/12: successfully coiled, positive fluid status, daily TCDs, first set reveals mildly elevated velocities not meeting criteria for spasm, on nimotop  6th nerve palsies likely from subarachnoid blood

## 2024-01-12 NOTE — HOSPITAL COURSE
1/12 S/p acomm coil yesterday, TCD pending. Patient with severe refractory headache worse when sitting up, associated light sensitivity.  Repeat CT stable

## 2024-01-12 NOTE — ASSESSMENT & PLAN NOTE
-SBP <140 for unsecure aneurysm, maintain MAP >65  -PRN labetalol/hydralazine  -can liberalize BP if needed to prevent vasospasm

## 2024-01-12 NOTE — PLAN OF CARE
"[facility] Care Plan    POC reviewed with Loreto Goff and family at 1400. Pt verbalized understanding. Questions and concerns addressed. No acute events today. Pt progressing toward goals. Will continue to monitor. See below and flowsheets for full assessment and VS info.     -SBP <180  -CT complete  -straight cath x2, BM x2  -Phos replaced  -PRN morphine given for pain  -PRN oxy/tylenol given for pain      Is this a stroke patient? no    Neuro:  [place] Coma Scale  Best Eye Response: 4-->(E4) spontaneous  Best Motor Response: 6-->(M6) obeys commands  Best Verbal Response: 5-->(V5) oriented  [place] Coma Scale Score: 15  Assessment Qualifiers: patient not sedated/intubated, no eye obstruction present  Pupil PERRLA: yes     24 hr Temp:  [37.4 °F (3 °C)-98.9 °F (37.2 °C)]     CV:   Rhythm: normal sinus rhythm  BP goals:   SBP < 180  MAP > 65    Resp:           Plan: N/A    GI/:     Diet/Nutrition Received: regular  Last Bowel Movement: 01/12/24  Voiding Characteristics: due to void    Intake/Output Summary (Last 24 hours) at 1/12/2024 1821  Last data filed at 1/12/2024 1716  Gross per 24 hour   Intake 4374.63 ml   Output 5000 ml   Net -625.37 ml     Unmeasured Output  Stool Occurrence: 1    Labs/Accuchecks:  Recent Labs   Lab 01/12/24  0127   WBC 11.76   RBC 4.01   HGB 11.7*   HCT 35.3*         Recent Labs   Lab 01/12/24  0127      K 3.5   CO2 18*   *   BUN 12   CREATININE 0.6   ALKPHOS 84   ALT 65*   AST 40   BILITOT 0.3      Recent Labs   Lab 01/11/24  0552   INR 1.0   APTT 24.0    No results for input(s): "CPK", "CPKMB", "TROPONINI", "MB" in the last 168 hours.    Electrolytes: Electrolytes replaced  Accuchecks: Q6H    Gtts:      LDA/Wounds:  Lines/Drains/Airways       Arterial Line  Duration             Arterial Line 01/11/24 1203 Left Radial 1 day              Peripheral Intravenous Line  Duration                  Peripheral IV - Single Lumen 01/12/24 0430 20 G Anterior;Right Forearm <1 day        "  Peripheral IV - Single Lumen 01/12/24 0500 18 G Anterior;Distal;Right Upper Arm <1 day                  Wounds: No  Wound care consulted: No

## 2024-01-12 NOTE — PLAN OF CARE
"[facility] Care Plan    POC reviewed with Loreto Goff and family at 0300. Pt verbalized understanding. Questions and concerns addressed. No acute events overnight. Pt progressing toward goals. Will continue to monitor. See below and flowsheets for full assessment and VS info.  - pt with persistent HA overnight, prn oxy and tylenol utilized, migraine cocktail given, one time dose of decadron given   - HA only relieved by prn morphine   - R groin site soft   - cardene gtt off @ 2245  - prn hydralazine x1   - NS @ 100   - straight cath x2              Is this a stroke patient? no    Neuro:  [place] Coma Scale  Best Eye Response: 4-->(E4) spontaneous  Best Motor Response: 6-->(M6) obeys commands  Best Verbal Response: 5-->(V5) oriented  [place] Coma Scale Score: 15  Assessment Qualifiers: patient not sedated/intubated  Pupil PERRLA: yes     24hr Temp:  [37.4 °F (3 °C)-98.6 °F (37 °C)]     CV:   Rhythm: normal sinus rhythm  BP goals:   SBP < 160  MAP > 65    Resp:           Plan: N/A    GI/:     Diet/Nutrition Received: regular  Last Bowel Movement: 01/11/24  Voiding Characteristics: voids spontaneously without difficulty    Intake/Output Summary (Last 24 hours) at 1/12/2024 0708  Last data filed at 1/12/2024 0601  Gross per 24 hour   Intake 4241.1 ml   Output 3900 ml   Net 341.1 ml     Unmeasured Output  Stool Occurrence: 0    Labs/Accuchecks:  Recent Labs   Lab 01/12/24  0127   WBC 11.76   RBC 4.01   HGB 11.7*   HCT 35.3*         Recent Labs   Lab 01/12/24  0127      K 3.5   CO2 18*   *   BUN 12   CREATININE 0.6   ALKPHOS 84   ALT 65*   AST 40   BILITOT 0.3      Recent Labs   Lab 01/11/24  0552   INR 1.0   APTT 24.0    No results for input(s): "CPK", "CPKMB", "TROPONINI", "MB" in the last 168 hours.    Electrolytes: Electrolytes replaced  Accuchecks: Q6H    Gtts:   sodium chloride 0.9% 100 mL/hr at 01/12/24 0601    nicardipine         LDA/Wounds:  Lines/Drains/Airways       Arterial Line  Duration       "       Arterial Line 01/11/24 1203 Left Radial <1 day              Peripheral Intravenous Line  Duration                  Peripheral IV - Single Lumen 01/10/24 20 G Anterior;Left Forearm 2 days         Peripheral IV - Single Lumen 01/10/24 20 G Anterior;Right Hand 2 days                  Wounds: No  Wound care consulted: No

## 2024-01-12 NOTE — PROGRESS NOTES
[facility] - Neuro Critical Care  Neurocritical Care  Progress Note    Admit Date: 1/10/2024  Service Date: 01/12/2024  Length of Stay: 2    Subjective:     Chief Complaint: Anterior communicating artery aneurysm    History of Present Illness: Ms. Loreto Goff is a 59 y/o F with a PMHx of depression and hypothyroidism who presents to [facility] with thunderclap HA. She initially presented to [facility] on 01/10, but states that her symtoms consisting of neck pain radiating the front of her head, photophobia, and R leg numbness started on 01/05.  No acute intracranial abnormality on CT reported from [facility], though questionable area of hyperdensity upon review of disc at [facility].  A Comm aneurysm on CTA. LP performed at [facility] with reported xanthochromia. Further LP results from [facility] as follows CSF color, red; ROSEMARY CSF, bloody; CSF volume, 14; WBC, 31; RBC 51,000; Protein 64.8; Glucose 73; Gram stain without organisms seen. She will be admitted to [facility] for hourly neuro-monitoring and a higher level of care.       Hospital Course: 01/11: persistent headache, with sats in low 90s, use decadron rather then dilaudid, awaiting angio on cardene for sbp control   01/12: has bilateral 6th nerve palsy today which is more apparent, head CT unchanged, was difficult to examine yesterday due to presence of narcotics and headache    Interval History:      Review of Systems   Constitutional:  Negative for fever.   HENT:  Negative for trouble swallowing.    Eyes:  Positive for photophobia.   Respiratory:  Negative for shortness of breath.    Cardiovascular:  Negative for chest pain.   Gastrointestinal:  Negative for abdominal pain.   Endocrine: Negative.    Genitourinary: Negative.    Musculoskeletal:  Positive for neck pain.   Skin: Negative.    Allergic/Immunologic: Negative.    Neurological:  Positive for headaches.   Hematological: Negative.    Psychiatric/Behavioral: Negative.         Objective:     Vitals:  Temp: 98.5 °F (36.9 °C)  Pulse:  89  Rhythm: normal sinus rhythm  BP: (!) 157/70  MAP (mmHg): 101  Resp: 19  SpO2: (!) 92 %    Temp  Min: 37.4 °F (3 °C)  Max: 98.6 °F (37 °C)  Pulse  Min: 76  Max: 95  BP  Min: 121/58  Max: 178/78  MAP (mmHg)  Min: 73  Max: 112  Resp  Min: 15  Max: 24  SpO2  Min: 87 %  Max: 98 %    01/11 0701 - 01/12 0700  In: 4253.6 [P.O.:600; I.V.:1683.5]  Out: 3900 [Urine:3900]   Unmeasured Output  Stool Occurrence: 1        Physical Exam  HENT:      Head: Normocephalic.   Eyes:      Pupils: Pupils are equal, round, and reactive to light.      Comments: Difficulty with lateral horizontal gaze bilat   Cardiovascular:      Rate and Rhythm: Normal rate.      Pulses: Normal pulses.   Pulmonary:      Breath sounds: Normal breath sounds.   Abdominal:      Palpations: Abdomen is soft.   Musculoskeletal:      Cervical back: Rigidity present.   Skin:     General: Skin is warm.      Capillary Refill: Capillary refill takes less than 2 seconds.   Neurological:      General: No focal deficit present.      Mental Status: She is alert.              Medications:  Continuous Scheduledgabapentin, 400 mg, Q8H  levETIRAcetam, 500 mg, BID  levothyroxine, 25 mcg, Before breakfast  methocarbamoL, 500 mg, QID  mupirocin, , BID  niMODipine, 60 mg, Q4H  senna-docusate 8.6-50 mg, 1 tablet, Daily    PRNacetaminophen, 1,000 mg, Q8H PRN  albuterol-ipratropium, 3 mL, Q6H PRN  dextrose 10%, 12.5 g, PRN  dextrose 10%, 25 g, PRN  glucagon (human recombinant), 1 mg, PRN  hydrALAZINE, 10 mg, Q4H PRN  insulin aspart U-100, 0-10 Units, Q6H PRN  labetalol, 10 mg, Q4H PRN  magnesium oxide, 800 mg, PRN  magnesium oxide, 800 mg, PRN  morphine, 1 mg, Q4H PRN  oxyCODONE, 5 mg, Q6H PRN  potassium bicarbonate, 35 mEq, PRN  potassium bicarbonate, 50 mEq, PRN  potassium bicarbonate, 60 mEq, PRN  potassium, sodium phosphates, 2 packet, PRN  potassium, sodium phosphates, 2 packet, PRN  potassium, sodium phosphates, 2 packet, PRN  sodium chloride 0.9%, 10 mL, PRN      Today I  personally reviewed pertinent medications, lines/drains/airways, imaging, laboratory results, notably:    Diet  Diet Adult Regular (IDDSI Level 7)  Diet Adult Regular (IDDSI Level 7)        Assessment/Plan:     Neuro  * Anterior communicating artery aneurysm  57 y/o presents to [facility] with thunderclap HA. Per [facility], reportedly no acute intracranial abnormality on CT head, though upon review at bedside, area of questionable hyperdensity with A Comm aneurysm on CTA. LP performed at [facility] with reported xanthochromia, though not documented on paper chart. Further LP results from [facility] as follows CSF color, red; ROSEMARY CSF, bloody; CSF volume, 14; WBC, 31; RBC 51,000; Protein 64.8; Glucose 73; Gram stain without organisms seen.    -Admit to [facility]  -NSGY, VN consulted  -q1h neuro checks, vital checks  -EKG, ECHO, CXR  -A1c, TSH, lipid panel, coag panel  -Daily CBC, CMP, mag, phos  -SBP < 140, prn labetalol and hydralazine  -SCDs, hold DVT chemoppx in acute period   -Keppra 500 bid  -Nimotop 60q4h  -PT/OT  -MRI/MRA w/wo with vessel wall imaging pending  -IR consutled for DSA  No need for EVD as of yet  Probable coiling today, 01/11 01/12: successfully coiled, positive fluid status, daily TCDs, first set reveals mildly elevated velocities not meeting criteria for spasm, on nimotop  6th nerve palsies likely from subarachnoid blood    Pulmonary  Mild intermittent asthma without complication  PRN duonebs    Cardiac/Vascular  Essential hypertension  SBP < 140   On cardene, cont until aneurysm secured  01/12: off cardene, new sbp parameter of <180mmHg      Endocrine  Acquired hypothyroidism  Resume home synthroid   Known history or thyroid cyst/goiter          The patient is being Prophylaxed for:  Venous Thromboembolism with: Chemical  Stress Ulcer with: None  Ventilator Pneumonia with: not applicable    Activity Orders            Diet Adult Regular (IDDSI Level 7): Regular starting at 01/11 1950    Turn patient starting at 01/10 2200     Elevate [facility] starting at 01/10 2156        CRC 35 min  Full Code    Jacob Mcdonough MD  Neurocritical Care  [facility] - Neuro Critical Care

## 2024-01-12 NOTE — SUBJECTIVE & OBJECTIVE
Interval History:      Review of Systems   Constitutional:  Negative for fever.   HENT:  Negative for trouble swallowing.    Eyes:  Positive for photophobia.   Respiratory:  Negative for shortness of breath.    Cardiovascular:  Negative for chest pain.   Gastrointestinal:  Negative for abdominal pain.   Endocrine: Negative.    Genitourinary: Negative.    Musculoskeletal:  Positive for neck pain.   Skin: Negative.    Allergic/Immunologic: Negative.    Neurological:  Positive for headaches.   Hematological: Negative.    Psychiatric/Behavioral: Negative.         Objective:     Vitals:  Temp: 98.5 °F (36.9 °C)  Pulse: 89  Rhythm: normal sinus rhythm  BP: (!) 157/70  MAP (mmHg): 101  Resp: 19  SpO2: (!) 92 %    Temp  Min: 37.4 °F (3 °C)  Max: 98.6 °F (37 °C)  Pulse  Min: 76  Max: 95  BP  Min: 121/58  Max: 178/78  MAP (mmHg)  Min: 73  Max: 112  Resp  Min: 15  Max: 24  SpO2  Min: 87 %  Max: 98 %    01/11 0701 - 01/12 0700  In: 4253.6 [P.O.:600; I.V.:1683.5]  Out: 3900 [Urine:3900]   Unmeasured Output  Stool Occurrence: 1        Physical Exam  HENT:      Head: Normocephalic.   Eyes:      Pupils: Pupils are equal, round, and reactive to light.      Comments: Difficulty with lateral horizontal gaze bilat   Cardiovascular:      Rate and Rhythm: Normal rate.      Pulses: Normal pulses.   Pulmonary:      Breath sounds: Normal breath sounds.   Abdominal:      Palpations: Abdomen is soft.   Musculoskeletal:      Cervical back: Rigidity present.   Skin:     General: Skin is warm.      Capillary Refill: Capillary refill takes less than 2 seconds.   Neurological:      General: No focal deficit present.      Mental Status: She is alert.              Medications:  Continuous Scheduledgabapentin, 400 mg, Q8H  levETIRAcetam, 500 mg, BID  levothyroxine, 25 mcg, Before breakfast  methocarbamoL, 500 mg, QID  mupirocin, , BID  niMODipine, 60 mg, Q4H  senna-docusate 8.6-50 mg, 1 tablet, Daily    PRNacetaminophen, 1,000 mg, Q8H  PRN  albuterol-ipratropium, 3 mL, Q6H PRN  dextrose 10%, 12.5 g, PRN  dextrose 10%, 25 g, PRN  glucagon (human recombinant), 1 mg, PRN  hydrALAZINE, 10 mg, Q4H PRN  insulin aspart U-100, 0-10 Units, Q6H PRN  labetalol, 10 mg, Q4H PRN  magnesium oxide, 800 mg, PRN  magnesium oxide, 800 mg, PRN  morphine, 1 mg, Q4H PRN  oxyCODONE, 5 mg, Q6H PRN  potassium bicarbonate, 35 mEq, PRN  potassium bicarbonate, 50 mEq, PRN  potassium bicarbonate, 60 mEq, PRN  potassium, sodium phosphates, 2 packet, PRN  potassium, sodium phosphates, 2 packet, PRN  potassium, sodium phosphates, 2 packet, PRN  sodium chloride 0.9%, 10 mL, PRN      Today I personally reviewed pertinent medications, lines/drains/airways, imaging, laboratory results, notably:    Diet  Diet Adult Regular (IDDSI Level 7)  Diet Adult Regular (IDDSI Level 7)

## 2024-01-12 NOTE — NURSING
Patient brought to CT via bed with a portable monitor and ambu bag present. Patient is back in room 9067 with VSS, bed in lowest position, call light in reach.    PAST MEDICAL HISTORY:  DM (diabetes mellitus)     GI bleeding

## 2024-01-12 NOTE — PT/OT/SLP EVAL
Speech Language Pathology Evaluation  Cognitive/Bedside Swallow    Patient Name:  Loreto Goff   MRN:  [id number]  Admitting Diagnosis: Anterior communicating artery aneurysm    Recommendations:                  General Recommendations:  Cognitive-linguistic therapy  Diet recommendations:  Regular Diet - IDDSI Level 7, Thin liquids - IDDSI Level 0   Aspiration Precautions: Position HOB as upright as pt will tolerate for PO intake (may need encouragement to allow to raise HOB); 1 bite/sip at a time, Alternating bites/sips, Assistance with meals, Eliminate distractions, Feed only when awake/alert, Monitor for s/s of aspiration, Small bites/sips, and Standard aspiration precautions   General Precautions: Standard, aspiration, fall  Communication strategies:  go to room if call light pushed    Assessment:     Loreto Goff is a 58 y.o. female with an SLP diagnosis of Cognitive-Linguistic Impairment.     History:     History of Present Illness: Ms. Loreto Goff is a 57 y/o F with a PMHx of depression and hypothyroidism who presents to [facility] with thunderclap HA. She initially presented to [facility] on 01/10, but states that her symtoms consisting of neck pain radiating the front of her head, photophobia, and R leg numbness started on 01/05.  No acute intracranial abnormality on CT reported from [facility], though questionable area of hyperdensity upon review of disc at [facility].  A Comm aneurysm on CTA. LP performed at [facility] with reported xanthochromia. Further LP results from [facility] as follows CSF color, red; ROSEMARY CSF, bloody; CSF volume, 14; WBC, 31; RBC 51,000; Protein 64.8; Glucose 73; Gram stain without organisms seen. She will be admitted to [facility] for hourly neuro-monitoring and a higher level of care.     History reviewed. No pertinent past medical history.    History reviewed. No pertinent surgical history.    Prior Intubation HX:  1/11 for procedure    Modified Barium Swallow: none on file    Chest X-Rays: none on file    Prior diet:  "regular/thins - passed [place]avarria      Subjective     "Lay me back down! Lay me back down!" Pt unable tolerate HOB elevated due to severe headache.     Pain/Comfort:  Pain Rating 1:  (did not rate headache)  Pain Addressed 1: Reposition, Pre-medicate for activity (nurse in room)    Respiratory Status: Nasal cannula, flow 2 L/min    Objective:     Cognitive Status:    Pt was oriented to place, situation, and month IND. Cues were required for year. Recall of general information was 67% accurate IND/100% given cues.  Following a nearly 2 minute delay, pt recalled 3/3 unrelated words given min cues. Simple problem solving q's were answered with 50% accuracy IND/67% given cues.      Receptive Language:   Comprehension:   Pt answered complex yes/no q's with 40% accuracy IND/60% given cues.  Pt followed 1-step commands, but was unable to follow 2-steps.    Expressive Language:  Verbal:  Pt able to express simple wants/needs, but affects of pain/headache and medication impacting higher level communication.  Single word responsive naming q's were answered correctly. Pt named the days of the week correctly.  Further assessment of confrontational naming, word fluency, and categorization warranted.       Motor Speech:  WFL    Voice:   WFL    Visual-Spatial:  Tba - pt appearing to have sensitivity to light as she kept her eye covered with a towel or sweater most of the time    Reading:   tba      Written Expression:   tba    Bedside Swallow Eval:   Consistencies Assessed:  Thin liquids straw sips water in isolation x 2  Mixed consistencies straw sips water with multiple pills  Solids 1/5 cracker x 1      Oral Phase:   WFL    Pharyngeal Phase:   no overt clinical signs/symptoms of aspiration  no overt clinical signs/symptoms of pharyngeal dysphagia    Treatment: Education was provided to pt regarding role of SLP, purpose of swallowing and speech/language/cognitive evaluation, and SLP treatment plan and POC. Pt was very distracted by " pain/headache, therefore, full understanding and carryover likely decreased.     Goals:   Multidisciplinary Problems       SLP Goals          Problem: SLP    Goal Priority Disciplines Outcome   SLP Goal     SLP    Description: Speech Language Pathology Goals  Goals expected to be met by 1/19:  1. Pt will O x 4.   2. Pt will recall general information with 70% accuracy.   3. Following a delay, pt will recall 3/3 novel items with supervision.   4. Pt will answer complex yes/no q's with 80% accuracy given mod cues.   5. Pt will follow 2-step commands with 70% accuracy given max cues.   6. Pt will answer simple problem solving q's with 70% accuracy given min cues.                                Plan:     Patient to be seen:  4 x/week   Plan of Care expires:  02/11/24  Plan of Care reviewed with:  patient   SLP Follow-Up:  Yes       Discharge recommendations:  Therapy Intensity Recommendations at Discharge: Moderate Intensity Therapy     Time Tracking:     SLP Treatment Date:   01/12/24  Speech Start Time:  1012  Speech Stop Time:  1032     Speech Total Time (min):  20 min    Billable Minutes: Eval 12  and Eval Swallow and Oral Function 8    01/12/2024

## 2024-01-13 PROBLEM — R33.9 URINARY RETENTION: Status: ACTIVE | Noted: 2024-01-13

## 2024-01-13 LAB
ALBUMIN SERPL BCP-MCNC: 3.1 G/DL (ref 3.5–5.2)
ALP SERPL-CCNC: 74 U/L (ref 55–135)
ALT SERPL W/O P-5'-P-CCNC: 45 U/L (ref 10–44)
ANION GAP SERPL CALC-SCNC: 9 MMOL/L (ref 8–16)
AST SERPL-CCNC: 24 U/L (ref 10–40)
BASOPHILS # BLD AUTO: 0.03 K/UL (ref 0–0.2)
BASOPHILS NFR BLD: 0.2 % (ref 0–1.9)
BILIRUB SERPL-MCNC: 0.3 MG/DL (ref 0.1–1)
BUN SERPL-MCNC: 10 MG/DL (ref 6–20)
CALCIUM SERPL-MCNC: 8.4 MG/DL (ref 8.7–10.5)
CHLORIDE SERPL-SCNC: 107 MMOL/L (ref 95–110)
CO2 SERPL-SCNC: 25 MMOL/L (ref 23–29)
CREAT SERPL-MCNC: 0.6 MG/DL (ref 0.5–1.4)
DIFFERENTIAL METHOD BLD: ABNORMAL
EOSINOPHIL # BLD AUTO: 0 K/UL (ref 0–0.5)
EOSINOPHIL NFR BLD: 0.1 % (ref 0–8)
ERYTHROCYTE [DISTWIDTH] IN BLOOD BY AUTOMATED COUNT: 14 % (ref 11.5–14.5)
EST. GFR  (NO RACE VARIABLE): >60 ML/MIN/1.73 M^2
GLUCOSE SERPL-MCNC: 108 MG/DL (ref 70–110)
HCT VFR BLD AUTO: 35.2 % (ref 37–48.5)
HGB BLD-MCNC: 11.2 G/DL (ref 12–16)
IMM GRANULOCYTES # BLD AUTO: 0.07 K/UL (ref 0–0.04)
IMM GRANULOCYTES NFR BLD AUTO: 0.4 % (ref 0–0.5)
LYMPHOCYTES # BLD AUTO: 3.5 K/UL (ref 1–4.8)
LYMPHOCYTES NFR BLD: 20.6 % (ref 18–48)
MAGNESIUM SERPL-MCNC: 1.8 MG/DL (ref 1.6–2.6)
MCH RBC QN AUTO: 28.5 PG (ref 27–31)
MCHC RBC AUTO-ENTMCNC: 31.8 G/DL (ref 32–36)
MCV RBC AUTO: 90 FL (ref 82–98)
MONOCYTES # BLD AUTO: 1.4 K/UL (ref 0.3–1)
MONOCYTES NFR BLD: 8.4 % (ref 4–15)
NEUTROPHILS # BLD AUTO: 11.9 K/UL (ref 1.8–7.7)
NEUTROPHILS NFR BLD: 70.3 % (ref 38–73)
NRBC BLD-RTO: 0 /100 WBC
PHOSPHATE SERPL-MCNC: 2.3 MG/DL (ref 2.7–4.5)
PLATELET # BLD AUTO: 248 K/UL (ref 150–450)
PMV BLD AUTO: 10.4 FL (ref 9.2–12.9)
POCT GLUCOSE: 109 MG/DL (ref 70–110)
POCT GLUCOSE: 113 MG/DL (ref 70–110)
POTASSIUM SERPL-SCNC: 3.2 MMOL/L (ref 3.5–5.1)
PROT SERPL-MCNC: 6.1 G/DL (ref 6–8.4)
RBC # BLD AUTO: 3.93 M/UL (ref 4–5.4)
SODIUM SERPL-SCNC: 141 MMOL/L (ref 136–145)
WBC # BLD AUTO: 16.95 K/UL (ref 3.9–12.7)

## 2024-01-13 PROCEDURE — 25000003 PHARM REV CODE 250

## 2024-01-13 PROCEDURE — 27000221 HC OXYGEN, UP TO 24 HOURS

## 2024-01-13 PROCEDURE — 94761 N-INVAS EAR/PLS OXIMETRY MLT: CPT

## 2024-01-13 PROCEDURE — 25000003 PHARM REV CODE 250: Performed by: PSYCHIATRY & NEUROLOGY

## 2024-01-13 PROCEDURE — 85025 COMPLETE CBC W/AUTO DIFF WBC: CPT

## 2024-01-13 PROCEDURE — 83735 ASSAY OF MAGNESIUM: CPT

## 2024-01-13 PROCEDURE — 63600175 PHARM REV CODE 636 W HCPCS

## 2024-01-13 PROCEDURE — 99291 CRITICAL CARE FIRST HOUR: CPT | Mod: ,,,

## 2024-01-13 PROCEDURE — 80053 COMPREHEN METABOLIC PANEL: CPT

## 2024-01-13 PROCEDURE — 97162 PT EVAL MOD COMPLEX 30 MIN: CPT

## 2024-01-13 PROCEDURE — 97166 OT EVAL MOD COMPLEX 45 MIN: CPT

## 2024-01-13 PROCEDURE — 97110 THERAPEUTIC EXERCISES: CPT

## 2024-01-13 PROCEDURE — 51701 INSERT BLADDER CATHETER: CPT

## 2024-01-13 PROCEDURE — 84100 ASSAY OF PHOSPHORUS: CPT

## 2024-01-13 PROCEDURE — 63600175 PHARM REV CODE 636 W HCPCS: Mod: JZ,JG | Performed by: PSYCHIATRY & NEUROLOGY

## 2024-01-13 PROCEDURE — 99499 UNLISTED E&M SERVICE: CPT | Mod: ,,, | Performed by: PSYCHIATRY & NEUROLOGY

## 2024-01-13 PROCEDURE — 20000000 HC ICU ROOM

## 2024-01-13 PROCEDURE — 51798 US URINE CAPACITY MEASURE: CPT

## 2024-01-13 PROCEDURE — 97530 THERAPEUTIC ACTIVITIES: CPT

## 2024-01-13 RX ORDER — SODIUM CHLORIDE 9 MG/ML
INJECTION, SOLUTION INTRAVENOUS CONTINUOUS
Status: ACTIVE | OUTPATIENT
Start: 2024-01-13 | End: 2024-01-14

## 2024-01-13 RX ORDER — LIDOCAINE 50 MG/G
1 PATCH TOPICAL
Status: DISCONTINUED | OUTPATIENT
Start: 2024-01-13 | End: 2024-01-24 | Stop reason: HOSPADM

## 2024-01-13 RX ORDER — MORPHINE SULFATE 2 MG/ML
2 INJECTION, SOLUTION INTRAMUSCULAR; INTRAVENOUS
Status: DISCONTINUED | OUTPATIENT
Start: 2024-01-13 | End: 2024-01-17

## 2024-01-13 RX ORDER — PREGABALIN 75 MG/1
150 CAPSULE ORAL 2 TIMES DAILY
Status: DISCONTINUED | OUTPATIENT
Start: 2024-01-13 | End: 2024-01-14

## 2024-01-13 RX ORDER — MORPHINE SULFATE 2 MG/ML
1 INJECTION, SOLUTION INTRAMUSCULAR; INTRAVENOUS ONCE
Status: COMPLETED | OUTPATIENT
Start: 2024-01-13 | End: 2024-01-13

## 2024-01-13 RX ORDER — SODIUM CHLORIDE 9 MG/ML
INJECTION, SOLUTION INTRAVENOUS CONTINUOUS
Status: DISCONTINUED | OUTPATIENT
Start: 2024-01-13 | End: 2024-01-13

## 2024-01-13 RX ADMIN — METHOCARBAMOL 500 MG: 500 TABLET ORAL at 09:01

## 2024-01-13 RX ADMIN — ACETAMINOPHEN 1000 MG: 500 TABLET ORAL at 02:01

## 2024-01-13 RX ADMIN — ACETAMINOPHEN 1000 MG: 500 TABLET ORAL at 08:01

## 2024-01-13 RX ADMIN — PREGABALIN 150 MG: 75 CAPSULE ORAL at 09:01

## 2024-01-13 RX ADMIN — ENOXAPARIN SODIUM 40 MG: 100 INJECTION SUBCUTANEOUS at 05:01

## 2024-01-13 RX ADMIN — MUPIROCIN: 20 OINTMENT TOPICAL at 09:01

## 2024-01-13 RX ADMIN — METHOCARBAMOL 500 MG: 500 TABLET ORAL at 08:01

## 2024-01-13 RX ADMIN — Medication 800 MG: at 10:01

## 2024-01-13 RX ADMIN — GABAPENTIN 400 MG: 400 CAPSULE ORAL at 06:01

## 2024-01-13 RX ADMIN — OXYCODONE HYDROCHLORIDE 5 MG: 5 TABLET ORAL at 05:01

## 2024-01-13 RX ADMIN — SODIUM CHLORIDE: 9 INJECTION, SOLUTION INTRAVENOUS at 10:01

## 2024-01-13 RX ADMIN — SODIUM CHLORIDE 1000 ML: 0.9 INJECTION, SOLUTION INTRAVENOUS at 10:01

## 2024-01-13 RX ADMIN — MORPHINE SULFATE 2 MG: 2 INJECTION, SOLUTION INTRAMUSCULAR; INTRAVENOUS at 04:01

## 2024-01-13 RX ADMIN — LEVETIRACETAM 500 MG: 500 TABLET, FILM COATED ORAL at 09:01

## 2024-01-13 RX ADMIN — NIMODIPINE 60 MG: 30 CAPSULE, LIQUID FILLED ORAL at 10:01

## 2024-01-13 RX ADMIN — NIMODIPINE 60 MG: 30 CAPSULE, LIQUID FILLED ORAL at 06:01

## 2024-01-13 RX ADMIN — POTASSIUM & SODIUM PHOSPHATES POWDER PACK 280-160-250 MG 2 PACKET: 280-160-250 PACK at 10:01

## 2024-01-13 RX ADMIN — MORPHINE SULFATE 2 MG: 2 INJECTION, SOLUTION INTRAMUSCULAR; INTRAVENOUS at 10:01

## 2024-01-13 RX ADMIN — LABETALOL HYDROCHLORIDE 10 MG: 5 INJECTION, SOLUTION INTRAVENOUS at 02:01

## 2024-01-13 RX ADMIN — METHOCARBAMOL 500 MG: 500 TABLET ORAL at 05:01

## 2024-01-13 RX ADMIN — OXYCODONE HYDROCHLORIDE 5 MG: 5 TABLET ORAL at 02:01

## 2024-01-13 RX ADMIN — NIMODIPINE 60 MG: 30 CAPSULE, LIQUID FILLED ORAL at 01:01

## 2024-01-13 RX ADMIN — MORPHINE SULFATE 1 MG: 2 INJECTION, SOLUTION INTRAMUSCULAR; INTRAVENOUS at 02:01

## 2024-01-13 RX ADMIN — MORPHINE SULFATE 2 MG: 2 INJECTION, SOLUTION INTRAMUSCULAR; INTRAVENOUS at 06:01

## 2024-01-13 RX ADMIN — MORPHINE SULFATE 2 MG: 2 INJECTION, SOLUTION INTRAMUSCULAR; INTRAVENOUS at 08:01

## 2024-01-13 RX ADMIN — MORPHINE SULFATE 2 MG: 2 INJECTION, SOLUTION INTRAMUSCULAR; INTRAVENOUS at 02:01

## 2024-01-13 RX ADMIN — POTASSIUM & SODIUM PHOSPHATES POWDER PACK 280-160-250 MG 2 PACKET: 280-160-250 PACK at 06:01

## 2024-01-13 RX ADMIN — NIMODIPINE 60 MG: 30 CAPSULE, LIQUID FILLED ORAL at 05:01

## 2024-01-13 RX ADMIN — LIDOCAINE 1 PATCH: 50 PATCH TOPICAL at 01:01

## 2024-01-13 RX ADMIN — POTASSIUM BICARBONATE 35 MEQ: 391 TABLET, EFFERVESCENT ORAL at 06:01

## 2024-01-13 RX ADMIN — Medication 800 MG: at 06:01

## 2024-01-13 RX ADMIN — LEVOTHYROXINE SODIUM 25 MCG: 25 TABLET ORAL at 06:01

## 2024-01-13 RX ADMIN — METHOCARBAMOL 500 MG: 500 TABLET ORAL at 01:01

## 2024-01-13 RX ADMIN — SODIUM CHLORIDE: 9 INJECTION, SOLUTION INTRAVENOUS at 05:01

## 2024-01-13 RX ADMIN — OXYCODONE HYDROCHLORIDE 5 MG: 5 TABLET ORAL at 08:01

## 2024-01-13 RX ADMIN — MORPHINE SULFATE 1 MG: 2 INJECTION, SOLUTION INTRAMUSCULAR; INTRAVENOUS at 01:01

## 2024-01-13 RX ADMIN — OXYCODONE HYDROCHLORIDE 5 MG: 5 TABLET ORAL at 11:01

## 2024-01-13 RX ADMIN — PREGABALIN 150 MG: 75 CAPSULE ORAL at 10:01

## 2024-01-13 RX ADMIN — LEVETIRACETAM 500 MG: 500 TABLET, FILM COATED ORAL at 08:01

## 2024-01-13 RX ADMIN — POTASSIUM BICARBONATE 35 MEQ: 391 TABLET, EFFERVESCENT ORAL at 10:01

## 2024-01-13 RX ADMIN — SILODOSIN 4 MG: 4 CAPSULE ORAL at 08:01

## 2024-01-13 NOTE — ASSESSMENT & PLAN NOTE
57 y/o presents to [facility] with thunderclap HA. Per [facility], reportedly no acute intracranial abnormality on CT head, though upon review at bedside, area of questionable hyperdensity with A Comm aneurysm on CTA. LP performed at [facility] with reported xanthochromia, though not documented on paper chart. Further LP results from [facility] as follows CSF color, red; ROSEMARY CSF, bloody; CSF volume, 14; WBC, 31; RBC 51,000; Protein 64.8; Glucose 73; Gram stain without organisms seen.    -Admit to [facility]  -NSGY, VN consulted  -q1h neuro checks, vital checks  -EKG, ECHO, CXR  -A1c, TSH, lipid panel, coag panel  -Daily CBC, CMP, mag, phos  -SBP < 140, prn labetalol and hydralazine  -SCDs, hold DVT chemoppx in acute period   -Keppra 500 bid  -Nimotop 60q4h  -PT/OT  -MRI/MRA w/wo with vessel wall imaging pending  -IR consutled for DSA  - No need for EVD as of yet  - s/p coiling on 01/11  - 1/12 TCDs w mildly elevated velocities not meeting criteria for spasm, on nimotop  - 1/13 CTH w maturing SAH and IVH, overall stable  - Gabapentin changed to lyrica 150 BID. Consider increasing to 300 for persistent HA  - SCDs; lovenox

## 2024-01-13 NOTE — PLAN OF CARE
"[facility] Care Plan    POC reviewed with Loreto Goff and family at 0300. Pt verbalized understanding. Questions and concerns addressed. No acute events overnight. Pt progressing toward goals. Will continue to monitor. See below and flowsheets for full assessment and VS info.   - CT obtained overnight   - pt with severe persistent HA, pt reports unrelieved by oxy/ tylenol/ morphine   - oxy given x2 tylenol given x2 ,morphine given x3  - lidocaine patch applied to R upper glute  - sbp less than 180  - straight cath x1             Is this a stroke patient? yes- Stroke booklet reviewed with patient, risk factors identified for patient and stroke booklet remains at bedside for ongoing education.     Neuro:  [place] Coma Scale  Best Eye Response: 4-->(E4) spontaneous  Best Motor Response: 6-->(M6) obeys commands  Best Verbal Response: 5-->(V5) oriented  Fili Coma Scale Score: 15  Assessment Qualifiers: patient not sedated/intubated  Pupil PERRLA: yes     24hr Temp:  [98.5 °F (36.9 °C)-98.9 °F (37.2 °C)]     CV:   Rhythm: normal sinus rhythm  BP goals:   SBP < 180  MAP > 65    Resp:           Plan: N/A    GI/:     Diet/Nutrition Received: regular  Last Bowel Movement: 01/12/24  Voiding Characteristics: voids spontaneously without difficulty    Intake/Output Summary (Last 24 hours) at 1/13/2024 0530  Last data filed at 1/13/2024 0301  Gross per 24 hour   Intake 2910 ml   Output 3790 ml   Net -880 ml     Unmeasured Output  Urine Occurrence: 1  Stool Occurrence: 1  Pad Count: 3    Labs/Accuchecks:  Recent Labs   Lab 01/13/24  0301   WBC 16.95*   RBC 3.93*   HGB 11.2*   HCT 35.2*         Recent Labs   Lab 01/13/24  0301      K 3.2*   CO2 25      BUN 10   CREATININE 0.6   ALKPHOS 74   ALT 45*   AST 24   BILITOT 0.3      Recent Labs   Lab 01/11/24  0552   INR 1.0   APTT 24.0    No results for input(s): "CPK", "CPKMB", "TROPONINI", "MB" in the last 168 hours.    Electrolytes: Electrolytes replaced  Accuchecks: " Q6H    Gtts:      LDA/Wounds:  Lines/Drains/Airways       Arterial Line  Duration             Arterial Line 01/11/24 1203 Left Radial 1 day              Peripheral Intravenous Line  Duration                  Peripheral IV - Single Lumen 01/12/24 0430 20 G Anterior;Right Forearm 1 day         Peripheral IV - Single Lumen 01/12/24 0500 18 G Anterior;Distal;Right Upper Arm 1 day                  Wounds: No  Wound care consulted: No

## 2024-01-13 NOTE — PLAN OF CARE
"[facility] Care Plan    POC reviewed with Loreto Goff and family at 1400. Pt verbalized understanding. Questions and concerns addressed. No acute events today. Pt progressing toward goals. Will continue to monitor. See below and flowsheets for full assessment and VS info.     - Head and R hip/back pain continued despite PRNs and scheduled medications  - PRN morphine given x2  - PRN oxycodone given x2  - PRN acetaminophen given x1  - PRN labetalol given x1  - 1L NS bolus given x1 for goal of euvolemia  - 250mL/hr NS for goal of euvolemia  - Straight cath required x3      Is this a stroke patient? no    Neuro:  [place] Coma Scale  Best Eye Response: 4-->(E4) spontaneous  Best Motor Response: 6-->(M6) obeys commands  Best Verbal Response: 5-->(V5) oriented  Fili Coma Scale Score: 15  Assessment Qualifiers: no eye obstruction present, patient not sedated/intubated  Pupil PERRLA: yes     24 hr Temp:  [98.4 °F (36.9 °C)-100.4 °F (38 °C)]     CV:   Rhythm: normal sinus rhythm  BP goals:   SBP < 180  MAP > 65    Resp:           Plan: N/A    GI/:     Diet/Nutrition Received: regular  Last Bowel Movement: 01/12/24  Voiding Characteristics: voids spontaneously without difficulty (intermittent urinary retention)    Intake/Output Summary (Last 24 hours) at 1/13/2024 1658  Last data filed at 1/13/2024 1301  Gross per 24 hour   Intake 3619 ml   Output 4665 ml   Net -1046 ml     Unmeasured Output  Urine Occurrence: 1  Stool Occurrence: 1  Pad Count: 3    Labs/Accuchecks:  Recent Labs   Lab 01/13/24  0301   WBC 16.95*   RBC 3.93*   HGB 11.2*   HCT 35.2*         Recent Labs   Lab 01/13/24  0301      K 3.2*   CO2 25      BUN 10   CREATININE 0.6   ALKPHOS 74   ALT 45*   AST 24   BILITOT 0.3      Recent Labs   Lab 01/11/24  0552   INR 1.0   APTT 24.0    No results for input(s): "CPK", "CPKMB", "TROPONINI", "MB" in the last 168 hours.    Electrolytes: Electrolytes replaced  Accuchecks: " Q6H    Gtts:      LDA/Wounds:  Lines/Drains/Airways       Arterial Line  Duration             Arterial Line 01/11/24 1203 Left Radial 2 days              Peripheral Intravenous Line  Duration                  Peripheral IV - Single Lumen 01/12/24 0430 20 G Anterior;Right Forearm 1 day         Peripheral IV - Single Lumen 01/12/24 0500 18 G Anterior;Distal;Right Upper Arm 1 day                  Wounds: No  Wound care consulted: No

## 2024-01-13 NOTE — HOSPITAL COURSE
1/13: NAEON. PBD 8. Negative 300cc this AM however some urine lost when measuring from bed pan per RN. Patient drinking . -180 overnight. Continues to have headache despite Tylenol, PRN narcotics, migraine cocktails. CTH this AM with minimal SAH, stable ventricular caliber. TCDs yesterday w/o spasm.   1/15 naeon, exam stable. Conitnue SAH protocol  1/16 naeon, worsening HA this morning but neuro exam is stable. Continue ICU care, plan for MRA w contrast with vessel wall imaging later this week  1/17 NAEON, pt slightly uncooperative this morning. TCDs 2.73/2.96.   1/18 NAEON, pt exam stable. TCDs 2.65/3.17   1/19 naeon, exam stable, MRA w contrast obtained overnight. Will review imaging with staff. Continue course and SAH protocol today  1/21: NAEON. Pt doing well this morning. Possible TIA episode yesterday with dysarthria and word finding difficulty but pt is back to baseline.   1/22: NAEO. PBD 17. Patient still complains of persistent HA but is intact on exam. Analgesic regimen altered, will CTM. Remains on SAH protocol.   1/23: NAEO. PBD 18. Reports headache is improved this AM. Fioricet added this AM. Exam stable.

## 2024-01-13 NOTE — PLAN OF CARE
POC established and functional mobility goals were created to help pt return to PLOF. Will be reassessed as appropriate to measure pt progress.    Problem: Physical Therapy  Goal: Physical Therapy Goal  Description: Goals to be met by: 24     Patient will increase functional independence with mobility by performin. Supine to sit with Modified Giles  2. Sit to supine with Modified Giles  3. Sit to stand transfer with Modified Giles  4. Bed to chair transfer with Stand-by Assistance using LRAD as needed  5. Gait  x 150 feet with Stand-by Assistance using LRAD as needed.   6. Ascend/descend 2 stair with no Handrails and Stand-by Assistance  7. Lower extremity exercise program x15 reps per handout, with assistance as needed    2024 1440 by Agata Mckeon, PT  Outcome: Ongoing, Progressing

## 2024-01-13 NOTE — PROGRESS NOTES
[facility]  Neurosurgery  Progress Note    Subjective:     History of Present Illness: 59 yo F with PMH of hypothyroidism who presents as transfer from [facility] due to thunderclap headache that started suddenly on Friday, 6 days ago. She reports her headache has worsened more and more over the last week so she came to the hospital. She denies any illicit drug use or blood thinners. She reports her headache is a 10 out of 10. 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 Per report there was xanthochromia on LP performed at OSH on 11/10/24. NSGY consulted for aneurysm.     Post-Op Info:  * No surgery found *       Interval History: NAEON. PBD 8. Negative 300cc this AM however some urine lost when measuring from bed pan per RN. Patient drinking . -180 overnight. Continues to have headache despite Tylenol, PRN narcotics, migraine cocktails. CTH this AM with minimal SAH, stable ventricular caliber. TCDs yesterday w/o spasm.     Medications:  Continuous Infusions:  Scheduled Meds:   enoxparin  40 mg Subcutaneous Q24H (prophylaxis, 1700)    gabapentin  400 mg Oral Q8H    levETIRAcetam  500 mg Oral BID    levothyroxine  25 mcg Oral Before breakfast    LIDOcaine  1 patch Transdermal Q24H    methocarbamoL  500 mg Oral QID    mupirocin   Nasal BID    niMODipine  60 mg Oral Q4H    senna-docusate 8.6-50 mg  1 tablet Oral Daily    silodosin  4 mg Oral Daily     PRN Meds:acetaminophen, dextrose 10%, dextrose 10%, glucagon (human recombinant), hydrALAZINE, insulin aspart U-100, labetalol, magnesium oxide, magnesium oxide, morphine, oxyCODONE, potassium bicarbonate, potassium bicarbonate, potassium bicarbonate, potassium, sodium phosphates, potassium, sodium phosphates, potassium, sodium phosphates     Review of Systems  Objective:     Weight: 97.1 kg (214 lb)  Body mass index is 33.52 kg/m².  Vital Signs (Most Recent):  Temp: 98.8 °F (37.1 °C) (01/13/24  "0301)  Pulse: 76 (01/13/24 0401)  Resp: 15 (01/13/24 0606)  BP: (!) 165/71 (01/13/24 0605)  SpO2: 99 % (01/13/24 0401) Vital Signs (24h Range):  Temp:  [98.5 °F (36.9 °C)-98.9 °F (37.2 °C)] 98.8 °F (37.1 °C)  Pulse:  [76-95] 76  Resp:  [14-27] 15  SpO2:  [92 %-100 %] 99 %  BP: (150-180)/(66-91) 165/71  Arterial Line BP: (129-200)/(51-82) 161/60                                 Physical Exam         Neurosurgery Physical Exam  Constitutional:       Appearance: She is well-developed and well-nourished.   Eyes:      Extraocular Movements: EOM normal.      Conjunctiva/sclera: Conjunctivae normal.      Pupils: Pupils are equal, round, and reactive to light.   Cardiovascular:      Pulses: Normal pulses.   Abdominal:      Palpations: Abdomen is soft.   Neurological:      Mental Status: She is alert and oriented to person, place, and time.      Comments: E4V5M6  AAOx3  PERRL  Cranial nerves intact  Vision grossly normal  Patient follows commands all extremities; slightly uncooperative state  At least 4/5 strength in all extremities; non focal on exam\  SILT  DTRs normal     Significant Labs:  Recent Labs   Lab 01/12/24  0127 01/13/24  0301   * 108    141   K 3.5 3.2*   * 107   CO2 18* 25   BUN 12 10   CREATININE 0.6 0.6   CALCIUM 8.5* 8.4*   MG 2.4 1.8     Recent Labs   Lab 01/12/24  0127 01/13/24  0301   WBC 11.76 16.95*   HGB 11.7* 11.2*   HCT 35.3* 35.2*    248     No results for input(s): "LABPT", "INR", "APTT" in the last 48 hours.  Microbiology Results (last 7 days)       ** No results found for the last 168 hours. **          All pertinent labs from the last 24 hours have been reviewed.    Significant Diagnostics:  I have reviewed and interpreted all pertinent imaging results/findings within the past 24 hours.    CTH as above  Assessment/Plan:     * Anterior communicating artery aneurysm  57 yo F with PMH of hypothyroidism who presents as transfer from [place] due to thunderclap " headache that started suddenly on Friday, 6 days ago. She reports her headache has worsened more and more over the last week so she came to the hospital. She denies any illicit drug use or blood thinners. She reports her headache is a 10 out of 10.     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   Per report there was xanthochromia on LP performed at OSH on 11/10/24.     Presumably post bleed day 8  Coil embolization of Acomm 1/11    - admitted to [facility], q1h neuro checks  - SAH protocol // vasospasm watch   - keppra for seizure ppx   - Na > 135   - nimotop x21 days   - TCDs x14 days   - SBP < 180 today  - on room air, ctm  - strict I/O's; keep euvolemic to 1L net positive  - minimize narcotics for HA control  - hold any blood thinners  - rest of care per N[facility]; nsgy will continue to follow        Majo Yu MD  Neurosurgery  [facility]

## 2024-01-13 NOTE — ASSESSMENT & PLAN NOTE
57 yo F with PMH of hypothyroidism who presents as transfer from [facility] due to thunderclap headache that started suddenly on Friday, 6 days ago. She reports her headache has worsened more and more over the last week so she came to the hospital. She denies any illicit drug use or blood thinners. She reports her headache is a 10 out of 10.     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   Per report there was xanthochromia on LP performed at OSH on 11/10/24.     Presumably post bleed day 8  Coil embolization of Acomm 1/11    - admitted to [facility], q1h neuro checks  - SAH protocol // vasospasm watch   - keppra for seizure ppx   - Na > 135   - nimotop x21 days   - TCDs x14 days   - SBP < 180 today  - on room air, ctm  - strict I/O's; keep euvolemic to 1L net positive  - minimize narcotics for HA control  - hold any blood thinners  - rest of care per N[facility]; nsgy will continue to follow

## 2024-01-13 NOTE — PT/OT/SLP EVAL
"Occupational Therapy  Co- Evaluation & Co-Treatment    Name: Loreto Goff  MRN: [id number]  Admitting Diagnosis: Anterior communicating artery aneurysm  Recent Surgery: * No surgery found *      Recommendations:     Discharge Recommendations: High Intensity Therapy  Discharge Equipment Recommendations:   (still assessing DME needs)  Barriers to discharge:  None    Assessment:     Loreto Goff is a 58 y.o. female with a medical diagnosis of Anterior communicating artery aneurysm.  She presents limited by extreme headache pain during session and with light sensitivity. Patient barely opening eyes but unable to assess vision formally. Performance deficits affecting function: impaired endurance, impaired self care skills, impaired functional mobility, decreased lower extremity function, decreased upper extremity function, decreased coordination, impaired cognition, pain, decreased safety awareness, impaired cardiopulmonary response to activity.  Patient presents with good participation and motivation to return to prior level of function with high intensity therapy.  The patient demonstrates appropriate strength to participate in up to 3 hours or 15hrs of combined therapy post acute.    Rehab Prognosis: Good; patient would benefit from acute skilled OT services to address these deficits and reach maximum level of function.       Plan:     Patient to be seen 4 x/week to address the above listed problems via self-care/home management, therapeutic activities, therapeutic exercises, neuromuscular re-education  Plan of Care Expires: 02/13/24  Plan of Care Reviewed with: patient    Subjective     Chief Complaint: headache   Patient/Family Comments/goals: "1,000/10" headache pain    Occupational Profile:  Living Environment: patient lives with  in [facility] with 2 NEETU with no handrail. 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    Communicated with: nursing prior to session.  Patient found supine with arterial line, blood pressure cuff, pulse ox (continuous), telemetry, oxygen, peripheral IV upon OT entry to room.    General Precautions: Standard, fall, aspiration  Orthopedic Precautions: N/A  Braces: N/A  Respiratory Status: Nasal cannula, flow 2 L/min    Occupational Performance:    Bed Mobility:    Patient completed Supine to Sit with maximal assistance and 2 persons  Patient completed Sit to Supine with maximal assistance and 2 persons  Patient sat EOB for ~ 5 minutes with  initially maximal assistance progressing to stand by assistance after engaging core muscles and reducing posterior pushing     Cognitive/Visual Perceptual:  Cognitive/Psychosocial Skills:     -       Oriented to: Person, Place, Time, and Situation   -       Follows Commands/attention:Follows one-step commands  -       Communication: clear/fluent  -       Memory: No Deficits noted  -       Safety awareness/insight to disability: impaired   -       Mood/Affect/Coping skills/emotional control: Agitated    Physical Exam: unable to formally assess secondary to decreased participation secondary to pain    AMPA 6 Click ADL:  AMPAC Total Score: 15    Treatment & Education:  Therapeutic exercise facilitated by edge of bed activities, postural control, and participation in functional  EOB sitting to improve activity tolerance. Patient demonstrating poor cardiopulmonary tolerance per a-line reading with elevated systolic BP.    Patient educated on:   -need to slowly engage in raising HOB for functional benefits   -purpose of OT and OT POC  -facilitation and education on proper body mechanics, energy conservation, and safety  -importance of early mobility and out of bed activities with staff assist  -overall benefits of therapy     All questions answered within OT scope and to patient's satisfaction    Patient left supine with all lines intact, call button in reach, and nursing notified    GOALS:   Multidisciplinary Problems       Occupational Therapy Goals          Problem: Occupational Therapy    Goal Priority Disciplines Outcome Interventions   Occupational Therapy Goal     OT, PT/OT Ongoing, Progressing    Description: Goals to be met by: 2/13/24     Patient will increase functional independence with ADLs by performing:    UE Dressing with Supervision.  LE Dressing with Contact Guard Assistance.  Grooming while seated with Stand-by Assistance.  Supine to sit with Minimal Assistance.                         History:     History reviewed. No pertinent past medical history.    History reviewed. No pertinent surgical history.    Time Tracking:     OT Date of Treatment: 01/13/24  OT Start Time: 1338  OT Stop Time: 1400  OT Total Time (min): 22 min    Billable Minutes:Evaluation 10  Therapeutic Exercise 12    1/13/2024

## 2024-01-13 NOTE — PROGRESS NOTES
[facility] - Neuro Critical Care  Neurocritical Care  Progress Note    Admit Date: 1/10/2024  Service Date: 01/13/2024  Length of Stay: 3    Subjective:     Chief Complaint: Anterior communicating artery aneurysm    History of Present Illness: Ms. Loreto Goff is a 57 y/o F with a PMHx of depression and hypothyroidism who presents to [facility] with thunderclap HA. She initially presented to [email or web address] on 01/10, but states that her symtoms consisting of neck pain radiating the front of her head, photophobia, and R leg numbness started on 01/05.  No acute intracranial abnormality on CT reported from [facility], though questionable area of hyperdensity upon review of disc at [facility].  A Comm aneurysm on CTA. LP performed at [facility] with reported xanthochromia. Further LP results from [facility] as follows CSF color, red; ROSEMARY CSF, bloody; CSF volume, 14; WBC, 31; RBC 51,000; Protein 64.8; Glucose 73; Gram stain without organisms seen. She will be admitted to [facility] for hourly neuro-monitoring and a higher level of care.       Hospital Course: 01/11: persistent headache, with sats in low 90s, use decadron rather then dilaudid, awaiting angio on cardene for sbp control   01/12: has bilateral 6th nerve palsy today which is more apparent, head CT unchanged, was difficult to examine yesterday due to presence of narcotics and headache  01/13/2024 Started on silodosin overnight for urinary retention requiring I&O cath. Worsening HA w transient L sided numbness overnight. CTH stable. TCDs pending. Persistent 10/10 HA. Gabapentin changed to lyrica 150 BID. 500NS bolus given for euvolemia.     Interval History:      Review of Systems   Constitutional:  Negative for fever.   HENT:  Negative for trouble swallowing.    Eyes:  Positive for photophobia.   Respiratory:  Negative for shortness of breath.    Cardiovascular:  Negative for chest pain.   Gastrointestinal:  Negative for abdominal pain.   Endocrine: Negative.    Genitourinary: Negative.     Musculoskeletal:  Positive for neck pain.   Skin: Negative.    Allergic/Immunologic: Negative.    Neurological:  Positive for headaches.   Hematological: Negative.    Psychiatric/Behavioral: Negative.         Objective:     Vitals:  Temp: 98.4 °F (36.9 °C)  Pulse: 76  Rhythm: normal sinus rhythm  BP: (!) 180/81  MAP (mmHg): 117  Resp: 20  SpO2: 100 %    Temp  Min: 98.4 °F (36.9 °C)  Max: 98.9 °F (37.2 °C)  Pulse  Min: 71  Max: 92  BP  Min: 133/60  Max: 180/81  MAP (mmHg)  Min: 87  Max: 131  Resp  Min: 14  Max: 27  SpO2  Min: 92 %  Max: 100 %    01/12 0701 - 01/13 0700  In: 4095 [P.O.:3995; I.V.:100]  Out: 3790 [Urine:3790]   Unmeasured Output  Urine Occurrence: 1  Stool Occurrence: 1  Pad Count: 3        Physical Exam  Vitals and nursing note reviewed.   Constitutional:       General: She is in acute distress.      Comments: Awake and lying flat in bed with blanket over eyes   HENT:      Head: Normocephalic and atraumatic.      Right Ear: External ear normal.      Left Ear: External ear normal.      Nose: Nose normal.      Mouth/Throat:      Mouth: Mucous membranes are dry.      Pharynx: Oropharynx is clear.   Eyes:      Pupils: Pupils are equal, round, and reactive to light.      Comments: Lateral horizontal gaze improved from yesterday   Neck:      Comments: Neck pain, improved w lying flat w/o pillow  Cardiovascular:      Rate and Rhythm: Normal rate.      Pulses: Normal pulses.   Pulmonary:      Effort: Pulmonary effort is normal. No respiratory distress.   Abdominal:      Palpations: Abdomen is soft.   Skin:     General: Skin is warm.      Capillary Refill: Capillary refill takes less than 2 seconds.   Neurological:      Comments: E4 V5 M6  Awake, eyes mostly closed 2/2 photophobia but does spontaneously open. Oriented x3. Speech fluent- no dysarthria or aphasia. Following commands.   CN II-XII grossly intact, specifically:  PERRL. EOMI.   Visual fields intact   No facial asymmetry.    Tongue midline.    Shoulder shrug symmetric.  Motor:  Moving all extremities spontaneously, antigravity, and to command  Generalized weakness throughout 4+/5  Sensation intact x4              Medications:  Continuous Scheduledenoxparin, 40 mg, Q24H (prophylaxis, 1700)  levETIRAcetam, 500 mg, BID  levothyroxine, 25 mcg, Before breakfast  LIDOcaine, 1 patch, Q24H  methocarbamoL, 500 mg, QID  mupirocin, , BID  niMODipine, 60 mg, Q4H  pregabalin, 150 mg, BID  senna-docusate 8.6-50 mg, 1 tablet, Daily  silodosin, 4 mg, Daily  sodium chloride 0.9%, 1,000 mL, Once    PRNacetaminophen, 1,000 mg, Q8H PRN  dextrose 10%, 12.5 g, PRN  dextrose 10%, 25 g, PRN  glucagon (human recombinant), 1 mg, PRN  hydrALAZINE, 10 mg, Q4H PRN  insulin aspart U-100, 0-10 Units, Q6H PRN  labetalol, 10 mg, Q4H PRN  magnesium oxide, 800 mg, PRN  magnesium oxide, 800 mg, PRN  morphine, 2 mg, Q2H PRN  oxyCODONE, 5 mg, Q6H PRN  potassium bicarbonate, 35 mEq, PRN  potassium bicarbonate, 50 mEq, PRN  potassium bicarbonate, 60 mEq, PRN  potassium, sodium phosphates, 2 packet, PRN  potassium, sodium phosphates, 2 packet, PRN  potassium, sodium phosphates, 2 packet, PRN      Today I personally reviewed pertinent medications, lines/drains/airways, imaging, laboratory results, notably:  TCDs 1/12 negative for spasm  K and phos low, replaced  1/13 CTH w maturing SAH and IVH, stable    Diet  Diet Adult Regular (IDDSI Level 7)  Diet Adult Regular (IDDSI Level 7)        Assessment/Plan:     Neuro  * Anterior communicating artery aneurysm  59 y/o presents to [facility] with thunderclap HA. Per [facility], reportedly no acute intracranial abnormality on CT head, though upon review at bedside, area of questionable hyperdensity with A Comm aneurysm on CTA. LP performed at [facility] with reported xanthochromia, though not documented on paper chart. Further LP results from [facility] as follows CSF color, red; ROSEMARY CSF, bloody; CSF volume, 14; WBC, 31; RBC 51,000; Protein 64.8; Glucose 73; Gram stain without  organisms seen.    -Admit to NCC  -NSGY, VN consulted  -q1h neuro checks, vital checks  -EKG, ECHO, CXR  -A1c, TSH, lipid panel, coag panel  -Daily CBC, CMP, mag, phos  -SBP < 140, prn labetalol and hydralazine  -SCDs, hold DVT chemoppx in acute period   -Keppra 500 bid  -Nimotop 60q4h  -PT/OT  -MRI/MRA w/wo with vessel wall imaging pending  -IR consutled for DSA  - No need for EVD as of yet  - s/p coiling on 01/11  - 1/12 TCDs w mildly elevated velocities not meeting criteria for spasm, on nimotop  - 1/13 CTH w maturing SAH and IVH, overall stable  - Gabapentin changed to lyrica 150 BID. Consider increasing to 300 for persistent HA  - SCDs; lovenox    Thunderclap headache  Pain control as needed  See primary problem     Psychiatric  Recurrent major depression in partial remission  Previously on Trintellix per paper chart  Confirm if patient is still taking and resume as appropriate    Pulmonary  Mild intermittent asthma without complication  PRN duonebs    Cardiac/Vascular  Essential hypertension  -180mmHg  - SBP within parameters, requires prns occasionally when pain level increases      Renal/  Urinary retention  Started on silodosin 4mg overnight for urinary retention requiring I&O cath w 1.2L urine. Likely due to pt being in bed.   Continue silodosin while in hospital  Likely able to discontinue on discharge    Endocrine  Acquired hypothyroidism  Resume home synthroid   Known history or thyroid cyst/goiter          The patient is being Prophylaxed for:  Venous Thromboembolism with: Mechanical or Chemical  Stress Ulcer with: Not Applicable   Ventilator Pneumonia with: not applicable    Activity Orders            Diet Adult Regular (IDDSI Level 7): Regular starting at 01/11 1950    Turn patient starting at 01/10 2200    Elevate HOB starting at 01/10 2156          Full Code    Critical care time spent on the evaluation and treatment of severe organ dysfunction, review of pertinent labs and imaging  studies, discussions with consulting providers and discussions with patient/family: 35 minutes.    Max Dietrich PA-C  Neurocritical Care  [facility] - Neuro Critical Care

## 2024-01-13 NOTE — NURSING
0300 Pt with c/o 10/10 HA after prn morphine/ oxy/ tylenol already given. Pt received  extra one time dose of morphine HA still 10/10. Pt currently reporting difficulty hearing, unable to open eyes d/t HA, as well as new numbness to L side of face, LUE and LLE.   VINICIO Linares notified >> STAT CT ordered.

## 2024-01-13 NOTE — SUBJECTIVE & OBJECTIVE
Interval History: NAEON. PBD 8. Negative 300cc this AM however some urine lost when measuring from bed pan per RN. Patient drinking . -180 overnight. Continues to have headache despite Tylenol, PRN narcotics, migraine cocktails. CTH this AM with minimal SAH, stable ventricular caliber. TCDs yesterday w/o spasm.     Medications:  Continuous Infusions:  Scheduled Meds:   enoxparin  40 mg Subcutaneous Q24H (prophylaxis, 1700)    gabapentin  400 mg Oral Q8H    levETIRAcetam  500 mg Oral BID    levothyroxine  25 mcg Oral Before breakfast    LIDOcaine  1 patch Transdermal Q24H    methocarbamoL  500 mg Oral QID    mupirocin   Nasal BID    niMODipine  60 mg Oral Q4H    senna-docusate 8.6-50 mg  1 tablet Oral Daily    silodosin  4 mg Oral Daily     PRN Meds:acetaminophen, dextrose 10%, dextrose 10%, glucagon (human recombinant), hydrALAZINE, insulin aspart U-100, labetalol, magnesium oxide, magnesium oxide, morphine, oxyCODONE, potassium bicarbonate, potassium bicarbonate, potassium bicarbonate, potassium, sodium phosphates, potassium, sodium phosphates, potassium, sodium phosphates     Review of Systems  Objective:     Weight: 97.1 kg (214 lb)  Body mass index is 33.52 kg/m².  Vital Signs (Most Recent):  Temp: 98.8 °F (37.1 °C) (01/13/24 0301)  Pulse: 76 (01/13/24 0401)  Resp: 15 (01/13/24 0606)  BP: (!) 165/71 (01/13/24 0605)  SpO2: 99 % (01/13/24 0401) Vital Signs (24h Range):  Temp:  [98.5 °F (36.9 °C)-98.9 °F (37.2 °C)] 98.8 °F (37.1 °C)  Pulse:  [76-95] 76  Resp:  [14-27] 15  SpO2:  [92 %-100 %] 99 %  BP: (150-180)/(66-91) 165/71  Arterial Line BP: (129-200)/(51-82) 161/60                                 Physical Exam         Neurosurgery Physical Exam  Constitutional:       Appearance: She is well-developed and well-nourished.   Eyes:      Extraocular Movements: EOM normal.      Conjunctiva/sclera: Conjunctivae normal.      Pupils: Pupils are equal, round, and reactive to light.   Cardiovascular:      Pulses:  "Normal pulses.   Abdominal:      Palpations: Abdomen is soft.   Neurological:      Mental Status: She is alert and oriented to person, place, and time.      Comments: [id number]  AAOx3  PERRL  Cranial nerves intact  Vision grossly normal  Patient follows commands all extremities; slightly uncooperative state  At least 4/5 strength in all extremities; non focal on exam\  SILT  DTRs normal     Significant Labs:  Recent Labs   Lab 01/12/24  0127 01/13/24  0301   * 108    141   K 3.5 3.2*   * 107   CO2 18* 25   BUN 12 10   CREATININE 0.6 0.6   CALCIUM 8.5* 8.4*   MG 2.4 1.8     Recent Labs   Lab 01/12/24  0127 01/13/24  0301   WBC 11.76 16.95*   HGB 11.7* 11.2*   HCT 35.3* 35.2*    248     No results for input(s): "LABPT", "INR", "APTT" in the last 48 hours.  Microbiology Results (last 7 days)       ** No results found for the last 168 hours. **          All pertinent labs from the last 24 hours have been reviewed.    Significant Diagnostics:  I have reviewed and interpreted all pertinent imaging results/findings within the past 24 hours.    CTH as above  "

## 2024-01-13 NOTE — ASSESSMENT & PLAN NOTE
Started on silodosin 4mg overnight for urinary retention requiring I&O cath w 1.2L urine. Likely due to pt being in bed.   Continue silodosin while in hospital  Likely able to discontinue on discharge

## 2024-01-13 NOTE — PT/OT/SLP EVAL
Physical Therapy Co-Evaluation and Co-Treatment with OT    Patient Name:  Loreto Goff   MRN:  [id number]    Recent Surgery: * No surgery found *      *Co-treatment with OT due to patient complexity and need for two skilled therapists to ensure safe mobilization.    Recommendations:     Discharge Recommendations:    High intensity therapy  Discharge Equipment Recommendations: walker, rolling, to be determined by next level of care   Barriers to discharge: Increased level of assist    Highest Level of Mobility: Supine to sit   Assistance Required: Max(A)X2    Assessment:     Loreto Goff is a 58 y.o. female admitted with a medical diagnosis of Anterior communicating artery aneurysm. She presents with the following impairments/functional limitations:  impaired endurance, impaired self care skills, decreased upper extremity function, impaired functional mobility, decreased lower extremity function, decreased safety awareness, impaired balance, pain, impaired cardiopulmonary response to activity    Pt met with HOB elevated and agreeable to PT session. Pt reports her PLOF is (I) with functional mobility and ADLs using no DME. Currently, pt requires max(A)X2 persons for supine to sit. She is highly limited by a severe HA during session, possibly affecting pt assist levels. Pt refused to attempt transfers today due to worsening HA at EOB. Pt also presents with photophobia. Eval completed in a dim room but pt maintained eyes closed for nearly 100% of session.     Pt would benefit from continued skilled acute PT 4x/wk to address above listed functional deficits, provide patient/caregiver education, reduce fall risk, and maximize (I) and safety with functional mobility.     Rehab Prognosis: Good; patient would benefit from acute skilled PT services to address these deficits and reach maximum level of function.      Plan:     During this hospitalization, patient to be seen 4 x/week to address the identified rehab impairments via  "gait training, therapeutic activities, therapeutic exercises, neuromuscular re-education and progress toward the following goals:    Plan of Care Expires:  02/13/24    This plan of care has been discussed with the patient/caregiver, who was included in its development and is in agreement with the identified goals and treatment plan.     Subjective     Communicated with RN prior to session.  Patient agreeable to participate.     Chief Complaint: Anterior communicating artery aneurysm   Patient/Family Comments/goals: "I can't do it, my head hurts too bad"    Pain/Comfort:  Pain Rating 1: 10/10  Location - Orientation 1: generalized  Location 1: head  Pain Addressed 1: Reposition, Distraction, Pre-medicate for activity, Cessation of Activity  Pain Rating Post-Intervention 1: 10/10    Patients cultural, spiritual, Mu-ism conflicts given the current situation: no    Patient's living environment is as follows:  Living Environment: Pt lives with spouse in [facility] with 2 NEETU, no HR. Bathroom set-up: tub/shower combo, grab bar in place  Prior Level of Function: independent with mobility and ADLs  DME used: none  DME owned (not currently used): none  Upon discharge, patient will have assistance from: Spouse    Objective:     Patient found HOB elevated with arterial line, bed alarm, blood pressure cuff, telemetry, pulse ox (continuous), peripheral IV, oxygen  upon PT entry to room.    General Precautions: Standard, aspiration, fall   Orthopedic Precautions:N/A   Braces: N/A   BP (!) 175/75   Pulse 76   Temp 98.4 °F (36.9 °C) (Oral)   Resp 16   Ht 5' 7" (1.702 m)   Wt 97.1 kg (214 lb)   SpO2 100%   BMI 33.52 kg/m²   Oxygen Device: nasal cannula      Exams:    Cognition:  Patient is oriented to Person, Place, Time, Situation  Follows one-step commands  Insight to deficits/safety awareness: impaired    Edema: None present    Postural examination/scapula alignment: Rounded shoulder    Lower Extremity Range of Motion:  Right " Lower Extremity: WNL  Left Lower Extremity: WNL    Lower Extremity Strength    Right LE  Left LE    Hip Flexion: 4/5 Hip Flexion: 4/5   Knee Extension: 4+/5 Knee Extension: 4+/5   Knee Flexion: 4+/5 Knee Flexion: 4+/5   Ankle Dorsiflexion:  4+/5 Ankle Dorsiflexion: 4+/5   Ankle Plantarflexion: 4+/5 Ankle Plantarflexion: 4+/5        Sensation:   Light touch sensation: Intact BLEs    Functional Mobility:    Bed Mobility:  Supine to Sit: Maximum Assistance and 2 persons  on L side of bed  Sit to Supine: Maximum Assistance and 2 persons    Transfers:   Sit to Stand Transfer: Activity did not occur , pt refused due to HA    Balance:  Static Sit:   Initially max(A), progressed to SBA  at EOB x 5 minutes  Initial posterior push  Pt maintained eyes closed        Therapeutic Activities/Exercises     Patient assisted with functional mobility as noted above   PT educated pt on the importance of elevating HOB  Patient instructed to reposition in bed at least every 2 hours to reduce the risk of skin breakdown during hospital stay.  Discussed at length benefits of PT as well as d/c recommendations. Pt agreeable  Patient educated on the importance of early mobility, OOB to prevent functional decline during hospital stay  Patient was instructed to utilize staff assistance for mobility/transfers.  Patient educated on PT POC and role of PT in acute care  White board updated to include patient's safest level of mobility with staff assistance, RN also updated    AM-PAC 6 CLICK MOBILITY  Turning over in bed (including adjusting bedclothes, sheets and blankets)?: 3  Sitting down on and standing up from a chair with arms (e.g., wheelchair, bedside commode, etc.): 3  Moving from lying on back to sitting on the side of the bed?: 3  Moving to and from a bed to a chair (including a wheelchair)?: 3  Need to walk in hospital room?: 3  Climbing 3-5 steps with a railing?: 3  Basic Mobility Total Score: 18      Patient left  HOB 15 degrees  with  all lines intact, call button in reach, bed alarm on, and RN notified.      History/Goals:     PAST MEDICAL HISTORY:  History reviewed. No pertinent past medical history.    History reviewed. No pertinent surgical history.    GOALS:   Multidisciplinary Problems       Physical Therapy Goals          Problem: Physical Therapy    Goal Priority Disciplines Outcome Goal Variances Interventions   Physical Therapy Goal     PT, PT/OT Ongoing, Progressing     Description: Goals to be met by: 24     Patient will increase functional independence with mobility by performin. Supine to sit with Modified Centre  2. Sit to supine with Modified Centre  3. Sit to stand transfer with Modified Centre  4. Bed to chair transfer with Stand-by Assistance using LRAD as needed  5. Gait  x 150 feet with Stand-by Assistance using LRAD as needed.   6. Ascend/descend 2 stair with no Handrails and Stand-by Assistance  7. Lower extremity exercise program x15 reps per handout, with assistance as needed                         Time Tracking:     PT Received On: 24  PT Start Time: 1337     PT Stop Time: 1400  PT Total Time (min): 23 min     Billable Minutes: Evaluation 10 and Therapeutic Activity 13      Agata Mckeon, PT  2024  Pager# [phone]

## 2024-01-13 NOTE — PLAN OF CARE
Problem: Occupational Therapy  Goal: Occupational Therapy Goal  Description: Goals to be met by: 2/13/24     Patient will increase functional independence with ADLs by performing:    UE Dressing with Supervision.  LE Dressing with Contact Guard Assistance.  Grooming while seated with Stand-by Assistance.  Supine to sit with Minimal Assistance.    Outcome: Ongoing, Progressing   Patient tolerated OT eval. Goals and POC established

## 2024-01-13 NOTE — SUBJECTIVE & OBJECTIVE
Interval History:      Review of Systems   Constitutional:  Negative for fever.   HENT:  Negative for trouble swallowing.    Eyes:  Positive for photophobia.   Respiratory:  Negative for shortness of breath.    Cardiovascular:  Negative for chest pain.   Gastrointestinal:  Negative for abdominal pain.   Endocrine: Negative.    Genitourinary: Negative.    Musculoskeletal:  Positive for neck pain.   Skin: Negative.    Allergic/Immunologic: Negative.    Neurological:  Positive for headaches.   Hematological: Negative.    Psychiatric/Behavioral: Negative.         Objective:     Vitals:  Temp: 98.4 °F (36.9 °C)  Pulse: 76  Rhythm: normal sinus rhythm  BP: (!) 180/81  MAP (mmHg): 117  Resp: 20  SpO2: 100 %    Temp  Min: 98.4 °F (36.9 °C)  Max: 98.9 °F (37.2 °C)  Pulse  Min: 71  Max: 92  BP  Min: 133/60  Max: 180/81  MAP (mmHg)  Min: 87  Max: 131  Resp  Min: 14  Max: 27  SpO2  Min: 92 %  Max: 100 %    01/12 0701 - 01/13 0700  In: 4095 [P.O.:3995; I.V.:100]  Out: 3790 [Urine:3790]   Unmeasured Output  Urine Occurrence: 1  Stool Occurrence: 1  Pad Count: 3        Physical Exam  Vitals and nursing note reviewed.   Constitutional:       General: She is in acute distress.      Comments: Awake and lying flat in bed with blanket over eyes   HENT:      Head: Normocephalic and atraumatic.      Right Ear: External ear normal.      Left Ear: External ear normal.      Nose: Nose normal.      Mouth/Throat:      Mouth: Mucous membranes are dry.      Pharynx: Oropharynx is clear.   Eyes:      Pupils: Pupils are equal, round, and reactive to light.      Comments: Lateral horizontal gaze improved from yesterday   Neck:      Comments: Neck pain, improved w lying flat w/o pillow  Cardiovascular:      Rate and Rhythm: Normal rate.      Pulses: Normal pulses.   Pulmonary:      Effort: Pulmonary effort is normal. No respiratory distress.   Abdominal:      Palpations: Abdomen is soft.   Skin:     General: Skin is warm.      Capillary Refill:  Capillary refill takes less than 2 seconds.   Neurological:      Comments: E4 V5 M6  Awake, eyes mostly closed 2/2 photophobia but does spontaneously open. Oriented x3. Speech fluent- no dysarthria or aphasia. Following commands.   CN II-XII grossly intact, specifically:  PERRL. EOMI.   Visual fields intact   No facial asymmetry.    Tongue midline.   Shoulder shrug symmetric.  Motor:  Moving all extremities spontaneously, antigravity, and to command  Generalized weakness throughout 4+/5  Sensation intact x4              Medications:  Continuous Scheduledenoxparin, 40 mg, Q24H (prophylaxis, 1700)  levETIRAcetam, 500 mg, BID  levothyroxine, 25 mcg, Before breakfast  LIDOcaine, 1 patch, Q24H  methocarbamoL, 500 mg, QID  mupirocin, , BID  niMODipine, 60 mg, Q4H  pregabalin, 150 mg, BID  senna-docusate 8.6-50 mg, 1 tablet, Daily  silodosin, 4 mg, Daily  sodium chloride 0.9%, 1,000 mL, Once    PRNacetaminophen, 1,000 mg, Q8H PRN  dextrose 10%, 12.5 g, PRN  dextrose 10%, 25 g, PRN  glucagon (human recombinant), 1 mg, PRN  hydrALAZINE, 10 mg, Q4H PRN  insulin aspart U-100, 0-10 Units, Q6H PRN  labetalol, 10 mg, Q4H PRN  magnesium oxide, 800 mg, PRN  magnesium oxide, 800 mg, PRN  morphine, 2 mg, Q2H PRN  oxyCODONE, 5 mg, Q6H PRN  potassium bicarbonate, 35 mEq, PRN  potassium bicarbonate, 50 mEq, PRN  potassium bicarbonate, 60 mEq, PRN  potassium, sodium phosphates, 2 packet, PRN  potassium, sodium phosphates, 2 packet, PRN  potassium, sodium phosphates, 2 packet, PRN      Today I personally reviewed pertinent medications, lines/drains/airways, imaging, laboratory results, notably:  TCDs 1/12 negative for spasm  K and phos low, replaced  1/13 CTH w maturing SAH and IVH, stable    Diet  Diet Adult Regular (IDDSI Level 7)  Diet Adult Regular (IDDSI Level 7)

## 2024-01-14 PROBLEM — R33.9 URINARY RETENTION: Status: RESOLVED | Noted: 2024-01-13 | Resolved: 2024-01-14

## 2024-01-14 LAB
ABO + RH BLD: NORMAL
ALBUMIN SERPL BCP-MCNC: 3.4 G/DL (ref 3.5–5.2)
ALP SERPL-CCNC: 87 U/L (ref 55–135)
ALT SERPL W/O P-5'-P-CCNC: 61 U/L (ref 10–44)
ANION GAP SERPL CALC-SCNC: 12 MMOL/L (ref 8–16)
AST SERPL-CCNC: 35 U/L (ref 10–40)
BASOPHILS # BLD AUTO: 0.02 K/UL (ref 0–0.2)
BASOPHILS NFR BLD: 0.1 % (ref 0–1.9)
BILIRUB SERPL-MCNC: 0.4 MG/DL (ref 0.1–1)
BLD GP AB SCN CELLS X3 SERPL QL: NORMAL
BUN SERPL-MCNC: 8 MG/DL (ref 6–20)
CALCIUM SERPL-MCNC: 9.2 MG/DL (ref 8.7–10.5)
CHLORIDE SERPL-SCNC: 102 MMOL/L (ref 95–110)
CO2 SERPL-SCNC: 24 MMOL/L (ref 23–29)
CREAT SERPL-MCNC: 0.6 MG/DL (ref 0.5–1.4)
DIFFERENTIAL METHOD BLD: ABNORMAL
EOSINOPHIL # BLD AUTO: 0 K/UL (ref 0–0.5)
EOSINOPHIL NFR BLD: 0.3 % (ref 0–8)
ERYTHROCYTE [DISTWIDTH] IN BLOOD BY AUTOMATED COUNT: 13.4 % (ref 11.5–14.5)
EST. GFR  (NO RACE VARIABLE): >60 ML/MIN/1.73 M^2
GLUCOSE SERPL-MCNC: 145 MG/DL (ref 70–110)
HCT VFR BLD AUTO: 37.4 % (ref 37–48.5)
HGB BLD-MCNC: 12.4 G/DL (ref 12–16)
IMM GRANULOCYTES # BLD AUTO: 0.06 K/UL (ref 0–0.04)
IMM GRANULOCYTES NFR BLD AUTO: 0.4 % (ref 0–0.5)
LYMPHOCYTES # BLD AUTO: 2.8 K/UL (ref 1–4.8)
LYMPHOCYTES NFR BLD: 19.4 % (ref 18–48)
MAGNESIUM SERPL-MCNC: 2 MG/DL (ref 1.6–2.6)
MCH RBC QN AUTO: 29.4 PG (ref 27–31)
MCHC RBC AUTO-ENTMCNC: 33.2 G/DL (ref 32–36)
MCV RBC AUTO: 89 FL (ref 82–98)
MONOCYTES # BLD AUTO: 0.9 K/UL (ref 0.3–1)
MONOCYTES NFR BLD: 6.3 % (ref 4–15)
NEUTROPHILS # BLD AUTO: 10.6 K/UL (ref 1.8–7.7)
NEUTROPHILS NFR BLD: 73.5 % (ref 38–73)
NRBC BLD-RTO: 0 /100 WBC
PHOSPHATE SERPL-MCNC: 3 MG/DL (ref 2.7–4.5)
PLATELET # BLD AUTO: 261 K/UL (ref 150–450)
PMV BLD AUTO: 10.7 FL (ref 9.2–12.9)
POCT GLUCOSE: 114 MG/DL (ref 70–110)
POCT GLUCOSE: 143 MG/DL (ref 70–110)
POTASSIUM SERPL-SCNC: 4.1 MMOL/L (ref 3.5–5.1)
PROT SERPL-MCNC: 7.2 G/DL (ref 6–8.4)
RBC # BLD AUTO: 4.22 M/UL (ref 4–5.4)
SODIUM SERPL-SCNC: 138 MMOL/L (ref 136–145)
SODIUM SERPL-SCNC: 138 MMOL/L (ref 136–145)
SPECIMEN OUTDATE: NORMAL
WBC # BLD AUTO: 14.36 K/UL (ref 3.9–12.7)

## 2024-01-14 PROCEDURE — 83735 ASSAY OF MAGNESIUM: CPT

## 2024-01-14 PROCEDURE — 84100 ASSAY OF PHOSPHORUS: CPT

## 2024-01-14 PROCEDURE — 84295 ASSAY OF SERUM SODIUM: CPT

## 2024-01-14 PROCEDURE — 63600175 PHARM REV CODE 636 W HCPCS

## 2024-01-14 PROCEDURE — 25000003 PHARM REV CODE 250: Performed by: PSYCHIATRY & NEUROLOGY

## 2024-01-14 PROCEDURE — 25000003 PHARM REV CODE 250

## 2024-01-14 PROCEDURE — 20000000 HC ICU ROOM

## 2024-01-14 PROCEDURE — 86850 RBC ANTIBODY SCREEN: CPT | Performed by: PSYCHIATRY & NEUROLOGY

## 2024-01-14 PROCEDURE — 85025 COMPLETE CBC W/AUTO DIFF WBC: CPT

## 2024-01-14 PROCEDURE — 99291 CRITICAL CARE FIRST HOUR: CPT | Mod: ,,,

## 2024-01-14 PROCEDURE — 63600175 PHARM REV CODE 636 W HCPCS: Mod: JZ,JG | Performed by: PSYCHIATRY & NEUROLOGY

## 2024-01-14 PROCEDURE — 94761 N-INVAS EAR/PLS OXIMETRY MLT: CPT

## 2024-01-14 PROCEDURE — 80053 COMPREHEN METABOLIC PANEL: CPT

## 2024-01-14 RX ORDER — FLUDROCORTISONE ACETATE 0.1 MG/1
100 TABLET ORAL 2 TIMES DAILY
Status: DISCONTINUED | OUTPATIENT
Start: 2024-01-14 | End: 2024-01-17

## 2024-01-14 RX ORDER — NICARDIPINE HYDROCHLORIDE 0.2 MG/ML
0-15 INJECTION INTRAVENOUS CONTINUOUS
Status: DISCONTINUED | OUTPATIENT
Start: 2024-01-14 | End: 2024-01-16

## 2024-01-14 RX ORDER — NICARDIPINE HYDROCHLORIDE 0.2 MG/ML
INJECTION INTRAVENOUS
Status: COMPLETED
Start: 2024-01-14 | End: 2024-01-14

## 2024-01-14 RX ORDER — METOCLOPRAMIDE HYDROCHLORIDE 5 MG/ML
10 INJECTION INTRAMUSCULAR; INTRAVENOUS ONCE
Status: COMPLETED | OUTPATIENT
Start: 2024-01-14 | End: 2024-01-14

## 2024-01-14 RX ORDER — ONDANSETRON HYDROCHLORIDE 2 MG/ML
4 INJECTION, SOLUTION INTRAVENOUS EVERY 6 HOURS PRN
Status: DISCONTINUED | OUTPATIENT
Start: 2024-01-14 | End: 2024-01-24 | Stop reason: HOSPADM

## 2024-01-14 RX ORDER — MAGNESIUM SULFATE HEPTAHYDRATE 40 MG/ML
2 INJECTION, SOLUTION INTRAVENOUS ONCE
Status: COMPLETED | OUTPATIENT
Start: 2024-01-14 | End: 2024-01-14

## 2024-01-14 RX ORDER — SODIUM CHLORIDE 9 MG/ML
INJECTION, SOLUTION INTRAVENOUS CONTINUOUS
Status: ACTIVE | OUTPATIENT
Start: 2024-01-14 | End: 2024-01-15

## 2024-01-14 RX ADMIN — HYDRALAZINE HYDROCHLORIDE 10 MG: 20 INJECTION, SOLUTION INTRAMUSCULAR; INTRAVENOUS at 02:01

## 2024-01-14 RX ADMIN — MORPHINE SULFATE 2 MG: 2 INJECTION, SOLUTION INTRAMUSCULAR; INTRAVENOUS at 07:01

## 2024-01-14 RX ADMIN — NIMODIPINE 60 MG: 30 CAPSULE, LIQUID FILLED ORAL at 10:01

## 2024-01-14 RX ADMIN — LEVETIRACETAM 500 MG: 500 TABLET, FILM COATED ORAL at 08:01

## 2024-01-14 RX ADMIN — LIDOCAINE 1 PATCH: 50 PATCH TOPICAL at 02:01

## 2024-01-14 RX ADMIN — HYDRALAZINE HYDROCHLORIDE 10 MG: 20 INJECTION, SOLUTION INTRAMUSCULAR; INTRAVENOUS at 08:01

## 2024-01-14 RX ADMIN — NIMODIPINE 60 MG: 30 CAPSULE, LIQUID FILLED ORAL at 02:01

## 2024-01-14 RX ADMIN — SODIUM CHLORIDE 500 ML: 0.9 INJECTION, SOLUTION INTRAVENOUS at 04:01

## 2024-01-14 RX ADMIN — NIMODIPINE 60 MG: 30 CAPSULE, LIQUID FILLED ORAL at 06:01

## 2024-01-14 RX ADMIN — OXYCODONE HYDROCHLORIDE 5 MG: 5 TABLET ORAL at 08:01

## 2024-01-14 RX ADMIN — NICARDIPINE HYDROCHLORIDE 2.5 MG/HR: 0.2 INJECTION, SOLUTION INTRAVENOUS at 10:01

## 2024-01-14 RX ADMIN — MORPHINE SULFATE 2 MG: 2 INJECTION, SOLUTION INTRAMUSCULAR; INTRAVENOUS at 01:01

## 2024-01-14 RX ADMIN — OXYCODONE HYDROCHLORIDE 5 MG: 5 TABLET ORAL at 03:01

## 2024-01-14 RX ADMIN — FLUDROCORTISONE ACETATE 100 MCG: 0.1 TABLET ORAL at 10:01

## 2024-01-14 RX ADMIN — HYDRALAZINE HYDROCHLORIDE 10 MG: 20 INJECTION, SOLUTION INTRAMUSCULAR; INTRAVENOUS at 06:01

## 2024-01-14 RX ADMIN — METHOCARBAMOL 500 MG: 500 TABLET ORAL at 10:01

## 2024-01-14 RX ADMIN — LEVETIRACETAM 500 MG: 500 TABLET, FILM COATED ORAL at 10:01

## 2024-01-14 RX ADMIN — ENOXAPARIN SODIUM 40 MG: 100 INJECTION SUBCUTANEOUS at 04:01

## 2024-01-14 RX ADMIN — METHOCARBAMOL 500 MG: 500 TABLET ORAL at 04:01

## 2024-01-14 RX ADMIN — SODIUM CHLORIDE 2000 ML: 9 INJECTION, SOLUTION INTRAVENOUS at 02:01

## 2024-01-14 RX ADMIN — PREGABALIN 150 MG: 75 CAPSULE ORAL at 08:01

## 2024-01-14 RX ADMIN — MUPIROCIN: 20 OINTMENT TOPICAL at 08:01

## 2024-01-14 RX ADMIN — SODIUM CHLORIDE: 9 INJECTION, SOLUTION INTRAVENOUS at 04:01

## 2024-01-14 RX ADMIN — MORPHINE SULFATE 2 MG: 2 INJECTION, SOLUTION INTRAMUSCULAR; INTRAVENOUS at 04:01

## 2024-01-14 RX ADMIN — HYDRALAZINE HYDROCHLORIDE 10 MG: 20 INJECTION, SOLUTION INTRAMUSCULAR; INTRAVENOUS at 04:01

## 2024-01-14 RX ADMIN — MORPHINE SULFATE 2 MG: 2 INJECTION, SOLUTION INTRAMUSCULAR; INTRAVENOUS at 03:01

## 2024-01-14 RX ADMIN — MAGNESIUM SULFATE 2 G: 2 INJECTION INTRAVENOUS at 04:01

## 2024-01-14 RX ADMIN — METOCLOPRAMIDE 10 MG: 5 INJECTION, SOLUTION INTRAMUSCULAR; INTRAVENOUS at 03:01

## 2024-01-14 RX ADMIN — SODIUM CHLORIDE 1000 ML: 9 INJECTION, SOLUTION INTRAVENOUS at 08:01

## 2024-01-14 RX ADMIN — SODIUM CHLORIDE: 9 INJECTION, SOLUTION INTRAVENOUS at 10:01

## 2024-01-14 RX ADMIN — LEVOTHYROXINE SODIUM 25 MCG: 25 TABLET ORAL at 06:01

## 2024-01-14 RX ADMIN — MUPIROCIN: 20 OINTMENT TOPICAL at 10:01

## 2024-01-14 RX ADMIN — SENNOSIDES AND DOCUSATE SODIUM 1 TABLET: 50; 8.6 TABLET ORAL at 08:01

## 2024-01-14 RX ADMIN — LABETALOL HYDROCHLORIDE 10 MG: 5 INJECTION, SOLUTION INTRAVENOUS at 07:01

## 2024-01-14 RX ADMIN — METHOCARBAMOL 500 MG: 500 TABLET ORAL at 12:01

## 2024-01-14 RX ADMIN — METHOCARBAMOL 500 MG: 500 TABLET ORAL at 08:01

## 2024-01-14 RX ADMIN — PREGABALIN 225 MG: 150 CAPSULE ORAL at 10:01

## 2024-01-14 NOTE — ASSESSMENT & PLAN NOTE
Started on silodosin 4mg for urinary retention requiring I&O cath w 1.2L urine. Later found to be able to urinate on own but preferred straight cath as HA was too severe to get out of bed to bedside commode  Discontinued silodosin

## 2024-01-14 NOTE — SUBJECTIVE & OBJECTIVE
Interval History:      Review of Systems   Constitutional:  Negative for fever.   HENT:  Negative for trouble swallowing.    Eyes:  Positive for photophobia.   Respiratory:  Negative for shortness of breath.    Cardiovascular:  Negative for chest pain.   Gastrointestinal:  Negative for abdominal pain.   Endocrine: Negative.    Genitourinary: Negative.  Negative for difficulty urinating.   Musculoskeletal:  Positive for neck pain.   Skin: Negative.    Allergic/Immunologic: Negative.    Neurological:  Positive for headaches.   Hematological: Negative.    Psychiatric/Behavioral: Negative.         Objective:     Vitals:  Temp: 99.8 °F (37.7 °C)  Pulse: 92  Rhythm: normal sinus rhythm  BP: (!) 163/70  MAP (mmHg): 101  Resp: 16  SpO2: (!) 93 %    Temp  Min: 98.4 °F (36.9 °C)  Max: 100.4 °F (38 °C)  Pulse  Min: 66  Max: 100  BP  Min: 123/60  Max: 206/92  MAP (mmHg)  Min: 84  Max: 132  Resp  Min: 14  Max: 37  SpO2  Min: 90 %  Max: 100 %    01/13 0701 - 01/14 0700  In: 4828.1 [P.O.:475; I.V.:2854.1]  Out: 7935 [Urine:7935]   Unmeasured Output  Urine Occurrence: 1  Stool Occurrence: 0  Pad Count: 3        Physical Exam  Vitals and nursing note reviewed.   Constitutional:       General: She is in acute distress.      Comments: Awake and lying flat in bed with blanket over eyes   HENT:      Head: Normocephalic and atraumatic.      Right Ear: External ear normal.      Left Ear: External ear normal.      Nose: Nose normal.      Mouth/Throat:      Mouth: Mucous membranes are dry.      Pharynx: Oropharynx is clear.   Eyes:      Extraocular Movements: Extraocular movements intact.      Pupils: Pupils are equal, round, and reactive to light.   Neck:      Comments: Neck pain, improved w lying flat w/o pillow  Cardiovascular:      Rate and Rhythm: Normal rate.      Pulses: Normal pulses.   Pulmonary:      Effort: Pulmonary effort is normal. No respiratory distress.   Abdominal:      Palpations: Abdomen is soft.   Skin:     General:  Skin is warm.      Capillary Refill: Capillary refill takes less than 2 seconds.   Neurological:      Comments: E4 V5 M6  Awake, eyes mostly closed 2/2 photophobia but does spontaneously open. Oriented x3. Speech fluent- no dysarthria or aphasia. Following commands. Reduced participation 2/2 HA.  CN II-XII grossly intact, specifically:  PERRL. EOMI.   No facial asymmetry.    Motor:  Moving all extremities spontaneously, antigravity, and to command  Generalized weakness throughout 4+/5 due to HA  Sensation intact x4              Medications:  Continuous Scheduledenoxparin, 40 mg, Q24H (prophylaxis, 1700)  fludrocortisone, 100 mcg, BID  levETIRAcetam, 500 mg, BID  levothyroxine, 25 mcg, Before breakfast  LIDOcaine, 1 patch, Q24H  methocarbamoL, 500 mg, QID  mupirocin, , BID  niMODipine, 60 mg, Q4H  pregabalin, 225 mg, BID  senna-docusate 8.6-50 mg, 1 tablet, Daily    PRNacetaminophen, 1,000 mg, Q8H PRN  dextrose 10%, 12.5 g, PRN  dextrose 10%, 25 g, PRN  glucagon (human recombinant), 1 mg, PRN  hydrALAZINE, 10 mg, Q4H PRN  insulin aspart U-100, 0-10 Units, Q6H PRN  labetalol, 10 mg, Q4H PRN  magnesium oxide, 800 mg, PRN  magnesium oxide, 800 mg, PRN  morphine, 2 mg, Q2H PRN  ondansetron, 4 mg, Q6H PRN  oxyCODONE, 5 mg, Q6H PRN  potassium bicarbonate, 35 mEq, PRN  potassium bicarbonate, 50 mEq, PRN  potassium bicarbonate, 60 mEq, PRN  potassium, sodium phosphates, 2 packet, PRN  potassium, sodium phosphates, 2 packet, PRN  potassium, sodium phosphates, 2 packet, PRN      Today I personally reviewed pertinent medications, lines/drains/airways, imaging, laboratory results, notably:  TCDs 1/12 negative for spasm    1/13 CTH w maturing SAH and IVH, stable  I/O -3L  UOP 7.9L over 24h  WBC 14 from 16    Diet  Diet Adult Regular (IDDSI Level 7)  Diet Adult Regular (IDDSI Level 7)

## 2024-01-14 NOTE — PLAN OF CARE
"[facility] Care Plan    POC reviewed with Loreto Goff and family at 0300. Pt verbalized understanding. Questions and concerns addressed. No acute events overnight. Pt progressing toward goals. Will continue to monitor. See below and flowsheets for full assessment and VS info.     -around the clock dosing utilized for pain management, pain persists at a 10/10  -migraine cocktail given       Is this a stroke patient? yes- Stroke booklet reviewed with patient, risk factors identified for patient and stroke booklet remains at bedside for ongoing education.     Neuro:  [place] Coma Scale  Best Eye Response: 4-->(E4) spontaneous  Best Motor Response: 6-->(M6) obeys commands  Best Verbal Response: 5-->(V5) oriented  [place] Coma Scale Score: 15  Assessment Qualifiers: no eye obstruction present  Pupil PERRLA: yes     24hr Temp:  [98.4 °F (36.9 °C)-100.4 °F (38 °C)]     CV:   Rhythm: normal sinus rhythm  BP goals:   SBP < 180  MAP > 65    Resp:           Plan: N/A    GI/:     Diet/Nutrition Received: regular  Last Bowel Movement: 01/12/24  Voiding Characteristics: hesitancy    Intake/Output Summary (Last 24 hours) at 1/14/2024 0358  Last data filed at 1/14/2024 0305  Gross per 24 hour   Intake 3881.07 ml   Output 6560 ml   Net -2678.93 ml     Unmeasured Output  Urine Occurrence: 1  Stool Occurrence: 0  Pad Count: 3    Labs/Accuchecks:  Recent Labs   Lab 01/14/24  0055   WBC 14.36*   RBC 4.22   HGB 12.4   HCT 37.4         Recent Labs   Lab 01/14/24  0055      K 4.1   CO2 24      BUN 8   CREATININE 0.6   ALKPHOS 87   ALT 61*   AST 35   BILITOT 0.4      Recent Labs   Lab 01/11/24  0552   INR 1.0   APTT 24.0    No results for input(s): "CPK", "CPKMB", "TROPONINI", "MB" in the last 168 hours.    Electrolytes: N/A - electrolytes WDL  Accuchecks: Q6H    Gtts:      LDA/Wounds:  Lines/Drains/Airways       Arterial Line  Duration             Arterial Line 01/11/24 1203 Left Radial 2 days              Peripheral " Intravenous Line  Duration                  Peripheral IV - Single Lumen 01/12/24 0500 18 G Anterior;Distal;Right Upper Arm 1 day         Peripheral IV - Single Lumen 01/13/24 1824 20 G Anterior;Left Upper Arm <1 day                  Wounds: No  Wound care consulted: No

## 2024-01-14 NOTE — ASSESSMENT & PLAN NOTE
57 y/o presents to [facility] with thunderclap HA. Per [facility], reportedly no acute intracranial abnormality on CT head, though upon review at bedside, area of questionable hyperdensity with A Comm aneurysm on CTA. LP performed at [facility] with reported xanthochromia, though not documented on paper chart. Further LP results from [facility] as follows CSF color, red; ROSEMARY CSF, bloody; CSF volume, 14; WBC, 31; RBC 51,000; Protein 64.8; Glucose 73; Gram stain without organisms seen.    -Admit to [facility]  -NSGY, VN consulted  -q1h neuro checks, vital checks  -EKG, ECHO, CXR  -A1c, TSH, lipid panel, coag panel  -Daily CBC, CMP, mag, phos  -SBP < 180, prn labetalol and hydralazine  -SCDs, hold DVT chemoppx in acute period   -Keppra 500 bid  -Nimotop 60q4h  -PT/OT  - MRI/MRA w/wo with vessel wall imaging   - s/p coiling on 01/11  - 1/12 TCDs w mildly elevated velocities not meeting criteria for spasm, on nimotop  - 1/13 CTH w maturing SAH and IVH, overall stable  - Increased lyrica 225 BID   - SCDs; lovenox  - Fludrocortisone 100 BID  - Goal euvolemia to slight net positive   - IVF boluses to match I/O  - Follow up on TCDs

## 2024-01-14 NOTE — PROGRESS NOTES
[facility] - Neuro Critical Care  Neurocritical Care  Progress Note    Admit Date: 1/10/2024  Service Date: 01/14/2024  Length of Stay: 4    Subjective:     Chief Complaint: Anterior communicating artery aneurysm    History of Present Illness: Ms. Loreto Goff is a 59 y/o F with a PMHx of depression and hypothyroidism who presents to [facility] with thunderclap HA. [facility] to [facility], but states that her symtoms consisting of neck pain radiating the front of her head, photophobia, and R leg numbness started on 01/05.  No acute intracranial abnormality on CT reported from [facility], though questionable area of hyperdensity upon review of disc at [facility].  A Comm aneurysm on CTA. LP performed at [facility] with reported xanthochromia. Further LP results from [facility] as follows CSF color, red; ROSEMARY CSF, bloody; CSF volume, 14; WBC, 31; RBC 51,000; Protein 64.8; Glucose 73; Gram stain without organisms seen. She will be admitted to [facility] for hourly neuro-monitoring and a higher level of care.       Hospital Course: 01/11: persistent headache, with sats in low 90s, use decadron rather then dilaudid, awaiting angio on cardene for sbp control   01/12: has bilateral 6th nerve palsy today which is more apparent, head CT unchanged, was difficult to examine yesterday due to presence of narcotics and headache  01/13/2024 Started on silodosin overnight for urinary retention requiring I&O cath. Worsening HA w transient L sided numbness overnight. CTH stable. TCDs pending. Persistent 10/10 HA. Gabapentin changed to lyrica 150 BID. 500NS bolus given for euvolemia.   01/14/2024 Improved HA overnight to 7/10, 10/10 again this AM, Increased lyrica to 225BID. Remains net -3L this AM despite aggressive fluid replacement overnight of 3L total, high UOP. Given 1L NS and started on fludrocortisone 100BID. Will reassess volume status at noon for further replacement. TCDs pending.    Interval History:      Review of Systems   Constitutional:   Negative for fever.   HENT:  Negative for trouble swallowing.    Eyes:  Positive for photophobia.   Respiratory:  Negative for shortness of breath.    Cardiovascular:  Negative for chest pain.   Gastrointestinal:  Negative for abdominal pain.   Endocrine: Negative.    Genitourinary: Negative.  Negative for difficulty urinating.   Musculoskeletal:  Positive for neck pain.   Skin: Negative.    Allergic/Immunologic: Negative.    Neurological:  Positive for headaches.   Hematological: Negative.    Psychiatric/Behavioral: Negative.         Objective:     Vitals:  Temp: 99.8 °F (37.7 °C)  Pulse: 92  Rhythm: normal sinus rhythm  BP: (!) 163/70  MAP (mmHg): 101  Resp: 16  SpO2: (!) 93 %    Temp  Min: 98.4 °F (36.9 °C)  Max: 100.4 °F (38 °C)  Pulse  Min: 66  Max: 100  BP  Min: 123/60  Max: 206/92  MAP (mmHg)  Min: 84  Max: 132  Resp  Min: 14  Max: 37  SpO2  Min: 90 %  Max: 100 %    01/13 0701 - 01/14 0700  In: 4828.1 [P.O.:475; I.V.:2854.1]  Out: 7935 [Urine:7935]   Unmeasured Output  Urine Occurrence: 1  Stool Occurrence: 0  Pad Count: 3        Physical Exam  Vitals and nursing note reviewed.   Constitutional:       General: She is in acute distress.      Comments: Awake and lying flat in bed with blanket over eyes   HENT:      Head: Normocephalic and atraumatic.      Right Ear: External ear normal.      Left Ear: External ear normal.      Nose: Nose normal.      Mouth/Throat:      Mouth: Mucous membranes are dry.      Pharynx: Oropharynx is clear.   Eyes:      Extraocular Movements: Extraocular movements intact.      Pupils: Pupils are equal, round, and reactive to light.   Neck:      Comments: Neck pain, improved w lying flat w/o pillow  Cardiovascular:      Rate and Rhythm: Normal rate.      Pulses: Normal pulses.   Pulmonary:      Effort: Pulmonary effort is normal. No respiratory distress.   Abdominal:      Palpations: Abdomen is soft.   Skin:     General: Skin is warm.      Capillary Refill: Capillary refill takes  less than 2 seconds.   Neurological:      Comments: E4 V5 M6  Awake, eyes mostly closed 2/2 photophobia but does spontaneously open. Oriented x3. Speech fluent- no dysarthria or aphasia. Following commands. Reduced participation 2/2 HA.  CN II-XII grossly intact, specifically:  PERRL. EOMI.   No facial asymmetry.    Motor:  Moving all extremities spontaneously, antigravity, and to command  Generalized weakness throughout 4+/5 due to HA  Sensation intact x4              Medications:  Continuous Scheduledenoxparin, 40 mg, Q24H (prophylaxis, 1700)  fludrocortisone, 100 mcg, BID  levETIRAcetam, 500 mg, BID  levothyroxine, 25 mcg, Before breakfast  LIDOcaine, 1 patch, Q24H  methocarbamoL, 500 mg, QID  mupirocin, , BID  niMODipine, 60 mg, Q4H  pregabalin, 225 mg, BID  senna-docusate 8.6-50 mg, 1 tablet, Daily    PRNacetaminophen, 1,000 mg, Q8H PRN  dextrose 10%, 12.5 g, PRN  dextrose 10%, 25 g, PRN  glucagon (human recombinant), 1 mg, PRN  hydrALAZINE, 10 mg, Q4H PRN  insulin aspart U-100, 0-10 Units, Q6H PRN  labetalol, 10 mg, Q4H PRN  magnesium oxide, 800 mg, PRN  magnesium oxide, 800 mg, PRN  morphine, 2 mg, Q2H PRN  ondansetron, 4 mg, Q6H PRN  oxyCODONE, 5 mg, Q6H PRN  potassium bicarbonate, 35 mEq, PRN  potassium bicarbonate, 50 mEq, PRN  potassium bicarbonate, 60 mEq, PRN  potassium, sodium phosphates, 2 packet, PRN  potassium, sodium phosphates, 2 packet, PRN  potassium, sodium phosphates, 2 packet, PRN      Today I personally reviewed pertinent medications, lines/drains/airways, imaging, laboratory results, notably:  TCDs 1/12 negative for spasm    1/13 CTH w maturing SAH and IVH, stable  I/O -3L  UOP 7.9L over 24h  WBC 14 from 16    Diet  Diet Adult Regular (IDDSI Level 7)  Diet Adult Regular (IDDSI Level 7)        Assessment/Plan:     Neuro  * Anterior communicating artery aneurysm  59 y/o presents to [facility] with thunderclap HA. Per [facility], reportedly no acute intracranial abnormality on CT head, though upon review at  bedside, area of questionable hyperdensity with A Comm aneurysm on CTA. LP performed at [facility] with reported xanthochromia, though not documented on paper chart. Further LP results from [facility] as follows CSF color, red; ROSEMARY CSF, bloody; CSF volume, 14; WBC, 31; RBC 51,000; Protein 64.8; Glucose 73; Gram stain without organisms seen.    -Admit to NCC  -NSGY, VN consulted  -q1h neuro checks, vital checks  -EKG, ECHO, CXR  -A1c, TSH, lipid panel, coag panel  -Daily CBC, CMP, mag, phos  -SBP < 180, prn labetalol and hydralazine  -SCDs, hold DVT chemoppx in acute period   -Keppra 500 bid  -Nimotop 60q4h  -PT/OT  - MRI/MRA w/wo with vessel wall imaging   - s/p coiling on 01/11  - 1/12 TCDs w mildly elevated velocities not meeting criteria for spasm, on nimotop  - 1/13 CTH w maturing SAH and IVH, overall stable  - Increased lyrica 225 BID   - SCDs; lovenox  - Fludrocortisone 100 BID  - Goal euvolemia to slight net positive   - IVF boluses to match I/O  - Follow up on TCDs    Thunderclap headache  Pain control as needed  See primary problem     Psychiatric  Recurrent major depression in partial remission  Previously on Trintellix per paper chart  Confirm if patient is still taking and resume as appropriate    Pulmonary  Mild intermittent asthma without complication  PRN duonebs    Cardiac/Vascular  Essential hypertension  -180mmHg  - SBP within parameters, requires prns occasionally when pain level increases      Renal/  Urinary retention  Started on silodosin 4mg for urinary retention requiring I&O cath w 1.2L urine. Later found to be able to urinate on own but preferred straight cath as HA was too severe to get out of bed to bedside commode  Discontinued silodosin    Endocrine  Acquired hypothyroidism  Resume home synthroid   Known history or thyroid cyst/goiter          The patient is being Prophylaxed for:  Venous Thromboembolism with: Mechanical or Chemical  Stress Ulcer with: Not Applicable   Ventilator Pneumonia  with: not applicable    Activity Orders            Diet Adult Regular (IDDSI Level 7): Regular starting at 01/11 1950    Turn patient starting at 01/10 2200    Elevate HOB starting at 01/10 2156          Full Code    Critical care time spent on the evaluation and treatment of severe organ dysfunction, review of pertinent labs and imaging studies, discussions with consulting providers and discussions with patient/family: 35 minutes.    Max Dietrich PA-C, [facility]  Neurocritical Care  [facility] - Neuro Critical Care

## 2024-01-15 PROBLEM — E87.1 HYPONATREMIA: Status: ACTIVE | Noted: 2024-01-15

## 2024-01-15 LAB
ALBUMIN SERPL BCP-MCNC: 3.1 G/DL (ref 3.5–5.2)
ALP SERPL-CCNC: 81 U/L (ref 55–135)
ALT SERPL W/O P-5'-P-CCNC: 45 U/L (ref 10–44)
ANION GAP SERPL CALC-SCNC: 9 MMOL/L (ref 8–16)
AST SERPL-CCNC: 25 U/L (ref 10–40)
BASOPHILS # BLD AUTO: 0.04 K/UL (ref 0–0.2)
BASOPHILS NFR BLD: 0.3 % (ref 0–1.9)
BILIRUB SERPL-MCNC: 0.3 MG/DL (ref 0.1–1)
BUN SERPL-MCNC: 6 MG/DL (ref 6–20)
CALCIUM SERPL-MCNC: 8.7 MG/DL (ref 8.7–10.5)
CHLORIDE SERPL-SCNC: 99 MMOL/L (ref 95–110)
CO2 SERPL-SCNC: 24 MMOL/L (ref 23–29)
CREAT SERPL-MCNC: 0.6 MG/DL (ref 0.5–1.4)
DIFFERENTIAL METHOD BLD: ABNORMAL
EOSINOPHIL # BLD AUTO: 0 K/UL (ref 0–0.5)
EOSINOPHIL NFR BLD: 0.3 % (ref 0–8)
ERYTHROCYTE [DISTWIDTH] IN BLOOD BY AUTOMATED COUNT: 13.4 % (ref 11.5–14.5)
EST. GFR  (NO RACE VARIABLE): >60 ML/MIN/1.73 M^2
GLUCOSE SERPL-MCNC: 200 MG/DL (ref 70–110)
HCT VFR BLD AUTO: 36.2 % (ref 37–48.5)
HGB BLD-MCNC: 11.9 G/DL (ref 12–16)
IMM GRANULOCYTES # BLD AUTO: 0.06 K/UL (ref 0–0.04)
IMM GRANULOCYTES NFR BLD AUTO: 0.4 % (ref 0–0.5)
LYMPHOCYTES # BLD AUTO: 1.8 K/UL (ref 1–4.8)
LYMPHOCYTES NFR BLD: 12 % (ref 18–48)
MAGNESIUM SERPL-MCNC: 1.8 MG/DL (ref 1.6–2.6)
MCH RBC QN AUTO: 28.3 PG (ref 27–31)
MCHC RBC AUTO-ENTMCNC: 32.9 G/DL (ref 32–36)
MCV RBC AUTO: 86 FL (ref 82–98)
MONOCYTES # BLD AUTO: 0.9 K/UL (ref 0.3–1)
MONOCYTES NFR BLD: 6.1 % (ref 4–15)
NEUTROPHILS # BLD AUTO: 12.3 K/UL (ref 1.8–7.7)
NEUTROPHILS NFR BLD: 80.9 % (ref 38–73)
NRBC BLD-RTO: 0 /100 WBC
PHOSPHATE SERPL-MCNC: 2.6 MG/DL (ref 2.7–4.5)
PLATELET # BLD AUTO: 279 K/UL (ref 150–450)
PMV BLD AUTO: 11 FL (ref 9.2–12.9)
POCT GLUCOSE: 130 MG/DL (ref 70–110)
POTASSIUM SERPL-SCNC: 3.3 MMOL/L (ref 3.5–5.1)
PROT SERPL-MCNC: 6.5 G/DL (ref 6–8.4)
RBC # BLD AUTO: 4.21 M/UL (ref 4–5.4)
SODIUM SERPL-SCNC: 132 MMOL/L (ref 136–145)
SODIUM SERPL-SCNC: 136 MMOL/L (ref 136–145)
SODIUM SERPL-SCNC: 137 MMOL/L (ref 136–145)
SODIUM SERPL-SCNC: 142 MMOL/L (ref 136–145)
WBC # BLD AUTO: 15.14 K/UL (ref 3.9–12.7)

## 2024-01-15 PROCEDURE — 25000003 PHARM REV CODE 250: Performed by: PHYSICIAN ASSISTANT

## 2024-01-15 PROCEDURE — 63600175 PHARM REV CODE 636 W HCPCS: Performed by: PHYSICIAN ASSISTANT

## 2024-01-15 PROCEDURE — 94761 N-INVAS EAR/PLS OXIMETRY MLT: CPT

## 2024-01-15 PROCEDURE — 27000221 HC OXYGEN, UP TO 24 HOURS

## 2024-01-15 PROCEDURE — 85025 COMPLETE CBC W/AUTO DIFF WBC: CPT

## 2024-01-15 PROCEDURE — 99900035 HC TECH TIME PER 15 MIN (STAT)

## 2024-01-15 PROCEDURE — 80053 COMPREHEN METABOLIC PANEL: CPT

## 2024-01-15 PROCEDURE — 25000003 PHARM REV CODE 250

## 2024-01-15 PROCEDURE — 97530 THERAPEUTIC ACTIVITIES: CPT

## 2024-01-15 PROCEDURE — 97116 GAIT TRAINING THERAPY: CPT

## 2024-01-15 PROCEDURE — 84295 ASSAY OF SERUM SODIUM: CPT | Mod: 91

## 2024-01-15 PROCEDURE — 63600175 PHARM REV CODE 636 W HCPCS

## 2024-01-15 PROCEDURE — 83735 ASSAY OF MAGNESIUM: CPT

## 2024-01-15 PROCEDURE — 25000003 PHARM REV CODE 250: Performed by: PSYCHIATRY & NEUROLOGY

## 2024-01-15 PROCEDURE — 99233 SBSQ HOSP IP/OBS HIGH 50: CPT | Mod: ,,, | Performed by: PSYCHIATRY & NEUROLOGY

## 2024-01-15 PROCEDURE — 84100 ASSAY OF PHOSPHORUS: CPT

## 2024-01-15 PROCEDURE — 63600175 PHARM REV CODE 636 W HCPCS: Performed by: PSYCHIATRY & NEUROLOGY

## 2024-01-15 PROCEDURE — 25000003 PHARM REV CODE 250: Performed by: NURSE PRACTITIONER

## 2024-01-15 PROCEDURE — 20000000 HC ICU ROOM

## 2024-01-15 RX ORDER — HYDRALAZINE HYDROCHLORIDE 20 MG/ML
10 INJECTION INTRAMUSCULAR; INTRAVENOUS EVERY 4 HOURS PRN
Status: DISCONTINUED | OUTPATIENT
Start: 2024-01-15 | End: 2024-01-19

## 2024-01-15 RX ORDER — INSULIN ASPART 100 [IU]/ML
0-10 INJECTION, SOLUTION INTRAVENOUS; SUBCUTANEOUS
Status: DISCONTINUED | OUTPATIENT
Start: 2024-01-15 | End: 2024-01-18

## 2024-01-15 RX ORDER — LABETALOL HCL 20 MG/4 ML
10 SYRINGE (ML) INTRAVENOUS EVERY 4 HOURS PRN
Status: DISCONTINUED | OUTPATIENT
Start: 2024-01-15 | End: 2024-01-19

## 2024-01-15 RX ORDER — SODIUM CHLORIDE 1 G/1
2000 TABLET ORAL 3 TIMES DAILY
Status: DISCONTINUED | OUTPATIENT
Start: 2024-01-15 | End: 2024-01-24 | Stop reason: HOSPADM

## 2024-01-15 RX ADMIN — SENNOSIDES AND DOCUSATE SODIUM 1 TABLET: 50; 8.6 TABLET ORAL at 09:01

## 2024-01-15 RX ADMIN — MORPHINE SULFATE 2 MG: 2 INJECTION, SOLUTION INTRAMUSCULAR; INTRAVENOUS at 12:01

## 2024-01-15 RX ADMIN — ENOXAPARIN SODIUM 40 MG: 100 INJECTION SUBCUTANEOUS at 05:01

## 2024-01-15 RX ADMIN — METHOCARBAMOL 500 MG: 500 TABLET ORAL at 02:01

## 2024-01-15 RX ADMIN — NICARDIPINE HYDROCHLORIDE 2.5 MG/HR: 0.2 INJECTION, SOLUTION INTRAVENOUS at 06:01

## 2024-01-15 RX ADMIN — SODIUM CHLORIDE 2000 MG: 1 TABLET ORAL at 09:01

## 2024-01-15 RX ADMIN — FLUDROCORTISONE ACETATE 100 MCG: 0.1 TABLET ORAL at 10:01

## 2024-01-15 RX ADMIN — NIMODIPINE 60 MG: 30 CAPSULE, LIQUID FILLED ORAL at 06:01

## 2024-01-15 RX ADMIN — METHOCARBAMOL 500 MG: 500 TABLET ORAL at 10:01

## 2024-01-15 RX ADMIN — METHOCARBAMOL 500 MG: 500 TABLET ORAL at 05:01

## 2024-01-15 RX ADMIN — OXYCODONE HYDROCHLORIDE 5 MG: 5 TABLET ORAL at 03:01

## 2024-01-15 RX ADMIN — MORPHINE SULFATE 2 MG: 2 INJECTION, SOLUTION INTRAMUSCULAR; INTRAVENOUS at 06:01

## 2024-01-15 RX ADMIN — NIMODIPINE 60 MG: 30 CAPSULE, LIQUID FILLED ORAL at 05:01

## 2024-01-15 RX ADMIN — POTASSIUM & SODIUM PHOSPHATES POWDER PACK 280-160-250 MG 2 PACKET: 280-160-250 PACK at 06:01

## 2024-01-15 RX ADMIN — POTASSIUM BICARBONATE 35 MEQ: 391 TABLET, EFFERVESCENT ORAL at 02:01

## 2024-01-15 RX ADMIN — NIMODIPINE 60 MG: 30 CAPSULE, LIQUID FILLED ORAL at 10:01

## 2024-01-15 RX ADMIN — NIMODIPINE 60 MG: 30 CAPSULE, LIQUID FILLED ORAL at 02:01

## 2024-01-15 RX ADMIN — FLUDROCORTISONE ACETATE 100 MCG: 0.1 TABLET ORAL at 09:01

## 2024-01-15 RX ADMIN — MUPIROCIN: 20 OINTMENT TOPICAL at 09:01

## 2024-01-15 RX ADMIN — SODIUM CHLORIDE 2000 MG: 1 TABLET ORAL at 10:01

## 2024-01-15 RX ADMIN — OXYCODONE HYDROCHLORIDE 5 MG: 5 TABLET ORAL at 10:01

## 2024-01-15 RX ADMIN — PREGABALIN 225 MG: 150 CAPSULE ORAL at 10:01

## 2024-01-15 RX ADMIN — LEVOTHYROXINE SODIUM 25 MCG: 25 TABLET ORAL at 06:01

## 2024-01-15 RX ADMIN — POTASSIUM & SODIUM PHOSPHATES POWDER PACK 280-160-250 MG 2 PACKET: 280-160-250 PACK at 01:01

## 2024-01-15 RX ADMIN — PREGABALIN 225 MG: 150 CAPSULE ORAL at 09:01

## 2024-01-15 RX ADMIN — LIDOCAINE 1 PATCH: 50 PATCH TOPICAL at 02:01

## 2024-01-15 RX ADMIN — POTASSIUM BICARBONATE 35 MEQ: 391 TABLET, EFFERVESCENT ORAL at 04:01

## 2024-01-15 RX ADMIN — POTASSIUM BICARBONATE 35 MEQ: 391 TABLET, EFFERVESCENT ORAL at 06:01

## 2024-01-15 RX ADMIN — SODIUM CHLORIDE: 9 INJECTION, SOLUTION INTRAVENOUS at 04:01

## 2024-01-15 RX ADMIN — METHOCARBAMOL 500 MG: 500 TABLET ORAL at 09:01

## 2024-01-15 RX ADMIN — LEVETIRACETAM 500 MG: 500 TABLET, FILM COATED ORAL at 09:01

## 2024-01-15 RX ADMIN — SODIUM CHLORIDE 2000 MG: 1 TABLET ORAL at 02:01

## 2024-01-15 RX ADMIN — ACETAMINOPHEN 1000 MG: 500 TABLET ORAL at 10:01

## 2024-01-15 NOTE — SUBJECTIVE & OBJECTIVE
Interval History: exam stable, c/o photophobia    Medications:  Continuous Infusions:   sodium chloride 0.9% 200 mL/hr at 01/14/24 2305    niCARdipine Stopped (01/14/24 2303)     Scheduled Meds:   enoxparin  40 mg Subcutaneous Q24H (prophylaxis, 1700)    fludrocortisone  100 mcg Oral BID    levETIRAcetam  500 mg Oral BID    levothyroxine  25 mcg Oral Before breakfast    LIDOcaine  1 patch Transdermal Q24H    methocarbamoL  500 mg Oral QID    mupirocin   Nasal BID    niMODipine  60 mg Oral Q4H    pregabalin  225 mg Oral BID    senna-docusate 8.6-50 mg  1 tablet Oral Daily     PRN Meds:acetaminophen, dextrose 10%, dextrose 10%, glucagon (human recombinant), hydrALAZINE, insulin aspart U-100, labetalol, magnesium oxide, magnesium oxide, morphine, ondansetron, oxyCODONE, potassium bicarbonate, potassium bicarbonate, potassium bicarbonate, potassium, sodium phosphates, potassium, sodium phosphates, potassium, sodium phosphates     Review of Systems  Objective:     Weight: 97.1 kg (214 lb)  Body mass index is 33.52 kg/m².  Vital Signs (Most Recent):  Temp: 99.1 °F (37.3 °C) (01/14/24 2305)  Pulse: 82 (01/14/24 2305)  Resp: (!) 22 (01/14/24 2305)  BP: (!) 148/65 (01/14/24 2305)  SpO2: (!) 93 % (01/14/24 2305) Vital Signs (24h Range):  Temp:  [98.4 °F (36.9 °C)-99.8 °F (37.7 °C)] 99.1 °F (37.3 °C)  Pulse:  [] 82  Resp:  [14-41] 22  SpO2:  [90 %-100 %] 93 %  BP: (123-202)/(59-85) 148/65     Date 01/14/24 0700 - 01/15/24 0659   Shift 3585-7866 9519-9046 2425-4741 24 Hour Total   INTAKE   I.V.(mL/kg)  1013.3(10.4) 211.8(2.2) 1225.1(12.6)   IV Piggyback 910.8 1915.3  2826.1   Shift Total(mL/kg) 910.8(9.4) 2928.6(30.2) 211.8(2.2) 4051.2(41.7)   OUTPUT   Urine(mL/kg/hr) 1750(2.3) 2050(2.6)  3800   Shift Total(mL/kg) 1750(18) 2050(21.1)  3800(39.1)   Weight (kg) 97.1 97.1 97.1 97.1                               Physical Exam         Neurosurgery Physical Exam    Constitutional:       Appearance: She is well-developed and  "well-nourished.   Eyes:      Extraocular Movements: EOM normal.      Conjunctiva/sclera: Conjunctivae normal.      Pupils: Pupils are equal, round, and reactive to light.   Cardiovascular:      Pulses: Normal pulses.   Abdominal:      Palpations: Abdomen is soft.   Neurological:      Mental Status: She is alert and oriented to person, place, and time.      Comments: E4V5M6  AAOx3  PERRL  Cranial nerves intact  Vision grossly normal, +photophobia  Patient follows commands all extremities; noncompliant with full exam  At least 4/5 strength in all extremities; non focal   SILT  DTRs normal     Significant Labs:  Recent Labs   Lab 01/13/24  0301 01/14/24  0055 01/14/24  1804    145*  --     138 138   K 3.2* 4.1  --     102  --    CO2 25 24  --    BUN 10 8  --    CREATININE 0.6 0.6  --    CALCIUM 8.4* 9.2  --    MG 1.8 2.0  --      Recent Labs   Lab 01/13/24  0301 01/14/24  0055   WBC 16.95* 14.36*   HGB 11.2* 12.4   HCT 35.2* 37.4    261     No results for input(s): "LABPT", "INR", "APTT" in the last 48 hours.  Microbiology Results (last 7 days)       ** No results found for the last 168 hours. **          All pertinent labs from the last 24 hours have been reviewed.    Significant Diagnostics:  CT: No results found in the last 24 hours.  MRI: No results found in the last 24 hours.  "

## 2024-01-15 NOTE — SUBJECTIVE & OBJECTIVE
Interval History: 1/15 naeon, exam stable. Conitnue SAH protocol    Medications:  Continuous Infusions:   sodium chloride 0.9% 75 mL/hr at 01/15/24 0901    niCARdipine 2.5 mg/hr (01/15/24 0901)     Scheduled Meds:   enoxparin  40 mg Subcutaneous Q24H (prophylaxis, 1700)    fludrocortisone  100 mcg Oral BID    levETIRAcetam  500 mg Oral BID    levothyroxine  25 mcg Oral Before breakfast    LIDOcaine  1 patch Transdermal Q24H    methocarbamoL  500 mg Oral QID    mupirocin   Nasal BID    niMODipine  60 mg Oral Q4H    pregabalin  225 mg Oral BID    senna-docusate 8.6-50 mg  1 tablet Oral Daily    sodium chloride  2,000 mg Oral TID     PRN Meds:acetaminophen, dextrose 10%, dextrose 10%, glucagon (human recombinant), hydrALAZINE, insulin aspart U-100, labetalol, magnesium oxide, magnesium oxide, morphine, ondansetron, oxyCODONE, potassium bicarbonate, potassium bicarbonate, potassium bicarbonate, potassium, sodium phosphates, potassium, sodium phosphates, potassium, sodium phosphates     Review of Systems  Objective:     Weight: 97.1 kg (214 lb)  Body mass index is 33.52 kg/m².  Vital Signs (Most Recent):  Temp: 99.1 °F (37.3 °C) (01/15/24 0701)  Pulse: 87 (01/15/24 0901)  Resp: (!) 21 (01/15/24 0901)  BP: (!) 160/73 (01/15/24 0901)  SpO2: (!) 93 % (01/15/24 0901) Vital Signs (24h Range):  Temp:  [98.7 °F (37.1 °C)-99.2 °F (37.3 °C)] 99.1 °F (37.3 °C)  Pulse:  [] 87  Resp:  [12-41] 21  SpO2:  [91 %-97 %] 93 %  BP: (122-202)/(57-85) 160/73     Date 01/15/24 0700 - 01/16/24 0659   Shift 2230-2759 8972-7343 6656-8467 24 Hour Total   INTAKE   I.V.(mL/kg) 242.4(2.5)   242.4(2.5)   Shift Total(mL/kg) 242.4(2.5)   242.4(2.5)   OUTPUT   Shift Total(mL/kg)       Weight (kg) 97.1 97.1 97.1 97.1                                 Physical Exam         Neurosurgery Physical Exam    Constitutional:       Appearance: She is well-developed and well-nourished.   Eyes:      Extraocular Movements: EOM normal.      Conjunctiva/sclera:  "Conjunctivae normal.      Pupils: Pupils are equal, round, and reactive to light.   Cardiovascular:      Pulses: Normal pulses.   Abdominal:      Palpations: Abdomen is soft.   Neurological:      Mental Status: She is alert and oriented to person, place, and time.      Comments: E4V5M6  AAOx3  PERRL  Cranial nerves intact  Vision grossly normal, +photophobia  Patient follows commands all extremities; noncompliant with full exam  At least 4/5 strength in all extremities; non focal   SILT  DTRs normal     Significant Labs:  Recent Labs   Lab 01/14/24  0055 01/14/24  1804 01/15/24  0019 01/15/24  0611   *  --  200*  --     138 132* 137   K 4.1  --  3.3*  --      --  99  --    CO2 24  --  24  --    BUN 8  --  6  --    CREATININE 0.6  --  0.6  --    CALCIUM 9.2  --  8.7  --    MG 2.0  --  1.8  --        Recent Labs   Lab 01/14/24  0055 01/15/24  0019   WBC 14.36* 15.14*   HGB 12.4 11.9*   HCT 37.4 36.2*    279       No results for input(s): "LABPT", "INR", "APTT" in the last 48 hours.  Microbiology Results (last 7 days)       ** No results found for the last 168 hours. **          All pertinent labs from the last 24 hours have been reviewed.    Significant Diagnostics:  CT: No results found in the last 24 hours.  MRI: No results found in the last 24 hours.  "

## 2024-01-15 NOTE — ASSESSMENT & PLAN NOTE
57 yo F with PMH of hypothyroidism who presents as transfer from [facility] due to thunderclap headache that started suddenly on Friday, 6 days ago. She reports her headache has worsened more and more over the last week so she came to the hospital. She denies any illicit drug use or blood thinners. She reports her headache is a 10 out of 10.     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   Per report there was xanthochromia on LP performed at OSH on 11/10/24.     Presumably post bleed day 8  Coil embolization of Acomm 1/11    - admitted to [facility], q1h neuro checks  - SAH protocol // vasospasm watch   - keppra for seizure ppx   - Na > 135   - nimotop x21 days   - TCDs x14 days   - SBP < 180   - on room air, ctm  - strict I/O's; keep euvolemic to 1L net positive  - minimize narcotics for HA control  - hold any blood thinners  - rest of care per NCC; nsgy will continue to follow

## 2024-01-15 NOTE — PT/OT/SLP PROGRESS
Physical Therapy Treatment    Patient Name:  Loreto Goff   MRN:  [id number]  Admitting Diagnosis: Anterior communicating artery aneurysm  Recent Surgery: * No surgery found *      Recommendations:     Discharge Recommendations:  High intensity therapy at discharge    Discharge Equipment Recommendations:  (TBD pending progress)  Barriers to discharge: None    Assessment:     Loreto Goff is a 58 y.o. female admitted with a medical diagnosis of Anterior communicating artery aneurysm. Patient tolerated PT treatment well today. She is most limited today by headache.  She was able to practice bed mobility, standing, and transfers. She was still limited by her pain, but was able to participate more today compared to previous session. Focus of treatment was improving activity tolerance. Patient is progressing well. Patient continues to demonstrate progression to warrant high intensity therapy evidenced by objectives noted below.  See detailed treatment note below:    Problem List: weakness, impaired endurance, impaired self care skills, impaired functional mobility, gait instability, impaired balance, decreased safety awareness, pain, impaired cardiopulmonary response to activity. weakness, impaired endurance, impaired self care skills, impaired functional mobility, gait instability, impaired balance, decreased safety awareness, pain, impaired cardiopulmonary response to activity  Rehab Prognosis: Good     GOALS:   Multidisciplinary Problems       Physical Therapy Goals          Problem: Physical Therapy    Goal Priority Disciplines Outcome Goal Variances Interventions   Physical Therapy Goal     PT, PT/OT Ongoing, Progressing     Description: Goals to be met by: 24     Patient will increase functional independence with mobility by performin. Supine to sit with Modified [place]  2. Sit to supine with Modified [place]  3. Sit to stand transfer with Modified [place]  4. Bed to chair transfer with  "Stand-by Assistance using LRAD as needed  5. Gait  x 150 feet with Stand-by Assistance using LRAD as needed.   6. Ascend/descend 2 stair with no Handrails and Stand-by Assistance  7. Lower extremity exercise program x15 reps per handout, with assistance as needed                       Plan:     Goals for next session: walk     During this hospitalization, patient to be seen 4 x/week to address the listed problems via gait training, therapeutic activities, therapeutic exercises, neuromuscular re-education  Plan of Care Expires:  02/13/24  Plan of Care Reviewed with: patient    Subjective     Communicated with RN prior to session.  Patient found on bedside commode with nursing upon PT entry to room.   "I'm so dizzy"    Pain/Comfort:  Pain Rating 1: 10/10  Location - Orientation 1: generalized  Location 1: head  Pain Addressed 1: Reposition, Distraction, Cessation of Activity, Nurse notified  Pain Rating Post-Intervention 1: 10/10    Objective:     Patient found with: telemetry, pulse ox (continuous), blood pressure cuff, oxygen, peripheral IV   Mental Status: Patient is Alert and Cooperative during session.    General Precautions: Standard, fall, aspiration   Orthopedic Precautions:N/A   Braces: N/A   Respiratory Status: room air  Vital Signs (Most Recent):    Temp: 99.2 °F (37.3 °C) (01/15/24 1101)  Pulse: 85 (01/15/24 1101)  Resp: 16 (01/15/24 1101)  BP: 136/65 (01/15/24 1101)  SpO2: 98 % (01/15/24 1101)    Functional Mobility:  Bed Mobility:   Sit to Supine: stand by assistance with extra time    Sitting Balance at Edge of Bed:  Assistance Level Required: standy by assistance  Time: ~15 minutes  Postural deviations noted: posterior lean  Cueing provided for: upright and midline sitting posture    Transfers:   Sit <> Stand Transfer: minimum assistance with no assistive device   Bed <> Chair Transfer: Step Transfer technique with minimum assistance with no assistive device    Neuro Re-Education:   Patient provided " with the following neuro based interventions: verbal cueing and education for optimal posture, manual cueing and education for optimal posture, and reaching outside base of support to challenge balance    Education:  Patient was educated on the following:  Progress of PT goals and plan of care  In room safety and use of call button  Importance of continued upright mobility and exercise    Patient left supine with all lines intact, call button in reach, and RN notified.    AM-PAC 6 CLICK MOBILITY  Turning over in bed (including adjusting bedclothes, sheets and blankets)?: 3  Sitting down on and standing up from a chair with arms (e.g., wheelchair, bedside commode, etc.): 3  Moving from lying on back to sitting on the side of the bed?: 3  Moving to and from a bed to a chair (including a wheelchair)?: 3  Need to walk in hospital room?: 2  Climbing 3-5 steps with a railing?: 2  Basic Mobility Total Score: 16       Time Tracking:     PT Received On: 01/15/24  PT Start Time: 1020     PT Stop Time: 1046  PT Total Time (min): 26 min     Billable Minutes:   Therapeutic Activity 16 and Neuromuscular Re-education 10    Treatment Type: Treatment  PT/PTA: PT       Vijaya Donis, PT, DPT  01/15/2024

## 2024-01-15 NOTE — PLAN OF CARE
"[facility] Care Plan    POC reviewed with Loreto Goff and family at 1400. Pt verbalized understanding. Questions and concerns addressed. No acute events today. Pt progressing toward goals. Will continue to monitor. See below and flowsheets for full assessment and VS info.     - PRNs given for HA   - SBP < 220  - NS gtt continued at 75ml/hr  - Keppra discontinued   - ACHS accuchecks   - Q6hr sodiums        Is this a stroke patient? yes- Stroke booklet reviewed with patient and family, risk factors identified for patient and stroke booklet remains at bedside for ongoing education.     Neuro:  [place] Coma Scale  Best Eye Response: 4-->(E4) spontaneous  Best Motor Response: 6-->(M6) obeys commands  Best Verbal Response: 5-->(V5) oriented  [place] Coma Scale Score: 15  Assessment Qualifiers: no eye obstruction present  Pupil PERRLA: yes     24 hr Temp:  [98.7 °F (37.1 °C)-100 °F (37.8 °C)]     CV:   Rhythm: normal sinus rhythm  BP goals:   SBP < 220  MAP > 65    Resp:           Plan: N/A    GI/:     Diet/Nutrition Received: regular  Last Bowel Movement: 01/12/24  Voiding Characteristics: external catheter    Intake/Output Summary (Last 24 hours) at 1/15/2024 1641  Last data filed at 1/15/2024 1501  Gross per 24 hour   Intake 3693.4 ml   Output 3450 ml   Net 243.4 ml     Unmeasured Output  Urine Occurrence: 1  Stool Occurrence: 0  Pad Count: 3    Labs/Accuchecks:  Recent Labs   Lab 01/15/24  0019   WBC 15.14*   RBC 4.21   HGB 11.9*   HCT 36.2*         Recent Labs   Lab 01/15/24  0019 01/15/24  0611 01/15/24  1121   *   < > 136   K 3.3*  --   --    CO2 24  --   --    CL 99  --   --    BUN 6  --   --    CREATININE 0.6  --   --    ALKPHOS 81  --   --    ALT 45*  --   --    AST 25  --   --    BILITOT 0.3  --   --     < > = values in this interval not displayed.      Recent Labs   Lab 01/11/24  0552   INR 1.0   APTT 24.0    No results for input(s): "CPK", "CPKMB", "TROPONINI", "MB" in the last 168 " hours.    Electrolytes: No replacement orders  Accuchecks: ACHS    Gtts:   sodium chloride 0.9% 75 mL/hr at 01/15/24 1501    niCARdipine Stopped (01/15/24 1107)       LDA/Wounds:  Lines/Drains/Airways       Peripheral Intravenous Line  Duration                  Peripheral IV - Single Lumen 01/12/24 0500 18 G Anterior;Distal;Right Upper Arm 3 days         Peripheral IV - Single Lumen 01/13/24 1824 20 G Anterior;Left Upper Arm 1 day                  Wounds:        Wound care consulted: No

## 2024-01-15 NOTE — PROGRESS NOTES
[facility]  Neurosurgery  Progress Note    Subjective:     History of Present Illness: 57 yo F with PMH of hypothyroidism who presents as transfer from [place] due to thunderclap headache that started suddenly on Friday, 6 days ago. She reports her headache has worsened more and more over the last week so she came to the hospital. She denies any illicit drug use or blood thinners. She reports her headache is a 10 out of 10. 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 Per report there was xanthochromia on LP performed at OSH on 11/10/24. NSGY consulted for aneurysm.     Post-Op Info:  * No surgery found *       Interval History: 1/15 naeon, exam stable. Conitnue SAH protocol    Medications:  Continuous Infusions:   sodium chloride 0.9% 75 mL/hr at 01/15/24 0901    niCARdipine 2.5 mg/hr (01/15/24 0901)     Scheduled Meds:   enoxparin  40 mg Subcutaneous Q24H (prophylaxis, 1700)    fludrocortisone  100 mcg Oral BID    levETIRAcetam  500 mg Oral BID    levothyroxine  25 mcg Oral Before breakfast    LIDOcaine  1 patch Transdermal Q24H    methocarbamoL  500 mg Oral QID    mupirocin   Nasal BID    niMODipine  60 mg Oral Q4H    pregabalin  225 mg Oral BID    senna-docusate 8.6-50 mg  1 tablet Oral Daily    sodium chloride  2,000 mg Oral TID     PRN Meds:acetaminophen, dextrose 10%, dextrose 10%, glucagon (human recombinant), hydrALAZINE, insulin aspart U-100, labetalol, magnesium oxide, magnesium oxide, morphine, ondansetron, oxyCODONE, potassium bicarbonate, potassium bicarbonate, potassium bicarbonate, potassium, sodium phosphates, potassium, sodium phosphates, potassium, sodium phosphates     Review of Systems  Objective:     Weight: 97.1 kg (214 lb)  Body mass index is 33.52 kg/m².  Vital Signs (Most Recent):  Temp: 99.1 °F (37.3 °C) (01/15/24 0701)  Pulse: 87 (01/15/24 0901)  Resp: (!) 21 (01/15/24 0901)  BP: (!) 160/73 (01/15/24 0901)  SpO2: (!)  "93 % (01/15/24 0901) Vital Signs (24h Range):  Temp:  [98.7 °F (37.1 °C)-99.2 °F (37.3 °C)] 99.1 °F (37.3 °C)  Pulse:  [] 87  Resp:  [12-41] 21  SpO2:  [91 %-97 %] 93 %  BP: (122-202)/(57-85) 160/73     Date 01/15/24 0700 - 01/16/24 0659   Shift 2395-3624 5716-4448 5835-8438 24 Hour Total   INTAKE   I.V.(mL/kg) 242.4(2.5)   242.4(2.5)   Shift Total(mL/kg) 242.4(2.5)   242.4(2.5)   OUTPUT   Shift Total(mL/kg)       Weight (kg) 97.1 97.1 97.1 97.1                                Physical Exam         Neurosurgery Physical Exam    Constitutional:       Appearance: She is well-developed and well-nourished.   Eyes:      Extraocular Movements: EOM normal.      Conjunctiva/sclera: Conjunctivae normal.      Pupils: Pupils are equal, round, and reactive to light.   Cardiovascular:      Pulses: Normal pulses.   Abdominal:      Palpations: Abdomen is soft.   Neurological:      Mental Status: She is alert and oriented to person, place, and time.      Comments: E4V5M6  AAOx3  PERRL  Cranial nerves intact  Vision grossly normal, +photophobia  Patient follows commands all extremities; noncompliant with full exam  At least 4/5 strength in all extremities; non focal   SILT  DTRs normal     Significant Labs:  Recent Labs   Lab 01/14/24 0055 01/14/24  1804 01/15/24  0019 01/15/24  0611   *  --  200*  --     138 132* 137   K 4.1  --  3.3*  --      --  99  --    CO2 24  --  24  --    BUN 8  --  6  --    CREATININE 0.6  --  0.6  --    CALCIUM 9.2  --  8.7  --    MG 2.0  --  1.8  --        Recent Labs   Lab 01/14/24  0055 01/15/24  0019   WBC 14.36* 15.14*   HGB 12.4 11.9*   HCT 37.4 36.2*    279       No results for input(s): "LABPT", "INR", "APTT" in the last 48 hours.  Microbiology Results (last 7 days)       ** No results found for the last 168 hours. **          All pertinent labs from the last 24 hours have been reviewed.    Significant Diagnostics:  CT: No results found in the last 24 hours.  MRI: No " results found in the last 24 hours.  Assessment/Plan:     * Anterior communicating artery aneurysm  59 yo F with PMH of hypothyroidism who presents as transfer from [facility] due to thunderclap headache that started suddenly on Friday, 6 days ago. She reports her headache has worsened more and more over the last week so she came to the hospital. She denies any illicit drug use or blood thinners. She reports her headache is a 10 out of 10.     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   Per report there was xanthochromia on LP performed at OSH on 11/10/24.     Presumably post bleed day 8  Coil embolization of Acomm 1/11    - admitted to [facility], q1h neuro checks  - SAH protocol // vasospasm watch   - keppra for seizure ppx   - Na > 135   - nimotop x21 days   - TCDs x14 days   - SBP < 180   - on room air, ctm  - strict I/O's; keep euvolemic to 1L net positive  - minimize narcotics for HA control  - hold any blood thinners  - rest of care per NCC; nsgy will continue to follow        Lb Mcintosh MD  Neurosurgery  [facility]

## 2024-01-15 NOTE — PROGRESS NOTES
[facility]  Neurosurgery  Progress Note    Subjective:     History of Present Illness: 59 yo F with PMH of hypothyroidism who presents as transfer from [place] due to thunderclap headache that started suddenly on Friday, 6 days ago. She reports her headache has worsened more and more over the last week so she came to the hospital. She denies any illicit drug use or blood thinners. She reports her headache is a 10 out of 10. 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 Per report there was xanthochromia on LP performed at OSH on 11/10/24. NSGY consulted for aneurysm.     Post-Op Info:  * No surgery found *       Interval History: exam stable, c/o photophobia    Medications:  Continuous Infusions:   sodium chloride 0.9% 200 mL/hr at 01/14/24 2305    niCARdipine Stopped (01/14/24 2303)     Scheduled Meds:   enoxparin  40 mg Subcutaneous Q24H (prophylaxis, 1700)    fludrocortisone  100 mcg Oral BID    levETIRAcetam  500 mg Oral BID    levothyroxine  25 mcg Oral Before breakfast    LIDOcaine  1 patch Transdermal Q24H    methocarbamoL  500 mg Oral QID    mupirocin   Nasal BID    niMODipine  60 mg Oral Q4H    pregabalin  225 mg Oral BID    senna-docusate 8.6-50 mg  1 tablet Oral Daily     PRN Meds:acetaminophen, dextrose 10%, dextrose 10%, glucagon (human recombinant), hydrALAZINE, insulin aspart U-100, labetalol, magnesium oxide, magnesium oxide, morphine, ondansetron, oxyCODONE, potassium bicarbonate, potassium bicarbonate, potassium bicarbonate, potassium, sodium phosphates, potassium, sodium phosphates, potassium, sodium phosphates     Review of Systems  Objective:     Weight: 97.1 kg (214 lb)  Body mass index is 33.52 kg/m².  Vital Signs (Most Recent):  Temp: 99.1 °F (37.3 °C) (01/14/24 2305)  Pulse: 82 (01/14/24 2305)  Resp: (!) 22 (01/14/24 2305)  BP: (!) 148/65 (01/14/24 2305)  SpO2: (!) 93 % (01/14/24 2305) Vital Signs (24h Range):  Temp:   "[98.4 °F (36.9 °C)-99.8 °F (37.7 °C)] 99.1 °F (37.3 °C)  Pulse:  [] 82  Resp:  [14-41] 22  SpO2:  [90 %-100 %] 93 %  BP: (123-202)/(59-85) 148/65     Date 01/14/24 0700 - 01/15/24 0659   Shift 1743-6407 2477-0703 1609-0999 24 Hour Total   INTAKE   I.V.(mL/kg)  1013.3(10.4) 211.8(2.2) 1225.1(12.6)   IV Piggyback 910.8 1915.3  2826.1   Shift Total(mL/kg) 910.8(9.4) 2928.6(30.2) 211.8(2.2) 4051.2(41.7)   OUTPUT   Urine(mL/kg/hr) 1750(2.3) 2050(2.6)  3800   Shift Total(mL/kg) 1750(18) 2050(21.1)  3800(39.1)   Weight (kg) 97.1 97.1 97.1 97.1                               Physical Exam         Neurosurgery Physical Exam    Constitutional:       Appearance: She is well-developed and well-nourished.   Eyes:      Extraocular Movements: EOM normal.      Conjunctiva/sclera: Conjunctivae normal.      Pupils: Pupils are equal, round, and reactive to light.   Cardiovascular:      Pulses: Normal pulses.   Abdominal:      Palpations: Abdomen is soft.   Neurological:      Mental Status: She is alert and oriented to person, place, and time.      Comments: E4V5M6  AAOx3  PERRL  Cranial nerves intact  Vision grossly normal, +photophobia  Patient follows commands all extremities; noncompliant with full exam  At least 4/5 strength in all extremities; non focal   SILT  DTRs normal     Significant Labs:  Recent Labs   Lab 01/13/24  0301 01/14/24  0055 01/14/24  1804    145*  --     138 138   K 3.2* 4.1  --     102  --    CO2 25 24  --    BUN 10 8  --    CREATININE 0.6 0.6  --    CALCIUM 8.4* 9.2  --    MG 1.8 2.0  --      Recent Labs   Lab 01/13/24  0301 01/14/24  0055   WBC 16.95* 14.36*   HGB 11.2* 12.4   HCT 35.2* 37.4    261     No results for input(s): "LABPT", "INR", "APTT" in the last 48 hours.  Microbiology Results (last 7 days)       ** No results found for the last 168 hours. **          All pertinent labs from the last 24 hours have been reviewed.    Significant Diagnostics:  CT: No results found " in the last 24 hours.  MRI: No results found in the last 24 hours.  Assessment/Plan:     * Anterior communicating artery aneurysm  59 yo F with PMH of hypothyroidism who presents as transfer from [facility] due to thunderclap headache that started suddenly on Friday, 6 days ago. She reports her headache has worsened more and more over the last week so she came to the hospital. She denies any illicit drug use or blood thinners. She reports her headache is a 10 out of 10.     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   Per report there was xanthochromia on LP performed at OSH on 11/10/24.     Presumably post bleed day 8  Coil embolization of Acomm 1/11    - admitted to [facility], q1h neuro checks  - SAH protocol // vasospasm watch   - keppra for seizure ppx   - Na > 135   - nimotop x21 days   - TCDs x14 days   - SBP < 180   - on room air, ctm  - strict I/O's; keep euvolemic to 1L net positive  - minimize narcotics for HA control  - hold any blood thinners  - rest of care per NCC; nsgy will continue to follow        Caty Mejia MD  Neurosurgery  [facility]

## 2024-01-15 NOTE — ASSESSMENT & PLAN NOTE
59 yo F with PMH of hypothyroidism who presents as transfer from [facility] due to thunderclap headache that started suddenly on Friday, 6 days ago. She reports her headache has worsened more and more over the last week so she came to the hospital. She denies any illicit drug use or blood thinners. She reports her headache is a 10 out of 10.     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   Per report there was xanthochromia on LP performed at OSH on 11/10/24.     Presumably post bleed day 8  Coil embolization of Acomm 1/11    - admitted to [facility], q1h neuro checks  - SAH protocol // vasospasm watch   - keppra for seizure ppx   - Na > 135   - nimotop x21 days   - TCDs x14 days   - SBP < 180   - on room air, ctm  - strict I/O's; keep euvolemic to 1L net positive  - minimize narcotics for HA control  - hold any blood thinners  - rest of care per NCC; nsgy will continue to follow   Acute Care - Occupational Therapy Progress Note  [facility]     Patient Name: Dacia Nelson  : 1926  MRN: [id number]  Today's Date: 2018  Onset of Illness/Injury or Date of Surgery: 18  Date of Referral to OT: 18  Referring Physician: LEXI Rodarte    Admit Date: 2018       ICD-10-CM ICD-9-CM   1. Acute renal failure, unspecified acute renal failure type (CMS/[facility]) N17.9 584.9   2. Dehydration with hyponatremia E87.1 276.1   3. Adult failure to thrive syndrome R62.7 783.7   4. Malnutrition compromising bodily function (CMS/[facility]) E46 263.9   5. Dependent edema R60.9 782.3   6. Impaired functional mobility, balance, gait, and endurance Z74.09 V49.89   7. Impaired mobility and ADLs Z74.09 799.89   8. Upper GI bleed K92.2 578.9     Patient Active Problem List   Diagnosis   • Essential hypertension   • Irritable bowel syndrome with both constipation and diarrhea   • Primary osteoarthritis involving multiple joints   • FTT (failure to thrive) in adult   • Bereavement   • Anemia   • Chronic venous stasis dermatitis of both lower extremities   • Upper GI bleed   • Myalgia   • B12 deficiency   • Duodenal ulcer     Past Medical History:   Diagnosis Date   • Arthritis    • Cellulitis    • Gastritis    • Kidney stones      Past Surgical History:   Procedure Laterality Date   • CATARACT EXTRACTION, BILATERAL     • CHOLECYSTECTOMY     • ENDOSCOPY N/A 2018    Procedure: ESOPHAGOGASTRODUODENOSCOPY;  Surgeon: Elio Bledsoe MD;  Location: [facility] ENDOSCOPY;  Service: Gastroenterology   • HYSTERECTOMY         Therapy Treatment          Rehabilitation Treatment Summary     Row Name 18 0917             Treatment Time/Intention    Discipline occupational therapist  -PAULA      Document Type progress note/recertification  -JB2      Subjective Information complains of;weakness;fatigue  -PAULA      Mode of Treatment individual therapy;occupational therapy  -PAULA      Patient/Family Observations Pt.  sitting up in bed having had BF.  -PAULA      Care Plan Review care plan/treatment goals reviewed;risks/benefits reviewed;current/potential barriers reviewed  -PAULA      Patient Effort good  -PAULA      Existing Precautions/Restrictions fall   sensitive LE's  -JB3      Recorded by [PAULA] Emeli Bear, OT 09/24/18 0953  [JB2] Emeli Bear, OT 09/24/18 1007  [JB3] Emeli Bear, OT 09/24/18 1003      Row Name 09/24/18 0917             Vital Signs    Pre Systolic BP Rehab 129  -PAULA      Pre Treatment Diastolic BP 57  -PAULA      Pretreatment Heart Rate (beats/min) 67  -PAULA      Posttreatment Heart Rate (beats/min) 76  -PAULA      Pre SpO2 (%) 90  -PAULA      O2 Delivery Pre Treatment room air  -PAULA      Post SpO2 (%) 92  -PAULA      O2 Delivery Post Treatment room air  -PAULA      Pre Patient Position Supine  -PAULA      Intra Patient Position Standing  -PAULA      Post Patient Position Sitting  -PAULA      Recorded by [PAULA] Emeli Bear, OT 09/24/18 1003      Row Name 09/24/18 0917             Cognitive Assessment/Intervention- PT/OT    Affect/Mental Status (Cognitive) WFL  -PAULA      Orientation Status (Cognition) oriented to;person  -PAULA      Follows Commands (Cognition) follows one step commands;over 90% accuracy;repetition of directions required   [place] so at times needs repitition  -PAULA      Cognitive Function (Cognitive) safety deficit  -PAULA      Safety Deficit (Cognitive) mild deficit  -PAULA      Personal Safety Interventions fall prevention program maintained;gait belt;nonskid shoes/slippers when out of bed  -PAULA      Recorded by [PAULA] Emeli Bear, OT 09/24/18 1003      Row Name 09/24/18 0917             Safety Issues, Functional Mobility    Impairments Affecting Function (Mobility) endurance/activity tolerance;strength;postural/trunk control  -PAULA      Recorded by [PAULA] Emeli Bear, OT 09/24/18 1003      Row Name 09/24/18 0917             Bed Mobility Assessment/Treatment    Bed Mobility Assessment/Treatment rolling  left;supine-sit;scooting/bridging  -PAULA      Rolling Left [place] (Bed Mobility) minimum assist (75% patient effort);nonverbal cues (demo/gesture);verbal cues  -PAULA      Scooting/Bridging [place] (Bed Mobility) maximum assist (25% patient effort);verbal cues  -PAULA      Supine-Sit [place] (Bed Mobility) moderate assist (50% patient effort);nonverbal cues (demo/gesture);verbal cues  -PAULA      Bed Mobility, Safety Issues decreased use of arms for pushing/pulling;decreased use of legs for bridging/pushing;impaired trunk control for bed mobility  -PAULA      Assistive Device (Bed Mobility) bed rails;head of bed elevated  -PAULA      Comment (Bed Mobility) Pt. needing step by step cueing to sequence task.  -PAULA      Recorded by [PAULA] Emeli Bear, OT 09/24/18 1003      Row Name 09/24/18 0917             Functional Mobility    Functional Mobility- Ind. Level minimum assist (75% patient effort)  -PAULA      Functional Mobility- Device rolling walker  -PAULA      Functional Mobility-Distance (Feet) 2   several steps to chair.  -PAULA      Functional Mobility- Safety Issues step length decreased  -PAULA      Functional Mobility- Comment flexed posture.  -PAULA      Recorded by [PAULA] Emeli Bear, OT 09/24/18 1003      Row Name 09/24/18 0917             Transfer Assessment/Treatment    Transfer Assessment/Treatment sit-stand transfer;stand-sit transfer  -PAULA      Recorded by [PAULA] Emeli Bear, OT 09/24/18 1003      Row Name 09/24/18 0917             Sit-Stand Transfer    Sit-Stand [place] (Transfers) minimum assist (75% patient effort);verbal cues  -PAULA      Assistive Device (Sit-Stand Transfers) walker, front-wheeled   cues for hand placement.  -PAULA      Recorded by [PAULA] Emeli Bear, OT 09/24/18 1003      Row Name 09/24/18 0917             Stand-Sit Transfer    Stand-Sit [place] (Transfers) minimum assist (75% patient effort);verbal cues  -PAULA      Assistive Device (Stand-Sit Transfers) walker, front-wheeled   pt.  wanting to sit and not reach back.  -PAULA      Recorded by [PALUA] Emeli Bear, OT 09/24/18 1003      Row Name 09/24/18 0917             ADL Assessment/Intervention    44138 - OT Self Care/Mgmt Minutes 10  -PAULA      BADL Assessment/Intervention upper body dressing;grooming;lower body dressing  -PAULA      Recorded by [PAULA] Emeli Bear, OT 09/24/18 1003      Row Name 09/24/18 0917             Upper Body Dressing Assessment/Training    Upper Body Dressing [place] Level front opening garment;maximum assist (25% patient effort)  -PAULA      Upper Body Dressing Position edge of bed sitting  -PAULA      Comment (Upper Body Dressing) limited sh. ROM and strength  -PAULA      Recorded by [PAULA] Emeli Bear, OT 09/24/18 1003      Row Name 09/24/18 0917             Lower Body Dressing Assessment/Training    Comment (Lower Body Dressing) due to leg soreness and fatigue pt. declined AE use today  -PAULA      Recorded by [PAULA] Emeli Bear, OT 09/24/18 1003      Row Name 09/24/18 0917             Grooming Assessment/Training    [place] Level (Grooming) wash face, hands;supervision;set up;oral care regimen;minimum assist (75% patient effort);hair care, combing/brushing;moderate assist (50% patient effort)  -PAULA      Grooming Position unsupported sitting  -PAULA      Comment (Grooming) Pt. post setup and rinsing dentures pt. cleaned lower teeth and brushed dentures.  Pt. washed face and hands.  Pt. could only brush sides of hair due to limited strength and ROM  -PAULA      Recorded by [PAULA] Emeli Bear, OT 09/24/18 1003      Row Name 09/24/18 0917             BADL Safety/Performance    Impairments, BADL Safety/Performance strength;range of motion;endurance/activity tolerance  -PAULA      Progress in BADL Status improvement noted  -PAULA      Recorded by [PAULA] Emeli Bear, OT 09/24/18 1003      Row Name 09/24/18 0917             Therapeutic Exercise    93046 - OT Therapeutic Exercise Minutes 10  -PAULA      52577 - OT Therapeutic Activity  Minutes 11  -PAULA      Recorded by [PAULA] Emeli Bear, MENDOZA 09/24/18 1003      Row Name 09/24/18 0917             Upper Extremity Seated Therapeutic Exercise    Performed, Seated Upper Extremity (Therapeutic Exercise) shoulder flexion/extension;shoulder abduction/adduction;shoulder external/internal rotation;shoulder horizontal abduction/adduction;elbow flexion/extension;wrist flexion/extension   sup/pron, resistance biceps and triceps  -PAULA      Exercise Type, Seated Upper Extremity (Therapeutic Exercise) AROM (active range of motion);resistive exercise;AAROM (active assistive range of motion)   aAROM shoulders for romano range  -PAULA      Expected Outcomes, Seated Upper Extremity (Therapeutic Exercise) improve functional tolerance, self-care activity;improve performance, transfer skills  -PAULA      Sets/Reps Detail, Seated Upper Extremity (Therapeutic Exercise) 1/10  -PAULA      Recorded by [PAULA] Emeli Bear, OT 09/24/18 1003      Row Name 09/24/18 0917             Dynamic Sitting Balance    Level of [place], Reaches Outside Midline (Sitting, Dynamic Balance) supervision  -PAULA      Sitting Position, Reaches Outside Midline (Sitting, Dynamic Balance) sitting on edge of bed  -PAULA      Comment, Reaches Outside Midline (Sitting, Dynamic Balance) grooming task  -PALUA      Recorded by [PAULA] Emeli Bear, OT 09/24/18 1003      Row Name 09/24/18 0917             Dynamic Standing Balance    Level of [place], Reaches Outside Midline (Standing, Dynamic Balance) minimal assist, 75% patient effort   wx use.  -PAULA      Recorded by [PAULA] Emeli Bear, OT 09/24/18 1003      Row Name 09/24/18 0917             Positioning and Restraints    Pre-Treatment Position in bed  -PAULA      Post Treatment Position chair  -PAULA      In Chair reclined;encouraged to call for assist;call light within reach;exit alarm on;waffle cushion;legs elevated  -PAULA      Recorded by [PAULA] Emeli Bear, OT 09/24/18 1003      Row Name 09/24/18 0917              Pain Scale: Numbers Pre/Post-Treatment    Pain Scale: Numbers, Pretreatment 0/10 - no pain  -PAULA      Pain Scale: Numbers, Post-Treatment 0/10 - no pain  -PAULA      Recorded by [PAULA] Emeli Bear, MENDOZA 09/24/18 1003      Row Name                Wound 09/09/18 0911 coccyx fissure    Wound - Properties Group Date first assessed: 09/09/18 [] Time first assessed: 0911 [] Location: coccyx [] Type: fissure [] Stage, Pressure Injury: Stage 1 [] Recorded by:  [] Chary Ferguson RN 09/16/18 0916    Row Name 09/24/18 0917             Plan of Care Review    Plan of Care Reviewed With patient  -PAULA      Recorded by [PAULA] Emeli Bear OT 09/24/18 1003      Row Name 09/24/18 0917             Outcome Summary/Treatment Plan (OT)    Daily Summary of Progress (OT) progress toward functional goals is good  -PAULA      Barriers to Overall Progress (OT) fatigues.  -PAULA      Plan for Continued Treatment (OT) cont OT POC  -PAULA      Recorded by [PAUAL] Emeli Bear OT 09/24/18 1003        User Key  (r) = Recorded By, (t) = Taken By, (c) = Cosigned By    Initials Name Effective Dates Discipline    PAULA Emeli Bear, MENDOZA 06/08/18 -  OT     Chary Ferguson RN 04/17/18 -  Nurse        Wound 09/09/18 0911 coccyx fissure (Active)   Dressing Appearance dry;intact 9/24/2018  8:00 AM   Closure Open to air 9/24/2018  8:00 AM   Base clean;pink 9/24/2018  8:00 AM   Periwound blanchable;intact 9/24/2018  8:00 AM   Periwound Temperature warm 9/24/2018  8:00 AM   Periwound Skin Turgor soft 9/24/2018  8:00 AM   Edges open 9/24/2018  8:00 AM   Drainage Amount none 9/24/2018  8:00 AM   Care, Wound cleansed with 9/23/2018  8:00 PM   Dressing Care, Wound open to air 9/24/2018  8:00 AM             OT Rehab Goals     Row Name 09/24/18 0917             Transfer Goal 1 (OT)    Progress/Outcome (Transfer Goal 1, OT) goal partially met   met from EOB and into chair, goal still appropriate  -PAULA         Bathing Goal 1 (OT)    [place] Level/Cues  Needed (Bathing Goal 1, OT) moderate assist (50-74% patient effort)  -PAULA      Progress/Outcomes (Bathing Goal 1, OT) goal revised this date  -PAULA         Dressing Goal 1 (OT)    Progress/Outcome (Dressing Goal 1, OT) goal ongoing   goal still appropriate  -PAULA        User Key  (r) = Recorded By, (t) = Taken By, (c) = Cosigned By    Initials Name Provider Type Discipline    Emeli Gaytan, OT Occupational Therapist OT        Occupational Therapy Education     Title: PT OT SLP Therapies (Active)     Topic: Occupational Therapy (Active)     Point: ADL training (Done)     Description: Instruct learner(s) on proper safety adaptation and remediation techniques during self care or transfers.   Instruct in proper use of assistive devices.   Learning Progress Summary     Learner Status Readiness Method Response Comment Documented by    Patient Done Acceptance E,D VU,NR bed mobility, UE ther. exercises, benefits of activity, transfer safety  09/24/18 1004     Done Acceptance E VU   09/19/18 2104     Done Acceptance E, VU  SC 09/19/18 0511     Done Acceptance E, VU  SC 09/18/18 0159     Done Acceptance E, VU  SC 09/17/18 0124     Done Acceptance E VU,NR Education initiated for benefits of OOB activity/therapy, role of OT, transfer training/safety and recommendation for SNF at d/[facility] 09/14/18 1411    Family Done Acceptance E VU   09/19/18 2104    Other Done Acceptance E VU,NR Education initiated for benefits of OOB activity/therapy, role of OT, transfer training/safety and recommendation for SNF at d/[facility] 09/14/18 1411          Point: Home exercise program (Done)     Description: Instruct learner(s) on appropriate technique for monitoring, assisting and/or progressing therapeutic exercises/activities.   Learning Progress Summary     Learner Status Readiness Method Response Comment Documented by    Patient Done Acceptance E,D VU,NR bed mobility, UE ther. exercises, benefits of activity, transfer safety  09/24/18  1004          Point: Precautions (Done)     Description: Instruct learner(s) on prescribed precautions during self-care and functional transfers.   Learning Progress Summary     Learner Status Readiness Method Response Comment Documented by    Patient Done Acceptance VONDA FLORES,NR bed mobility, UE ther. exercises, benefits of activity, transfer safety  09/24/18 1004                      User Key     Initials Effective Dates Name Provider Type Discipline    TB 06/08/18 -  Shelbie Jonas, OT Occupational Therapist OT    PAULA 06/08/18 -  Emeli Bear, OT Occupational Therapist OT    SC 06/16/16 -  Denise Luong RN Registered Nurse Nurse    LD 07/11/18 -  Sarah Ortez RN Registered Nurse Nurse                OT Recommendation and Plan  Outcome Summary/Treatment Plan (OT)  Daily Summary of Progress (OT): progress toward functional goals is good  Barriers to Overall Progress (OT): fatigues.  Plan for Continued Treatment (OT): cont OT POC  Daily Summary of Progress (OT): progress toward functional goals is good  Plan of Care Review  Plan of Care Reviewed With: patient  Plan of Care Reviewed With: patient  Outcome Summary: Pt. able to tolerate to EOB mod assist overall, participating grooming task and to stand and turn to chair min assist.  UE ther exer with JOHN pt. reporting cont. weakness RUE.         Outcome Measures     Row Name 09/24/18 0917             How much help from another is currently needed...    Putting on and taking off regular lower body clothing? 2  -PAULA      Bathing (including washing, rinsing, and drying) 2  -PAULA      Toileting (which includes using toilet bed pan or urinal) 2  -PAULA      Putting on and taking off regular upper body clothing 2  -PAULA      Taking care of personal grooming (such as brushing teeth) 3  -PAULA      Eating meals 3  -PAULA      Score 14  -PAULA         Functional Assessment    Outcome Measure Options AM-PAC 6 Clicks Daily Activity (OT)  -PAULA        User Key  (r) = Recorded By, (t)  = Taken By, (c) = Cosigned By    Initials Name Provider Type    Emeli Gaytan OT Occupational Therapist           Time Calculation:         Time Calculation- OT     Row Name 09/24/18 0917             Time Calculation- OT    OT Start Time 0917  -PAULA      OT Received On 09/24/18  -PAULA      OT Goal Re-Cert Due Date 10/04/18  -PAULA         Timed Charges    51332 - OT Therapeutic Exercise Minutes 10  -PAULA      84348 - OT Therapeutic Activity Minutes 11  -PAULA      33809 - OT Self Care/Mgmt Minutes 10  -PAULA        User Key  (r) = Recorded By, (t) = Taken By, (c) = Cosigned By    Initials Name Provider Type    Emeli Gaytan OT Occupational Therapist           Therapy Suggested Charges     Code   Minutes Charges    73123 (CPT®) Hc Ot Neuromusc Re Education Ea 15 Min      10555 (CPT®) Hc Ot Ther Proc Ea 15 Min 10 1    94332 (CPT®) Hc Ot Therapeutic Act Ea 15 Min 11 1    54195 (CPT®) Hc Ot Manual Therapy Ea 15 Min      38457 (CPT®) Hc Ot Iontophoresis Ea 15 Min      43438 (CPT®) Hc Ot Elec Stim Ea-Per 15 Min      60838 (CPT®) Hc Ot Ultrasound Ea 15 Min      10644 (CPT®) Hc Ot Self Care/Mgmt/Train Ea 15 Min 10     Total  31 2        Therapy Charges for Today     Code Description Service Date Service Provider Modifiers Qty    33416964697 HC OT THER PROC EA 15 MIN 9/24/2018 Emeli Bear OT GO 1    88387034045 HC OT THERAPEUTIC ACT EA 15 MIN 9/24/2018 Emeli Bear OT GO 1               Emeli Bear OT  9/24/2018

## 2024-01-15 NOTE — PLAN OF CARE
"[facility] Care Plan    POC reviewed with Loreto Goff and family at 0300. Pt verbalized understanding. Questions and concerns addressed. No acute events overnight. Pt progressing toward goals. Will continue to monitor. See below and flowsheets for full assessment and VS info.     -cardene started  -NS @ 75/hr  -potassium and phos replaced    Is this a stroke patient? yes- Stroke booklet reviewed with patient, risk factors identified for patient and stroke booklet remains at bedside for ongoing education.     Neuro:  [place] Coma Scale  Best Eye Response: 4-->(E4) spontaneous  Best Motor Response: 6-->(M6) obeys commands  Best Verbal Response: 5-->(V5) oriented  [place] Coma Scale Score: 15  Assessment Qualifiers: no eye obstruction present  Pupil PERRLA: yes     24hr Temp:  [98.7 °F (37.1 °C)-99.8 °F (37.7 °C)]     CV:   Rhythm: normal sinus rhythm  BP goals:   SBP < 180  MAP > 65    Resp:           Plan: N/A    GI/:     Diet/Nutrition Received: regular  Last Bowel Movement: 01/12/24  Voiding Characteristics: external catheter    Intake/Output Summary (Last 24 hours) at 1/15/2024 0402  Last data filed at 1/15/2024 0305  Gross per 24 hour   Intake 6047.62 ml   Output 6475 ml   Net -427.38 ml     Unmeasured Output  Urine Occurrence: 1  Stool Occurrence: 0  Pad Count: 3    Labs/Accuchecks:  Recent Labs   Lab 01/15/24  0019   WBC 15.14*   RBC 4.21   HGB 11.9*   HCT 36.2*         Recent Labs   Lab 01/15/24  0019   *   K 3.3*   CO2 24   CL 99   BUN 6   CREATININE 0.6   ALKPHOS 81   ALT 45*   AST 25   BILITOT 0.3      Recent Labs   Lab 01/11/24  0552   INR 1.0   APTT 24.0    No results for input(s): "CPK", "CPKMB", "TROPONINI", "MB" in the last 168 hours.    Electrolytes: Electrolytes replaced  Accuchecks: none    Gtts:   sodium chloride 0.9% 75 mL/hr at 01/15/24 0351    niCARdipine 5 mg/hr (01/15/24 0305)       LDA/Wounds:  Lines/Drains/Airways       Peripheral Intravenous Line  Duration                  " Peripheral IV - Single Lumen 01/12/24 0500 18 G Anterior;Distal;Right Upper Arm 2 days         Peripheral IV - Single Lumen 01/13/24 1824 20 G Anterior;Left Upper Arm 1 day                  Wounds: No  Wound care consulted: No

## 2024-01-16 PROBLEM — R33.9 URINARY RETENTION: Status: RESOLVED | Noted: 2024-01-13 | Resolved: 2024-01-16

## 2024-01-16 LAB
ALBUMIN SERPL BCP-MCNC: 2.9 G/DL (ref 3.5–5.2)
ALP SERPL-CCNC: 68 U/L (ref 55–135)
ALT SERPL W/O P-5'-P-CCNC: 39 U/L (ref 10–44)
ANION GAP SERPL CALC-SCNC: 11 MMOL/L (ref 8–16)
AST SERPL-CCNC: 17 U/L (ref 10–40)
BASOPHILS # BLD AUTO: 0.05 K/UL (ref 0–0.2)
BASOPHILS NFR BLD: 0.4 % (ref 0–1.9)
BILIRUB SERPL-MCNC: 0.3 MG/DL (ref 0.1–1)
BUN SERPL-MCNC: 10 MG/DL (ref 6–20)
CALCIUM SERPL-MCNC: 9.2 MG/DL (ref 8.7–10.5)
CHLORIDE SERPL-SCNC: 106 MMOL/L (ref 95–110)
CO2 SERPL-SCNC: 23 MMOL/L (ref 23–29)
CREAT SERPL-MCNC: 0.6 MG/DL (ref 0.5–1.4)
DIFFERENTIAL METHOD BLD: ABNORMAL
EOSINOPHIL # BLD AUTO: 0.3 K/UL (ref 0–0.5)
EOSINOPHIL NFR BLD: 2.2 % (ref 0–8)
ERYTHROCYTE [DISTWIDTH] IN BLOOD BY AUTOMATED COUNT: 13.3 % (ref 11.5–14.5)
EST. GFR  (NO RACE VARIABLE): >60 ML/MIN/1.73 M^2
GLUCOSE SERPL-MCNC: 132 MG/DL (ref 70–110)
HCT VFR BLD AUTO: 36.9 % (ref 37–48.5)
HGB BLD-MCNC: 11.9 G/DL (ref 12–16)
IMM GRANULOCYTES # BLD AUTO: 0.04 K/UL (ref 0–0.04)
IMM GRANULOCYTES NFR BLD AUTO: 0.3 % (ref 0–0.5)
LYMPHOCYTES # BLD AUTO: 2.8 K/UL (ref 1–4.8)
LYMPHOCYTES NFR BLD: 23.7 % (ref 18–48)
MAGNESIUM SERPL-MCNC: 1.9 MG/DL (ref 1.6–2.6)
MCH RBC QN AUTO: 29.2 PG (ref 27–31)
MCHC RBC AUTO-ENTMCNC: 32.2 G/DL (ref 32–36)
MCV RBC AUTO: 90 FL (ref 82–98)
MONOCYTES # BLD AUTO: 1.1 K/UL (ref 0.3–1)
MONOCYTES NFR BLD: 9.2 % (ref 4–15)
NEUTROPHILS # BLD AUTO: 7.7 K/UL (ref 1.8–7.7)
NEUTROPHILS NFR BLD: 64.2 % (ref 38–73)
NRBC BLD-RTO: 0 /100 WBC
PHOSPHATE SERPL-MCNC: 3.3 MG/DL (ref 2.7–4.5)
PLATELET # BLD AUTO: 286 K/UL (ref 150–450)
PMV BLD AUTO: 11.1 FL (ref 9.2–12.9)
POCT GLUCOSE: 131 MG/DL (ref 70–110)
POTASSIUM SERPL-SCNC: 3.9 MMOL/L (ref 3.5–5.1)
PROT SERPL-MCNC: 6.2 G/DL (ref 6–8.4)
RBC # BLD AUTO: 4.08 M/UL (ref 4–5.4)
SODIUM SERPL-SCNC: 137 MMOL/L (ref 136–145)
SODIUM SERPL-SCNC: 139 MMOL/L (ref 136–145)
SODIUM SERPL-SCNC: 140 MMOL/L (ref 136–145)
SODIUM SERPL-SCNC: 141 MMOL/L (ref 136–145)
WBC # BLD AUTO: 11.96 K/UL (ref 3.9–12.7)

## 2024-01-16 PROCEDURE — 63600175 PHARM REV CODE 636 W HCPCS: Performed by: PSYCHIATRY & NEUROLOGY

## 2024-01-16 PROCEDURE — 84100 ASSAY OF PHOSPHORUS: CPT

## 2024-01-16 PROCEDURE — 85025 COMPLETE CBC W/AUTO DIFF WBC: CPT

## 2024-01-16 PROCEDURE — 97535 SELF CARE MNGMENT TRAINING: CPT

## 2024-01-16 PROCEDURE — 84295 ASSAY OF SERUM SODIUM: CPT

## 2024-01-16 PROCEDURE — 83735 ASSAY OF MAGNESIUM: CPT | Performed by: PHYSICIAN ASSISTANT

## 2024-01-16 PROCEDURE — 80053 COMPREHEN METABOLIC PANEL: CPT

## 2024-01-16 PROCEDURE — 25000003 PHARM REV CODE 250: Performed by: PHYSICIAN ASSISTANT

## 2024-01-16 PROCEDURE — 25000003 PHARM REV CODE 250: Performed by: NURSE PRACTITIONER

## 2024-01-16 PROCEDURE — 99233 SBSQ HOSP IP/OBS HIGH 50: CPT | Mod: ,,, | Performed by: PSYCHIATRY & NEUROLOGY

## 2024-01-16 PROCEDURE — 97130 THER IVNTJ EA ADDL 15 MIN: CPT

## 2024-01-16 PROCEDURE — 97530 THERAPEUTIC ACTIVITIES: CPT

## 2024-01-16 PROCEDURE — 84295 ASSAY OF SERUM SODIUM: CPT | Mod: 91 | Performed by: NURSE PRACTITIONER

## 2024-01-16 PROCEDURE — 94761 N-INVAS EAR/PLS OXIMETRY MLT: CPT

## 2024-01-16 PROCEDURE — 97129 THER IVNTJ 1ST 15 MIN: CPT

## 2024-01-16 PROCEDURE — 25000003 PHARM REV CODE 250

## 2024-01-16 PROCEDURE — 99900035 HC TECH TIME PER 15 MIN (STAT)

## 2024-01-16 PROCEDURE — 25000003 PHARM REV CODE 250: Performed by: PSYCHIATRY & NEUROLOGY

## 2024-01-16 PROCEDURE — 84295 ASSAY OF SERUM SODIUM: CPT | Mod: 91

## 2024-01-16 PROCEDURE — 20000000 HC ICU ROOM

## 2024-01-16 PROCEDURE — 63600175 PHARM REV CODE 636 W HCPCS

## 2024-01-16 RX ORDER — METHOCARBAMOL 750 MG/1
750 TABLET, FILM COATED ORAL 4 TIMES DAILY
Status: DISCONTINUED | OUTPATIENT
Start: 2024-01-16 | End: 2024-01-24 | Stop reason: HOSPADM

## 2024-01-16 RX ORDER — BISACODYL 10 MG/1
10 SUPPOSITORY RECTAL DAILY PRN
Status: DISCONTINUED | OUTPATIENT
Start: 2024-01-16 | End: 2024-01-24 | Stop reason: HOSPADM

## 2024-01-16 RX ORDER — POLYETHYLENE GLYCOL 3350 17 G/17G
17 POWDER, FOR SOLUTION ORAL DAILY
Status: DISCONTINUED | OUTPATIENT
Start: 2024-01-16 | End: 2024-01-18

## 2024-01-16 RX ADMIN — OXYCODONE HYDROCHLORIDE 5 MG: 5 TABLET ORAL at 05:01

## 2024-01-16 RX ADMIN — METHOCARBAMOL 500 MG: 500 TABLET ORAL at 09:01

## 2024-01-16 RX ADMIN — FLUDROCORTISONE ACETATE 100 MCG: 0.1 TABLET ORAL at 09:01

## 2024-01-16 RX ADMIN — SENNOSIDES AND DOCUSATE SODIUM 1 TABLET: 50; 8.6 TABLET ORAL at 09:01

## 2024-01-16 RX ADMIN — NIMODIPINE 60 MG: 30 CAPSULE, LIQUID FILLED ORAL at 06:01

## 2024-01-16 RX ADMIN — NIMODIPINE 60 MG: 30 CAPSULE, LIQUID FILLED ORAL at 10:01

## 2024-01-16 RX ADMIN — SODIUM CHLORIDE 2000 MG: 1 TABLET ORAL at 09:01

## 2024-01-16 RX ADMIN — NIMODIPINE 60 MG: 30 CAPSULE, LIQUID FILLED ORAL at 02:01

## 2024-01-16 RX ADMIN — SODIUM CHLORIDE 1000 ML: 9 INJECTION, SOLUTION INTRAVENOUS at 02:01

## 2024-01-16 RX ADMIN — SODIUM CHLORIDE 2000 MG: 1 TABLET ORAL at 10:01

## 2024-01-16 RX ADMIN — PREGABALIN 225 MG: 150 CAPSULE ORAL at 10:01

## 2024-01-16 RX ADMIN — LEVOTHYROXINE SODIUM 25 MCG: 25 TABLET ORAL at 06:01

## 2024-01-16 RX ADMIN — FLUDROCORTISONE ACETATE 100 MCG: 0.1 TABLET ORAL at 10:01

## 2024-01-16 RX ADMIN — SODIUM CHLORIDE 500 ML: 9 INJECTION, SOLUTION INTRAVENOUS at 12:01

## 2024-01-16 RX ADMIN — MORPHINE SULFATE 2 MG: 2 INJECTION, SOLUTION INTRAMUSCULAR; INTRAVENOUS at 09:01

## 2024-01-16 RX ADMIN — OXYCODONE HYDROCHLORIDE 5 MG: 5 TABLET ORAL at 12:01

## 2024-01-16 RX ADMIN — PREGABALIN 225 MG: 150 CAPSULE ORAL at 09:01

## 2024-01-16 RX ADMIN — NIMODIPINE 60 MG: 30 CAPSULE, LIQUID FILLED ORAL at 01:01

## 2024-01-16 RX ADMIN — MORPHINE SULFATE 2 MG: 2 INJECTION, SOLUTION INTRAMUSCULAR; INTRAVENOUS at 08:01

## 2024-01-16 RX ADMIN — MORPHINE SULFATE 2 MG: 2 INJECTION, SOLUTION INTRAMUSCULAR; INTRAVENOUS at 03:01

## 2024-01-16 RX ADMIN — METHOCARBAMOL TABLETS 750 MG: 750 TABLET, COATED ORAL at 12:01

## 2024-01-16 RX ADMIN — LIDOCAINE 1 PATCH: 50 PATCH TOPICAL at 02:01

## 2024-01-16 RX ADMIN — SODIUM CHLORIDE 1000 ML: 9 INJECTION, SOLUTION INTRAVENOUS at 09:01

## 2024-01-16 RX ADMIN — ENOXAPARIN SODIUM 40 MG: 100 INJECTION SUBCUTANEOUS at 05:01

## 2024-01-16 RX ADMIN — MORPHINE SULFATE 2 MG: 2 INJECTION, SOLUTION INTRAMUSCULAR; INTRAVENOUS at 06:01

## 2024-01-16 RX ADMIN — NIMODIPINE 60 MG: 30 CAPSULE, LIQUID FILLED ORAL at 09:01

## 2024-01-16 RX ADMIN — METHOCARBAMOL TABLETS 750 MG: 750 TABLET, COATED ORAL at 10:01

## 2024-01-16 RX ADMIN — NIMODIPINE 60 MG: 30 CAPSULE, LIQUID FILLED ORAL at 05:01

## 2024-01-16 RX ADMIN — SODIUM CHLORIDE 2000 MG: 1 TABLET ORAL at 03:01

## 2024-01-16 RX ADMIN — POLYETHYLENE GLYCOL 3350 17 G: 17 POWDER, FOR SOLUTION ORAL at 09:01

## 2024-01-16 RX ADMIN — METHOCARBAMOL TABLETS 750 MG: 750 TABLET, COATED ORAL at 05:01

## 2024-01-16 RX ADMIN — OXYCODONE HYDROCHLORIDE 5 MG: 5 TABLET ORAL at 06:01

## 2024-01-16 NOTE — ASSESSMENT & PLAN NOTE
Concern for mild CSW 2/2 SAH    - Monitor I&Os, goal euvolemia  - Monitor Na q6hrs, goal eunatremia  - C/w salt tabs 2 g TID, encourage PO salt intake

## 2024-01-16 NOTE — SUBJECTIVE & OBJECTIVE
Interval History: 1/16 naeon, worsening HA this morning but neuro exam is stable. Continue ICU care, plan for MRA w contrast with vessel wall imaging later this week    Medications:  Continuous Infusions:   niCARdipine Stopped (01/15/24 1107)     Scheduled Meds:   enoxparin  40 mg Subcutaneous Q24H (prophylaxis, 1700)    fludrocortisone  100 mcg Oral BID    levothyroxine  25 mcg Oral Before breakfast    LIDOcaine  1 patch Transdermal Q24H    methocarbamoL  500 mg Oral QID    mupirocin   Nasal BID    niMODipine  60 mg Oral Q4H    pregabalin  225 mg Oral BID    senna-docusate 8.6-50 mg  1 tablet Oral Daily    sodium chloride  2,000 mg Oral TID     PRN Meds:acetaminophen, dextrose 10%, dextrose 10%, glucagon (human recombinant), hydrALAZINE, insulin aspart U-100, labetalol, magnesium oxide, magnesium oxide, morphine, ondansetron, oxyCODONE, potassium bicarbonate, potassium bicarbonate, potassium bicarbonate, potassium, sodium phosphates, potassium, sodium phosphates, potassium, sodium phosphates     Review of Systems  Objective:     Weight: 97.1 kg (214 lb)  Body mass index is 33.52 kg/m².  Vital Signs (Most Recent):  Temp: 99 °F (37.2 °C) (01/16/24 0301)  Pulse: 82 (01/16/24 0601)  Resp: 17 (01/16/24 0602)  BP: (!) 165/81 (01/16/24 0608)  SpO2: 100 % (01/16/24 0601) Vital Signs (24h Range):  Temp:  [99 °F (37.2 °C)-100 °F (37.8 °C)] 99 °F (37.2 °C)  Pulse:  [76-97] 82  Resp:  [15-43] 17  SpO2:  [93 %-100 %] 100 %  BP: (118-181)/(57-83) 165/81                                   Physical Exam         Neurosurgery Physical Exam    Constitutional:       Appearance: She is well-developed and well-nourished.   Eyes:      Extraocular Movements: EOM normal.      Conjunctiva/sclera: Conjunctivae normal.      Pupils: Pupils are equal, round, and reactive to light.   Cardiovascular:      Pulses: Normal pulses.   Abdominal:      Palpations: Abdomen is soft.   Neurological:      Mental Status: She is alert and oriented to person,  "place, and time.      Comments: E4V5M6  AAOx3  PERRL  Cranial nerves intact  Vision grossly normal, +photophobia  Patient follows commands all extremities; noncompliant with full exam  At least 4/5 strength in all extremities; non focal   SILT  DTRs normal     Significant Labs:  Recent Labs   Lab 01/15/24  0019 01/15/24  0611 01/16/24  0041 01/16/24  0437 01/16/24  0616   *  --   --  132*  --    *   < > 141 140 139   K 3.3*  --   --  3.9  --    CL 99  --   --  106  --    CO2 24  --   --  23  --    BUN 6  --   --  10  --    CREATININE 0.6  --   --  0.6  --    CALCIUM 8.7  --   --  9.2  --    MG 1.8  --   --  1.9  --     < > = values in this interval not displayed.       Recent Labs   Lab 01/15/24  0019 01/16/24  0437   WBC 15.14* 11.96   HGB 11.9* 11.9*   HCT 36.2* 36.9*    286       No results for input(s): "LABPT", "INR", "APTT" in the last 48 hours.  Microbiology Results (last 7 days)       ** No results found for the last 168 hours. **          All pertinent labs from the last 24 hours have been reviewed.    Significant Diagnostics:  CT: No results found in the last 24 hours.  MRI: No results found in the last 24 hours.  "

## 2024-01-16 NOTE — ASSESSMENT & PLAN NOTE
Concern for mild CSW 2/2 SAH    - Monitor I&Os, goal euvolemia  - Improved overnight, space out checks to q12hrs  - Goal eunatremia  - C/w salt tabs 2 g TID, fludrocortisone 100 mcg BID, encourage PO salt intake

## 2024-01-16 NOTE — ASSESSMENT & PLAN NOTE
59 yo F with PMH of hypothyroidism who presents as transfer from [facility] due to thunderclap headache that started suddenly on Friday, 6 days ago. She reports her headache has worsened more and more over the last week so she came to the hospital. She denies any illicit drug use or blood thinners. She reports her headache is a 10 out of 10.     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   Per report there was xanthochromia on LP performed at OSH on 11/10/24.     Presumably post bleed day 8  Coil embolization of Acomm 1/11    - admitted to [facility], q1h neuro checks  - SAH protocol // vasospasm watch   - keppra for seizure ppx   - Na > 135   - nimotop x21 days   - TCDs x14 days   - SBP < 180   - on room air, ctm  - strict I/O's; keep euvolemic to 1L net positive  - minimize narcotics for HA control  - hold any blood thinners  - rest of care per NCC; nsgy will continue to follow

## 2024-01-16 NOTE — PLAN OF CARE
"[facility] Care Plan    POC reviewed with Loreto Goff and family at 0300. Pt verbalized understanding. Questions and concerns addressed. No acute events overnight. Pt progressing toward goals. Will continue to monitor. See below and flowsheets for full assessment and VS info.   -1L NS bolus given   -PRN oxy and morphine given for HA   -Q6 Na     Is this a stroke patient? yes- Stroke booklet reviewed with patient, risk factors identified for patient and stroke booklet remains at bedside for ongoing education.     Neuro:  [place] Coma Scale  Best Eye Response: 4-->(E4) spontaneous  Best Motor Response: 6-->(M6) obeys commands  Best Verbal Response: 5-->(V5) oriented  [place] Coma Scale Score: 15  Assessment Qualifiers: patient not sedated/intubated, no eye obstruction present  Pupil PERRLA: yes     24hr Temp:  [99 °F (37.2 °C)-100 °F (37.8 °C)]     CV:   Rhythm: normal sinus rhythm  BP goals:   SBP < 220  MAP > 65    Resp:           Plan: N/A    GI/:     Diet/Nutrition Received: regular  Last Bowel Movement: 01/12/24  Voiding Characteristics: voids spontaneously without difficulty    Intake/Output Summary (Last 24 hours) at 1/16/2024 0640  Last data filed at 1/16/2024 0455  Gross per 24 hour   Intake 2405.63 ml   Output 2800 ml   Net -394.37 ml     Unmeasured Output  Urine Occurrence: 1  Stool Occurrence: 0  Pad Count: 3    Labs/Accuchecks:  Recent Labs   Lab 01/16/24  0437   WBC 11.96   RBC 4.08   HGB 11.9*   HCT 36.9*         Recent Labs   Lab 01/16/24  0437      K 3.9   CO2 23      BUN 10   CREATININE 0.6   ALKPHOS 68   ALT 39   AST 17   BILITOT 0.3      Recent Labs   Lab 01/11/24  0552   INR 1.0   APTT 24.0    No results for input(s): "CPK", "CPKMB", "TROPONINI", "MB" in the last 168 hours.    Electrolytes: N/A - electrolytes WDL  Accuchecks: ACHS    Gtts:   niCARdipine Stopped (01/15/24 1107)       LDA/Wounds:  Lines/Drains/Airways       Peripheral Intravenous Line  Duration                  " Peripheral IV - Single Lumen 01/13/24 1824 20 G Anterior;Left Upper Arm 2 days         Peripheral IV - Single Lumen 01/16/24 0101 20 G Anterior;Left Forearm <1 day                  Wounds: No  Wound care consulted: No    Problem: Adult Inpatient Plan of Care  Goal: Plan of Care Review  Flowsheets (Taken 1/16/2024 0508)  Plan of Care Reviewed With: patient     Problem: Cerebral Tissue Perfusion (Stroke, Hemorrhagic)  Goal: Optimal Cerebral Tissue Perfusion  Intervention: Protect and Optimize Cerebral Perfusion  Flowsheets (Taken 1/16/2024 0508)  Sensory Stimulation Regulation:   care clustered   lighting decreased   quiet environment promoted  Cerebral Perfusion Promotion: blood pressure monitored  Fluid/Electrolyte Management: fluids provided  Stabilization Measures: airway opened     Problem: Functional Ability Impaired (Stroke, Hemorrhagic)  Goal: Optimal Functional Ability  Intervention: Optimize Functional Ability  Flowsheets (Taken 1/16/2024 0508)  Self-Care Promotion:   independence encouraged   safe use of adaptive equipment encouraged  Activity Management: Up to bedside commode - L3

## 2024-01-16 NOTE — ASSESSMENT & PLAN NOTE
Hx of    - Goal -220 mmHg during vasospasm period  - Hold home antihypertensives  - PRN labetalol, hydralazine

## 2024-01-16 NOTE — SUBJECTIVE & OBJECTIVE
Interval History:  Please see hospital course above for full details    Review of Systems   Constitutional:  Positive for appetite change and fatigue.   Eyes:  Positive for photophobia.   Respiratory: Negative.     Cardiovascular: Negative.    Gastrointestinal: Negative.    Musculoskeletal:  Positive for neck pain.   Neurological:  Positive for numbness and headaches. Negative for seizures, facial asymmetry, speech difficulty, weakness and light-headedness.        L 2nd-5th toe numbness   Psychiatric/Behavioral:  Positive for decreased concentration, dysphoric mood and sleep disturbance.        Objective:     Vitals:  Temp: 99 °F (37.2 °C)  Pulse: 87  Rhythm: normal sinus rhythm  BP: (!) 157/71  MAP (mmHg): 102  Resp: 18  SpO2: 96 %    Temp  Min: 98.9 °F (37.2 °C)  Max: 100 °F (37.8 °C)  Pulse  Min: 76  Max: 105  BP  Min: 118/57  Max: 193/80  MAP (mmHg)  Min: 82  Max: 119  Resp  Min: 12  Max: 43  SpO2  Min: 92 %  Max: 98 %    01/14 0701 - 01/15 0700  In: 5109 [I.V.:2282.9]  Out: 5550 [Urine:5550]   Unmeasured Output  Urine Occurrence: 1  Stool Occurrence: 0  Pad Count: 3        Physical Exam  General Appearance: Middle aged woman resting in bed, covering eyes, not in acute hemodynamic distress. Appears uncomfortable  Mental Status Exam: awake, alert, aware, oriented, no aphasia, no dysarthria. Irritable with examiner  Cranial Nerves: VFF, EOM full, pupils are equal and reactive to light bilaterally, symmetric facial sensory, symmetric facial motor, hearing grossly normal, midline tongue  Motor: no drift in the UE/LE. Power is 5/5 in both UE/LE. Normal tone.  Sensory: Symmetric to LT in all 4 limbs without extinction  Coordination: Unable to assess 2/2 poor pt cooperation   Vascular: S1/S2 of normal intensity, no S3/S4 appreciated, no murmurs appreciated  Lungs: CTA bilaterally without wheezing  Abdomen: Soft, non-distended, non-tender, BS +         Medications:  ContinuousniCARdipine, Last Rate: Stopped (01/15/24  1107)    Scheduledenoxparin, 40 mg, Q24H (prophylaxis, 1700)  fludrocortisone, 100 mcg, BID  levothyroxine, 25 mcg, Before breakfast  LIDOcaine, 1 patch, Q24H  methocarbamoL, 500 mg, QID  mupirocin, , BID  niMODipine, 60 mg, Q4H  pregabalin, 225 mg, BID  senna-docusate 8.6-50 mg, 1 tablet, Daily  sodium chloride, 2,000 mg, TID    PRNacetaminophen, 1,000 mg, Q8H PRN  dextrose 10%, 12.5 g, PRN  dextrose 10%, 25 g, PRN  glucagon (human recombinant), 1 mg, PRN  hydrALAZINE, 10 mg, Q4H PRN  insulin aspart U-100, 0-10 Units, QID (AC + HS) PRN  labetalol, 10 mg, Q4H PRN  magnesium oxide, 800 mg, PRN  magnesium oxide, 800 mg, PRN  morphine, 2 mg, Q2H PRN  ondansetron, 4 mg, Q6H PRN  oxyCODONE, 5 mg, Q6H PRN  potassium bicarbonate, 35 mEq, PRN  potassium bicarbonate, 50 mEq, PRN  potassium bicarbonate, 60 mEq, PRN  potassium, sodium phosphates, 2 packet, PRN  potassium, sodium phosphates, 2 packet, PRN  potassium, sodium phosphates, 2 packet, PRN      Today I personally reviewed pertinent imaging, laboratory results, notably:    Diet  Diet Adult Regular (IDDSI Level 7)  Diet Adult Regular (IDDSI Level 7)

## 2024-01-16 NOTE — PLAN OF CARE
"[facility] Care Plan    POC reviewed with Loreto Goff and family at 1400. Pt verbalized understanding. Questions and concerns addressed. No acute events today. Pt progressing toward goals. Will continue to monitor. See below and flowsheets for full assessment and VS info.     -pain management  -pt got up to chair with PT          Is this a stroke patient? yes- Stroke booklet reviewed with patient, risk factors identified for patient and stroke booklet remains at bedside for ongoing education.     Neuro:  [place] Coma Scale  Best Eye Response: 3-->(E3) to speech  Best Motor Response: 6-->(M6) obeys commands  Best Verbal Response: 5-->(V5) oriented  [place] Coma Scale Score: 14  Assessment Qualifiers: patient not sedated/intubated  Pupil PERRLA: yes     24 hr Temp:  [98.9 °F (37.2 °C)-100 °F (37.8 °C)]     CV:   Rhythm: normal sinus rhythm  BP goals:   SBP < 220  MAP > 65    Resp:           Plan: N/A    GI/:     Diet/Nutrition Received: regular  Last Bowel Movement: 01/13/24  Voiding Characteristics: voids spontaneously without difficulty    Intake/Output Summary (Last 24 hours) at 1/16/2024 1421  Last data filed at 1/16/2024 1401  Gross per 24 hour   Intake 4227.8 ml   Output 3950 ml   Net 277.8 ml     Unmeasured Output  Urine Occurrence: 1  Stool Occurrence: 0  Pad Count: 3    Labs/Accuchecks:  Recent Labs   Lab 01/16/24  0437   WBC 11.96   RBC 4.08   HGB 11.9*   HCT 36.9*         Recent Labs   Lab 01/16/24  0437 01/16/24  0616    139   K 3.9  --    CO2 23  --      --    BUN 10  --    CREATININE 0.6  --    ALKPHOS 68  --    ALT 39  --    AST 17  --    BILITOT 0.3  --       Recent Labs   Lab 01/11/24  0552   INR 1.0   APTT 24.0    No results for input(s): "CPK", "CPKMB", "TROPONINI", "MB" in the last 168 hours.    Electrolytes: N/A - electrolytes WDL  Accuchecks: ACHS    Gtts:      LDA/Wounds:  Lines/Drains/Airways       Peripheral Intravenous Line  Duration                  Peripheral IV - Single " Lumen 01/13/24 1824 20 G Anterior;Left Upper Arm 2 days         Peripheral IV - Single Lumen 01/16/24 0101 20 G Anterior;Left Forearm <1 day                  Wounds:        Wound care consulted: No

## 2024-01-16 NOTE — SUBJECTIVE & OBJECTIVE
Interval History:  Please see hospital course above for full details    Review of Systems   Constitutional:  Positive for activity change, appetite change and fatigue. Negative for fever.   Respiratory: Negative.     Cardiovascular: Negative.    Gastrointestinal: Negative.    Musculoskeletal:  Positive for neck pain and neck stiffness.   Neurological:  Positive for numbness and headaches. Negative for dizziness, seizures, facial asymmetry, speech difficulty and light-headedness.        L 2nd-4th toes subjectively numb   Psychiatric/Behavioral:  Positive for decreased concentration, dysphoric mood and sleep disturbance.        Objective:     Vitals:  Temp: 98.7 °F (37.1 °C)  Pulse: 79  Rhythm: normal sinus rhythm  BP: (!) 155/69  MAP (mmHg): 99  Resp: 14  SpO2: 96 %    Temp  Min: 98.7 °F (37.1 °C)  Max: 99.2 °F (37.3 °C)  Pulse  Min: 78  Max: 97  BP  Min: 136/64  Max: 182/79  MAP (mmHg)  Min: 90  Max: 127  Resp  Min: 14  Max: 43  SpO2  Min: 95 %  Max: 100 %    01/15 0701 - 01/16 0700  In: 2405.6 [P.O.:200; I.V.:1299.9]  Out: 2800 [Urine:2800]   Unmeasured Output  Urine Occurrence: 1  Stool Occurrence: 0  Pad Count: 3        Physical Exam  General Appearance: Not in acute hemodynamic distress  Mental Status Exam: awake, alert, aware, oriented, no aphasia, no dysarthria  Cranial Nerves: VFF, EOM full, pupils are equal and reactive to light bilaterally, symmetric facial sensory, symmetric facial motor, hearing grossly normal, midline tongue  Motor: no drift in the UE/LE. Power is 5/5 in both UE/LE. Normal tone.  Sensory: Symmetric to LT in all 4 limbs without extinction  Coordination: FTN without dysmetria, fine motor regular and fast  Vascular: S1/S2 of normal intensity, no S3/S4 appreciated, no murmurs appreciated  Lungs: CTA bilaterally without wheezing  Abdomen: Soft, non-distended, non-tender, BS +       Medications:  Continuous Scheduledenoxparin, 40 mg, Q24H (prophylaxis, 1700)  fludrocortisone, 100 mcg,  BID  levothyroxine, 25 mcg, Before breakfast  LIDOcaine, 1 patch, Q24H  methocarbamoL, 750 mg, QID  niMODipine, 60 mg, Q4H  polyethylene glycol, 17 g, Daily  pregabalin, 225 mg, BID  senna-docusate 8.6-50 mg, 1 tablet, Daily  sodium chloride, 2,000 mg, TID    PRNacetaminophen, 1,000 mg, Q8H PRN  bisacodyL, 10 mg, Daily PRN  dextrose 10%, 12.5 g, PRN  dextrose 10%, 25 g, PRN  glucagon (human recombinant), 1 mg, PRN  hydrALAZINE, 10 mg, Q4H PRN  insulin aspart U-100, 0-10 Units, QID (AC + HS) PRN  labetalol, 10 mg, Q4H PRN  magnesium oxide, 800 mg, PRN  magnesium oxide, 800 mg, PRN  morphine, 2 mg, Q2H PRN  ondansetron, 4 mg, Q6H PRN  oxyCODONE, 5 mg, Q6H PRN  potassium bicarbonate, 35 mEq, PRN  potassium bicarbonate, 50 mEq, PRN  potassium bicarbonate, 60 mEq, PRN  potassium, sodium phosphates, 2 packet, PRN  potassium, sodium phosphates, 2 packet, PRN  potassium, sodium phosphates, 2 packet, PRN      Today I personally reviewed pertinent imaging, laboratory results, notably: TCDs wnl, no evidence of radiographic vasospasm. CBC w/ resolution of prior leukocytosis, Hb/platelets stable. CMP w/ Na 136-140, transaminitis resolved     Diet  Diet Adult Regular (IDDSI Level 7)  Diet Adult Regular (IDDSI Level 7)

## 2024-01-16 NOTE — PT/OT/SLP PROGRESS
Speech Language Pathology Treatment    Patient Name:  Loreto Goff   MRN:  [id number]  Admitting Diagnosis: Anterior communicating artery aneurysm    Recommendations:                 General Recommendations:  Cognitive-linguistic therapy  Diet recommendations:  Regular Diet - IDDSI Level 7, Liquid Diet Level: Thin liquids - IDDSI Level 0   Aspiration Precautions: Standard aspiration precautions   General Precautions: Standard, fall  Communication strategies:  none    Assessment:     Loreto Goff is a 58 y.o. female with an SLP diagnosis of Cognitive-Linguistic Impairment.      Subjective     Pt reported constant headache, but able to tolerate to particiapte in cognitive tx.     Pain/Comfort:  Pain Rating 1:  (did not rate)  Location 1: head  Pain Addressed 1:  (nurse aware)    Respiratory Status: Room air    Objective:     Has the patient been evaluated by SLP for swallowing?   Yes  Keep patient NPO? No   Current Respiratory Status:        Pt was O x 4. Recall of general information was 80% accurate IND/100% given cues.  Pt followed 2-step commands with 100% accuracy. Given extended response time, she followed a 3-step command correctly. Pt stated possible causes for hypothetical situations with 80% accuracy IND/100% given cues.  During a word fluency task, pt listed 13 items in a category in one minute (15-20 is wnl). Education provided to pt regarding role of SLP, improving cognitive performance, and SLP treatment plan and POC.  Pt demonstrated understanding of education provided, but will benefit from continued reinforcement.       Goals:   Multidisciplinary Problems       SLP Goals          Problem: SLP    Goal Priority Disciplines Outcome   SLP Goal     SLP    Description: Speech Language Pathology Goals  Goals expected to be met by 1/19:  1. Pt will O x 4.   2. Pt will recall general information with 70% accuracy.   3. Following a delay, pt will recall 3/3 novel items with supervision.   4. Pt will answer  complex yes/no q's with 80% accuracy given mod cues.   5. Pt will follow 2-step commands with 70% accuracy given max cues.   6. Pt will answer simple problem solving q's with 70% accuracy given min cues.                                Plan:     Patient to be seen:  4 x/week   Plan of Care expires:  02/11/24  Plan of Care reviewed with:  patient   SLP Follow-Up:  Yes       Discharge recommendations:  High Intensity Therapy     Time Tracking:     SLP Treatment Date:   01/16/24  Speech Start Time:  1412  Speech Stop Time:  1444     Speech Total Time (min):  32 min    Billable Minutes: Speech Therapy Individual (cognitive therapy, 2 units) 24 and Self Care/Home Management Training 8    01/16/2024

## 2024-01-16 NOTE — ASSESSMENT & PLAN NOTE
57 y/o presents to [facility] with thunderclap HA. Per [facility], reportedly no acute intracranial abnormality on CT head, though upon review at bedside, area of questionable hyperdensity with A Comm aneurysm on CTA. LP performed at [facility] with reported xanthochromia, though not documented on paper chart. Further LP results from [facility] as follows CSF color, red; ROSEMARY CSF, bloody; CSF volume, 14; WBC, 31; RBC 51,000; Protein 64.8; Glucose 73; Gram stain without organisms seen.    S/p coiling on 1/11    -Admit to NCC  -NSGY, VN consulted  -Plan for repeat MRA on 1/18 (PBD #14) per NSGY recs  -q1h neuro checks, vital checks  -EKG, ECHO, CXR reviewed  -Daily CBC, CMP, mag, phos  -SBP < 220 given aneurysm secured, prn labetalol and hydralazine  -SCDs, lovenox qday for DVT ppx  -Nimotop 60q4h  -Daily TCDs -> no evidence of vasospasm to date  -Lyrica 225 mg BID, methocarbamol 750 mg QID for HA management   -Strict I&Os, goal euvolemia  -Bolus PRN to goal   -PT/OT

## 2024-01-16 NOTE — PT/OT/SLP PROGRESS
"Occupational Therapy   Treatment    Name: Loreto Goff  MRN: [id number]  Admitting Diagnosis:  Anterior communicating artery aneurysm       Recommendations:     Discharge Recommendations: High Intensity Therapy  Discharge Equipment Recommendations:   (TBD pending progress)  Barriers to discharge:  None    Assessment:     Loreto Goff is a 58 y.o. female with a medical diagnosis of Anterior communicating artery aneurysm.  She presents with the following performance deficits affecting function: weakness, impaired self care skills, impaired functional mobility, impaired cardiopulmonary response to activity, gait instability, impaired endurance, impaired balance. Pt continues to be limited by photophobia, headache, and dizziness. Pt is impulsive and requires cueing to avoid holding onto IV poles. Agreeable to sit up in bedside chair today, cleared by RN.    Rehab Prognosis:  Good; patient would benefit from acute skilled OT services to address these deficits and reach maximum level of function.       Plan:     Patient to be seen 4 x/week to address the above listed problems via self-care/home management, therapeutic activities, therapeutic exercises, neuromuscular re-education  Plan of Care Expires: 02/13/24  Plan of Care Reviewed with: patient    Subjective     Chief Complaint: headache  Patient/Family Comments/goals: "I'm so dizzy"  Pain/Comfort:  Pain Rating 1: 5/10  Location - Orientation 1: generalized  Location 1: head  Pain Addressed 1: Reposition, Distraction, Cessation of Activity, Nurse notified  Pain Rating Post-Intervention 1: 8/10    Objective:     Communicated with: RN prior to session.  Patient found HOB elevated with blood pressure cuff, peripheral IV, oxygen, pulse ox (continuous), telemetry upon OT entry to room.    General Precautions: Standard, aspiration, fall    Orthopedic Precautions:N/A  Braces: N/A  Respiratory Status: Room air     Occupational Performance:     Bed Mobility:    Patient completed " LOV 9/20/18. REFILL REQUEST VIA PHONE CALL FROM [facility]    Scooting/Bridging with stand by assistance  Patient completed Supine to Sit with stand by assistance  Patient completed Sit to Supine with stand by assistance     Functional Mobility/Transfers:  Patient completed Sit <> Stand Transfer with minimum assistance  with  hand-held assist and cues to keep eyes open  Patient completed Bed <> Chair Transfer using Step Transfer technique with moderate assistance with hand-held assist, cues to keep eyes open, and cues for pacing  Patient completed BSCTransfer Step Transfer technique with moderate assistance with  hand-held assist, cues to keep yes open, and cues for pacing      Activities of Daily Living:  Grooming: stand by assistance to complete hand hygiene seated EOB  Upper Body Dressing: minimum assistance to don gown as robe 2/2 pt with eyes closed throughout session and not paying attention  Lower Body Dressing: total assistance to don socks  Toileting: stand by assistance to toilet from [facility] 6 Click ADL: 18    Treatment & Education:  Pt educated on the following:  - role of OT and OT POC  - use of call light to request for assistance with all functional mobility to ensure safety during hospital stay  - importance of continued mobilization  - Safe transfer techniques and proper body mechanics for fall prevention and improved independence with functional transfers   - Importance of OOB activities to increase endurance and tolerance for increased participation in daily ADLs.    - Various therex pt can perform outside of therapy session to increase functional endurance and strength for overall improved independence in occupations of choice.   - benefits of continued participation in therapy.   - Pt educated on importance of calling for staff assist for functional mobility/transfers.  - All pt questions within OT scope of practice addressed, pt verbalized understanding.      Patient left up in chair with all lines intact, call button in reach, chair alarm on, and  RN notified    GOALS:   Multidisciplinary Problems       Occupational Therapy Goals          Problem: Occupational Therapy    Goal Priority Disciplines Outcome Interventions   Occupational Therapy Goal     OT, PT/OT Ongoing, Progressing    Description: Goals to be met by: 2/13/24     Patient will increase functional independence with ADLs by performing:    UE Dressing with Supervision.  LE Dressing with Contact Guard Assistance.  Grooming while seated with Stand-by Assistance.  Supine to sit with Minimal Assistance.                         Time Tracking:     OT Date of Treatment: 01/16/24  OT Start Time: 1050  OT Stop Time: 1114  OT Total Time (min): 24 min    Billable Minutes:Self Care/Home Management 14  Therapeutic Activity 10    OT/GENNARO: OT          1/16/2024

## 2024-01-16 NOTE — PROGRESS NOTES
[facility] - Neuro Critical Care  Neurocritical Care  Progress Note    Admit Date: 1/10/2024  Service Date: 01/15/2024  Length of Stay: 5    Subjective:     Chief Complaint: Anterior communicating artery aneurysm    History of Present Illness: Ms. Loreto Goff is a 57 y/o F with a PMHx of depression and hypothyroidism who presents to [facility] with thunderclap HA. She initially presented to [facility] on 01/10, but states that her symtoms consisting of neck pain radiating the front of her head, photophobia, and R leg numbness started on 01/05.  No acute intracranial abnormality on CT reported from [facility], though questionable area of hyperdensity upon review of disc at [facility].  A Comm aneurysm on CTA. LP performed at [facility] with reported xanthochromia. Further LP results from [facility] as follows CSF color, red; ROSEMARY CSF, bloody; CSF volume, 14; WBC, 31; RBC 51,000; Protein 64.8; Glucose 73; Gram stain without organisms seen. She will be admitted to [facility] for hourly neuro-monitoring and a higher level of care.       Hospital Course: 01/11: persistent headache, with sats in low 90s, use decadron rather then dilaudid, awaiting angio on cardene for sbp control   01/12: has bilateral 6th nerve palsy today which is more apparent, head CT unchanged, was difficult to examine yesterday due to presence of narcotics and headache  01/13/2024 Started on silodosin overnight for urinary retention requiring I&O cath. Worsening HA w transient L sided numbness overnight. CTH stable. TCDs pending. Persistent 10/10 HA. Gabapentin changed to lyrica 150 BID. 500NS bolus given for euvolemia.   01/14/2024 Improved HA overnight to 7/10, 10/10 again this AM, Increased lyrica to 225BID. Remains net -3L this AM despite aggressive fluid replacement overnight of 3L total, high UOP. Given 1L NS and started on fludrocortisone 100BID. Will reassess volume status at noon for further replacement. TCDs pending.  01/15/2024 Na briefly low at 132, uptrending to 137 on  recheck. C/o persistent HA and photophobia. D/c'd keppra given pt aneurysm secured. TCDs not done today, no evidence of vasospasm on 1/14 study.     Interval History:  Please see hospital course above for full details    Review of Systems   Constitutional:  Positive for appetite change and fatigue.   Eyes:  Positive for photophobia.   Respiratory: Negative.     Cardiovascular: Negative.    Gastrointestinal: Negative.    Musculoskeletal:  Positive for neck pain.   Neurological:  Positive for numbness and headaches. Negative for seizures, facial asymmetry, speech difficulty, weakness and light-headedness.        L 2nd-5th toe numbness   Psychiatric/Behavioral:  Positive for decreased concentration, dysphoric mood and sleep disturbance.        Objective:     Vitals:  Temp: 99 °F (37.2 °C)  Pulse: 87  Rhythm: normal sinus rhythm  BP: (!) 157/71  MAP (mmHg): 102  Resp: 18  SpO2: 96 %    Temp  Min: 98.9 °F (37.2 °C)  Max: 100 °F (37.8 °C)  Pulse  Min: 76  Max: 105  BP  Min: 118/57  Max: 193/80  MAP (mmHg)  Min: 82  Max: 119  Resp  Min: 12  Max: 43  SpO2  Min: 92 %  Max: 98 %    01/14 0701 - 01/15 0700  In: 5109 [I.V.:2282.9]  Out: 5550 [Urine:5550]   Unmeasured Output  Urine Occurrence: 1  Stool Occurrence: 0  Pad Count: 3        Physical Exam  General Appearance: Middle aged woman resting in bed, covering eyes, not in acute hemodynamic distress. Appears uncomfortable  Mental Status Exam: awake, alert, aware, oriented, no aphasia, no dysarthria. Irritable with examiner  Cranial Nerves: VFF, EOM full, pupils are equal and reactive to light bilaterally, symmetric facial sensory, symmetric facial motor, hearing grossly normal, midline tongue  Motor: no drift in the UE/LE. Power is 5/5 in both UE/LE. Normal tone.  Sensory: Symmetric to LT in all 4 limbs without extinction  Coordination: Unable to assess 2/2 poor pt cooperation   Vascular: S1/S2 of normal intensity, no S3/S4 appreciated, no murmurs appreciated  Lungs: CTA  bilaterally without wheezing  Abdomen: Soft, non-distended, non-tender, BS +         Medications:  ContinuousniCARdipine, Last Rate: Stopped (01/15/24 1107)    Scheduledenoxparin, 40 mg, Q24H (prophylaxis, 1700)  fludrocortisone, 100 mcg, BID  levothyroxine, 25 mcg, Before breakfast  LIDOcaine, 1 patch, Q24H  methocarbamoL, 500 mg, QID  mupirocin, , BID  niMODipine, 60 mg, Q4H  pregabalin, 225 mg, BID  senna-docusate 8.6-50 mg, 1 tablet, Daily  sodium chloride, 2,000 mg, TID    PRNacetaminophen, 1,000 mg, Q8H PRN  dextrose 10%, 12.5 g, PRN  dextrose 10%, 25 g, PRN  glucagon (human recombinant), 1 mg, PRN  hydrALAZINE, 10 mg, Q4H PRN  insulin aspart U-100, 0-10 Units, QID (AC + HS) PRN  labetalol, 10 mg, Q4H PRN  magnesium oxide, 800 mg, PRN  magnesium oxide, 800 mg, PRN  morphine, 2 mg, Q2H PRN  ondansetron, 4 mg, Q6H PRN  oxyCODONE, 5 mg, Q6H PRN  potassium bicarbonate, 35 mEq, PRN  potassium bicarbonate, 50 mEq, PRN  potassium bicarbonate, 60 mEq, PRN  potassium, sodium phosphates, 2 packet, PRN  potassium, sodium phosphates, 2 packet, PRN  potassium, sodium phosphates, 2 packet, PRN      Today I personally reviewed pertinent imaging, laboratory results, notably:    Diet  Diet Adult Regular (IDDSI Level 7)  Diet Adult Regular (IDDSI Level 7)      Assessment/Plan:     Neuro  * Anterior communicating artery aneurysm  59 y/o presents to [facility] with thunderclap HA. Per [facility], reportedly no acute intracranial abnormality on CT head, though upon review at bedside, area of questionable hyperdensity with A Comm aneurysm on CTA. LP performed at [facility] with reported xanthochromia, though not documented on paper chart. Further LP results from [facility] as follows CSF color, red; ROSEMARY CSF, bloody; CSF volume, 14; WBC, 31; RBC 51,000; Protein 64.8; Glucose 73; Gram stain without organisms seen.    S/p coiling on 1/11    -Admit to NCC  -NSGY, VN consulted  -q1h neuro checks, vital checks  -EKG, ECHO, CXR review  -Daily CBC, CMP, mag,  phos  -SBP < 220 given aneurysm secured, prn labetalol and hydralazine  -SCDs, lovenox qday for DVT ppx  -Keppra 500 bid -> d/c   -Nimotop 60q4h  -Daily TCDs -> no evidence of vasospasm to date  -Lyrica 225 mg BID, methocarbamol 500 mg QID for HA management   -Strict I&Os, goal euvolemia  -Bolus PRN to goal   -PT/OT    Psychiatric  Recurrent major depression in partial remission  Previously on Trintellix per paper chart, no longer taking    - Outpt f/u     Pulmonary  Mild intermittent asthma without complication  PRN duonebs    Cardiac/Vascular  Essential hypertension  Hx of    - Goal -220 mmHg during vasospasm period  - Hold home antihypertensives  - PRN labetalol, hydralazine    Renal/  Hyponatremia  Concern for mild CSW 2/2 SAH    - Monitor I&Os, goal euvolemia  - Monitor Na q6hrs, goal eunatremia  - C/w salt tabs 2 g TID, encourage PO salt intake     Urinary retention  Started on silodosin 4mg for urinary retention requiring I&O cath w 1.2L urine. Later found to be able to urinate on own but preferred straight cath as HA was too severe to get out of bed to bedside commode  Discontinued silodosin    Endocrine  Acquired hypothyroidism  Known history of thyroid cyst/goiter    - C/w home synthroid          The patient is being Prophylaxed for:  Venous Thromboembolism with: Mechanical or Chemical  Stress Ulcer with: None  Ventilator Pneumonia with: not applicable    Activity Orders            Diet Adult Regular (IDDSI Level 7): Regular starting at 01/11 1950    Turn patient starting at 01/10 2200    Elevate HOB starting at 01/10 2156          Full Code    Level III visit    Trudy Storey MD  Neurocritical Care  [facility] - Neuro Critical Care

## 2024-01-16 NOTE — PROGRESS NOTES
[facility] - Neuro Critical Care  Neurocritical Care  Progress Note    Admit Date: 1/10/2024  Service Date: 01/16/2024  Length of Stay: 6    Subjective:     Chief Complaint: Anterior communicating artery aneurysm    History of Present Illness: Ms. Loreto Goff is a 57 y/o F with a PMHx of depression and hypothyroidism who presents to [facility] with thunderclap HA. She initially presented to [facility] on 01/10, but states that her symtoms consisting of neck pain radiating the front of her head, photophobia, and R leg numbness started on 01/05.  No acute intracranial abnormality on CT reported from [facility], though questionable area of hyperdensity upon [place] [facility]  A Comm aneurysm on CTA. LP performed at [facility] with reported xanthochromia. Further LP results from [facility] as follows CSF color, red; ROSEMARY CSF, bloody; CSF volume, 14; WBC, 31; RBC 51,000; Protein 64.8; Glucose 73; Gram stain without organisms seen. She will be admitted to [facility] for hourly neuro-monitoring and a higher level of care.       Hospital Course: 01/11: persistent headache, with sats in low 90s, use decadron rather then dilaudid, awaiting angio on cardene for sbp control   01/12: has bilateral 6th nerve palsy today which is more apparent, head CT unchanged, was difficult to examine yesterday due to presence of narcotics and headache  01/13/2024 Started on silodosin overnight for urinary retention requiring I&O cath. Worsening HA w transient L sided numbness overnight. CTH stable. TCDs pending. Persistent 10/10 HA. Gabapentin changed to lyrica 150 BID. 500NS bolus given for euvolemia.   01/14/2024 Improved HA overnight to 7/10, 10/10 again this AM, Increased lyrica to 225BID. Remains net -3L this AM despite aggressive fluid replacement overnight of 3L total, high UOP. Given 1L NS and started on fludrocortisone 100BID. Will reassess volume status at noon for further replacement. TCDs pending.  01/15/2024 Na briefly low at 132, uptrending to 137 on  recheck. C/o persistent HA and photophobia. D/c'd keppra given pt aneurysm secured.   01/16/2024 Na stable overnight, space out checks to q12hrs. TCDs wnl. Methocarbamol increased to 750 mg QID given persistent HA    Interval History:  Please see hospital course above for full details    Review of Systems   Constitutional:  Positive for activity change, appetite change and fatigue. Negative for fever.   Respiratory: Negative.     Cardiovascular: Negative.    Gastrointestinal: Negative.    Musculoskeletal:  Positive for neck pain and neck stiffness.   Neurological:  Positive for numbness and headaches. Negative for dizziness, seizures, facial asymmetry, speech difficulty and light-headedness.        L 2nd-4th toes subjectively numb   Psychiatric/Behavioral:  Positive for decreased concentration, dysphoric mood and sleep disturbance.        Objective:     Vitals:  Temp: 98.7 °F (37.1 °C)  Pulse: 79  Rhythm: normal sinus rhythm  BP: (!) 155/69  MAP (mmHg): 99  Resp: 14  SpO2: 96 %    Temp  Min: 98.7 °F (37.1 °C)  Max: 99.2 °F (37.3 °C)  Pulse  Min: 78  Max: 97  BP  Min: 136/64  Max: 182/79  MAP (mmHg)  Min: 90  Max: 127  Resp  Min: 14  Max: 43  SpO2  Min: 95 %  Max: 100 %    01/15 0701 - 01/16 0700  In: 2405.6 [P.O.:200; I.V.:1299.9]  Out: 2800 [Urine:2800]   Unmeasured Output  Urine Occurrence: 1  Stool Occurrence: 0  Pad Count: 3        Physical Exam  General Appearance: Not in acute hemodynamic distress  Mental Status Exam: awake, alert, aware, oriented, no aphasia, no dysarthria  Cranial Nerves: VFF, EOM full, pupils are equal and reactive to light bilaterally, symmetric facial sensory, symmetric facial motor, hearing grossly normal, midline tongue  Motor: no drift in the UE/LE. Power is 5/5 in both UE/LE. Normal tone.  Sensory: Symmetric to LT in all 4 limbs without extinction  Coordination: FTN without dysmetria, fine motor regular and fast  Vascular: S1/S2 of normal intensity, no S3/S4 appreciated, no murmurs  appreciated  Lungs: CTA bilaterally without wheezing  Abdomen: Soft, non-distended, non-tender, BS +       Medications:  Continuous Scheduledenoxparin, 40 mg, Q24H (prophylaxis, 1700)  fludrocortisone, 100 mcg, BID  levothyroxine, 25 mcg, Before breakfast  LIDOcaine, 1 patch, Q24H  methocarbamoL, 750 mg, QID  niMODipine, 60 mg, Q4H  polyethylene glycol, 17 g, Daily  pregabalin, 225 mg, BID  senna-docusate 8.6-50 mg, 1 tablet, Daily  sodium chloride, 2,000 mg, TID    PRNacetaminophen, 1,000 mg, Q8H PRN  bisacodyL, 10 mg, Daily PRN  dextrose 10%, 12.5 g, PRN  dextrose 10%, 25 g, PRN  glucagon (human recombinant), 1 mg, PRN  hydrALAZINE, 10 mg, Q4H PRN  insulin aspart U-100, 0-10 Units, QID (AC + HS) PRN  labetalol, 10 mg, Q4H PRN  magnesium oxide, 800 mg, PRN  magnesium oxide, 800 mg, PRN  morphine, 2 mg, Q2H PRN  ondansetron, 4 mg, Q6H PRN  oxyCODONE, 5 mg, Q6H PRN  potassium bicarbonate, 35 mEq, PRN  potassium bicarbonate, 50 mEq, PRN  potassium bicarbonate, 60 mEq, PRN  potassium, sodium phosphates, 2 packet, PRN  potassium, sodium phosphates, 2 packet, PRN  potassium, sodium phosphates, 2 packet, PRN      Today I personally reviewed pertinent imaging, laboratory results, notably: TCDs wnl, no evidence of radiographic vasospasm. CBC w/ resolution of prior leukocytosis, Hb/platelets stable. CMP w/ Na 136-140, transaminitis resolved     Diet  Diet Adult Regular (IDDSI Level 7)  Diet Adult Regular (IDDSI Level 7)      Assessment/Plan:     Neuro  * Anterior communicating artery aneurysm  57 y/o presents to [facility] with thunderclap HA. Per [facility], reportedly no acute intracranial abnormality on CT head, though upon review at bedside, area of questionable hyperdensity with A Comm aneurysm on CTA. LP performed at [facility] with reported xanthochromia, though not documented on paper chart. Further LP results from [facility] as follows CSF color, red; ROSEMARY CSF, bloody; CSF volume, 14; WBC, 31; RBC 51,000; Protein 64.8; Glucose 73; Gram stain  without organisms seen.    S/p coiling on 1/11    -Admit to NCC  -NSGY, VN consulted  -Plan for repeat MRA on 1/18 (PBD #14) per NSGY recs  -q1h neuro checks, vital checks  -EKG, ECHO, CXR reviewed  -Daily CBC, CMP, mag, phos  -SBP < 220 given aneurysm secured, prn labetalol and hydralazine  -SCDs, lovenox qday for DVT ppx  -Nimotop 60q4h  -Daily TCDs -> no evidence of vasospasm to date  -Lyrica 225 mg BID, methocarbamol 750 mg QID for HA management   -Strict I&Os, goal euvolemia  -Bolus PRN to goal   -PT/OT    Thunderclap headache  Pain control as needed  See primary problem     Psychiatric  Recurrent major depression in partial remission  Previously on Trintellix per paper chart, no longer taking    - Outpt f/u     Cardiac/Vascular  Essential hypertension  Hx of    - Goal -220 mmHg during vasospasm period  - Hold home antihypertensives  - PRN labetalol, hydralazine    Renal/  Hyponatremia  Concern for mild CSW 2/2 SAH    - Monitor I&Os, goal euvolemia  - Improved overnight, space out checks to q12hrs  - Goal eunatremia  - C/w salt tabs 2 g TID, fludrocortisone 100 mcg BID, encourage PO salt intake     Endocrine  Acquired hypothyroidism  Known history of thyroid cyst/goiter    - C/w home synthroid          The patient is being Prophylaxed for:  Venous Thromboembolism with: Mechanical or Chemical  Stress Ulcer with: None  Ventilator Pneumonia with: not applicable    Activity Orders            Diet Adult Regular (IDDSI Level 7): Regular starting at 01/16 0913    Turn patient starting at 01/10 2200    Elevate HOB starting at 01/10 2156          Full Code    Level III visit    Trudy Storey MD  Neurocritical Care  [facility] - Neuro Critical Care

## 2024-01-16 NOTE — ASSESSMENT & PLAN NOTE
57 y/o presents to [facility] with thunderclap HA. Per [facility], reportedly no acute intracranial abnormality on CT head, though upon review at bedside, area of questionable hyperdensity with A Comm aneurysm on CTA. LP performed at [facility] with reported xanthochromia, though not documented on paper chart. Further LP results from [facility] as follows CSF color, red; ROSEMARY CSF, bloody; CSF volume, 14; WBC, 31; RBC 51,000; Protein 64.8; Glucose 73; Gram stain without organisms seen.    S/p coiling on 1/11    -Admit to [facility]  -NSGY, VN consulted  -q1h neuro checks, vital checks  -EKG, ECHO, CXR review  -Daily CBC, CMP, mag, phos  -SBP < 220 given aneurysm secured, prn labetalol and hydralazine  -SCDs, lovenox qday for DVT ppx  -Keppra 500 bid -> d/c   -Nimotop 60q4h  -Daily TCDs -> no evidence of vasospasm to date  -Lyrica 225 mg BID, methocarbamol 500 mg QID for HA management   -Strict I&Os, goal euvolemia  -Bolus PRN to goal   -PT/OT

## 2024-01-16 NOTE — PLAN OF CARE
OT POC reviewed, goals remain appropriate  Problem: Occupational Therapy  Goal: Occupational Therapy Goal  Description: Goals to be met by: 2/13/24     Patient will increase functional independence with ADLs by performing:    UE Dressing with Supervision.  LE Dressing with Contact Guard Assistance.  Grooming while seated with Stand-by Assistance.  Supine to sit with Minimal Assistance.    Outcome: Ongoing, Progressing

## 2024-01-16 NOTE — PROGRESS NOTES
[facility] - Neuro Critical Care  Neurosurgery  Progress Note    Subjective:     History of Present Illness: 59 yo F with PMH of hypothyroidism who presents as transfer from [facility] due to thunderclap headache that started suddenly on Friday, 6 days ago. She reports her headache has worsened more and more over the last week so she came to the hospital. She denies any illicit drug use or blood thinners. She reports her headache is a 10 out of 10. 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 Per report there was xanthochromia on LP performed at OSH on 11/10/24. NSGY consulted for aneurysm.     Post-Op Info:  * No surgery found *       Interval History: 1/16 naeon, worsening HA this morning but neuro exam is stable. Continue ICU care, plan for MRA w contrast with vessel wall imaging later this week    Medications:  Continuous Infusions:   niCARdipine Stopped (01/15/24 1107)     Scheduled Meds:   enoxparin  40 mg Subcutaneous Q24H (prophylaxis, 1700)    fludrocortisone  100 mcg Oral BID    levothyroxine  25 mcg Oral Before breakfast    LIDOcaine  1 patch Transdermal Q24H    methocarbamoL  500 mg Oral QID    mupirocin   Nasal BID    niMODipine  60 mg Oral Q4H    pregabalin  225 mg Oral BID    senna-docusate 8.6-50 mg  1 tablet Oral Daily    sodium chloride  2,000 mg Oral TID     PRN Meds:acetaminophen, dextrose 10%, dextrose 10%, glucagon (human recombinant), hydrALAZINE, insulin aspart U-100, labetalol, magnesium oxide, magnesium oxide, morphine, ondansetron, oxyCODONE, potassium bicarbonate, potassium bicarbonate, potassium bicarbonate, potassium, sodium phosphates, potassium, sodium phosphates, potassium, sodium phosphates     Review of Systems  Objective:     Weight: 97.1 kg (214 lb)  Body mass index is 33.52 kg/m².  Vital Signs (Most Recent):  Temp: 99 °F (37.2 °C) (01/16/24 0301)  Pulse: 82 (01/16/24 0601)  Resp: 17 (01/16/24 0602)  BP: (!) 165/81 (01/16/24  "0608)  SpO2: 100 % (01/16/24 0601) Vital Signs (24h Range):  Temp:  [99 °F (37.2 °C)-100 °F (37.8 °C)] 99 °F (37.2 °C)  Pulse:  [76-97] 82  Resp:  [15-43] 17  SpO2:  [93 %-100 %] 100 %  BP: (118-181)/(57-83) 165/81                                  Physical Exam         Neurosurgery Physical Exam    Constitutional:       Appearance: She is well-developed and well-nourished.   Eyes:      Extraocular Movements: EOM normal.      Conjunctiva/sclera: Conjunctivae normal.      Pupils: Pupils are equal, round, and reactive to light.   Cardiovascular:      Pulses: Normal pulses.   Abdominal:      Palpations: Abdomen is soft.   Neurological:      Mental Status: She is alert and oriented to person, place, and time.      Comments: [id number]  AAOx3  PERRL  Cranial nerves intact  Vision grossly normal, +photophobia  Patient follows commands all extremities; noncompliant with full exam  At least 4/5 strength in all extremities; non focal   SILT  DTRs normal     Significant Labs:  Recent Labs   Lab 01/15/24  0019 01/15/24  0611 01/16/24  0041 01/16/24  0437 01/16/24  0616   *  --   --  132*  --    *   < > 141 140 139   K 3.3*  --   --  3.9  --    CL 99  --   --  106  --    CO2 24  --   --  23  --    BUN 6  --   --  10  --    CREATININE 0.6  --   --  0.6  --    CALCIUM 8.7  --   --  9.2  --    MG 1.8  --   --  1.9  --     < > = values in this interval not displayed.       Recent Labs   Lab 01/15/24  0019 01/16/24  0437   WBC 15.14* 11.96   HGB 11.9* 11.9*   HCT 36.2* 36.9*    286       No results for input(s): "LABPT", "INR", "APTT" in the last 48 hours.  Microbiology Results (last 7 days)       ** No results found for the last 168 hours. **          All pertinent labs from the last 24 hours have been reviewed.    Significant Diagnostics:  CT: No results found in the last 24 hours.  MRI: No results found in the last 24 hours.  Assessment/Plan:     * Anterior communicating artery aneurysm  59 yo F with PMH of " hypothyroidism who presents as transfer from [facility] due to thunderclap headache that started suddenly on Friday, 6 days ago. She reports her headache has worsened more and more over the last week so she came to the hospital. She denies any illicit drug use or blood thinners. She reports her headache is a 10 out of 10.     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   Per report there was xanthochromia on LP performed at OSH on 11/10/24.     Presumably post bleed day 8  Coil embolization of Acomm 1/11    - admitted to [facility], q1h neuro checks  - SAH protocol // vasospasm watch   - keppra for seizure ppx   - Na > 135   - nimotop x21 days   - TCDs x14 days   - SBP < 180   - on room air, ctm  - strict I/O's; keep euvolemic to 1L net positive  - minimize narcotics for HA control  - hold any blood thinners  - rest of care per NCC; nsgy will continue to follow        Lb Mcintosh MD  Neurosurgery  [facility] - Neuro Critical Care

## 2024-01-17 LAB
ALBUMIN SERPL BCP-MCNC: 3.1 G/DL (ref 3.5–5.2)
ALP SERPL-CCNC: 71 U/L (ref 55–135)
ALT SERPL W/O P-5'-P-CCNC: 36 U/L (ref 10–44)
ANION GAP SERPL CALC-SCNC: 10 MMOL/L (ref 8–16)
AST SERPL-CCNC: 16 U/L (ref 10–40)
BASOPHILS # BLD AUTO: 0.06 K/UL (ref 0–0.2)
BASOPHILS NFR BLD: 0.4 % (ref 0–1.9)
BILIRUB SERPL-MCNC: 0.3 MG/DL (ref 0.1–1)
BUN SERPL-MCNC: 10 MG/DL (ref 6–20)
CALCIUM SERPL-MCNC: 9.9 MG/DL (ref 8.7–10.5)
CHLORIDE SERPL-SCNC: 106 MMOL/L (ref 95–110)
CO2 SERPL-SCNC: 22 MMOL/L (ref 23–29)
CREAT SERPL-MCNC: 0.6 MG/DL (ref 0.5–1.4)
DIFFERENTIAL METHOD BLD: ABNORMAL
EOSINOPHIL # BLD AUTO: 0.3 K/UL (ref 0–0.5)
EOSINOPHIL NFR BLD: 2.3 % (ref 0–8)
ERYTHROCYTE [DISTWIDTH] IN BLOOD BY AUTOMATED COUNT: 13.1 % (ref 11.5–14.5)
EST. GFR  (NO RACE VARIABLE): >60 ML/MIN/1.73 M^2
GLUCOSE SERPL-MCNC: 137 MG/DL (ref 70–110)
HCT VFR BLD AUTO: 35.1 % (ref 37–48.5)
HGB BLD-MCNC: 11.5 G/DL (ref 12–16)
IMM GRANULOCYTES # BLD AUTO: 0.06 K/UL (ref 0–0.04)
IMM GRANULOCYTES NFR BLD AUTO: 0.4 % (ref 0–0.5)
LYMPHOCYTES # BLD AUTO: 2.5 K/UL (ref 1–4.8)
LYMPHOCYTES NFR BLD: 17.6 % (ref 18–48)
MAGNESIUM SERPL-MCNC: 1.9 MG/DL (ref 1.6–2.6)
MCH RBC QN AUTO: 29 PG (ref 27–31)
MCHC RBC AUTO-ENTMCNC: 32.8 G/DL (ref 32–36)
MCV RBC AUTO: 89 FL (ref 82–98)
MONOCYTES # BLD AUTO: 1.6 K/UL (ref 0.3–1)
MONOCYTES NFR BLD: 11.6 % (ref 4–15)
NEUTROPHILS # BLD AUTO: 9.6 K/UL (ref 1.8–7.7)
NEUTROPHILS NFR BLD: 67.7 % (ref 38–73)
NRBC BLD-RTO: 0 /100 WBC
PHOSPHATE SERPL-MCNC: 4 MG/DL (ref 2.7–4.5)
PLATELET # BLD AUTO: 322 K/UL (ref 150–450)
PLATELET BLD QL SMEAR: ABNORMAL
PMV BLD AUTO: 10.5 FL (ref 9.2–12.9)
POCT GLUCOSE: 109 MG/DL (ref 70–110)
POCT GLUCOSE: 136 MG/DL (ref 70–110)
POCT GLUCOSE: 158 MG/DL (ref 70–110)
POTASSIUM SERPL-SCNC: 4.1 MMOL/L (ref 3.5–5.1)
PROT SERPL-MCNC: 6.7 G/DL (ref 6–8.4)
RBC # BLD AUTO: 3.96 M/UL (ref 4–5.4)
SODIUM SERPL-SCNC: 138 MMOL/L (ref 136–145)
SODIUM SERPL-SCNC: 139 MMOL/L (ref 136–145)
WBC # BLD AUTO: 14.15 K/UL (ref 3.9–12.7)

## 2024-01-17 PROCEDURE — 99233 SBSQ HOSP IP/OBS HIGH 50: CPT | Mod: ,,, | Performed by: PSYCHIATRY & NEUROLOGY

## 2024-01-17 PROCEDURE — 25000003 PHARM REV CODE 250: Performed by: PSYCHIATRY & NEUROLOGY

## 2024-01-17 PROCEDURE — 84295 ASSAY OF SERUM SODIUM: CPT | Performed by: NURSE PRACTITIONER

## 2024-01-17 PROCEDURE — 25000003 PHARM REV CODE 250

## 2024-01-17 PROCEDURE — 94761 N-INVAS EAR/PLS OXIMETRY MLT: CPT

## 2024-01-17 PROCEDURE — 84100 ASSAY OF PHOSPHORUS: CPT

## 2024-01-17 PROCEDURE — 85025 COMPLETE CBC W/AUTO DIFF WBC: CPT

## 2024-01-17 PROCEDURE — 63600175 PHARM REV CODE 636 W HCPCS: Performed by: PSYCHIATRY & NEUROLOGY

## 2024-01-17 PROCEDURE — 25000003 PHARM REV CODE 250: Performed by: NURSE PRACTITIONER

## 2024-01-17 PROCEDURE — 99233 SBSQ HOSP IP/OBS HIGH 50: CPT | Mod: ,,, | Performed by: NEUROLOGICAL SURGERY

## 2024-01-17 PROCEDURE — 20000000 HC ICU ROOM

## 2024-01-17 PROCEDURE — 83735 ASSAY OF MAGNESIUM: CPT

## 2024-01-17 PROCEDURE — 80053 COMPREHEN METABOLIC PANEL: CPT

## 2024-01-17 PROCEDURE — 63600175 PHARM REV CODE 636 W HCPCS

## 2024-01-17 RX ORDER — OXYCODONE HYDROCHLORIDE 10 MG/1
10 TABLET ORAL EVERY 6 HOURS PRN
Status: DISCONTINUED | OUTPATIENT
Start: 2024-01-17 | End: 2024-01-24 | Stop reason: HOSPADM

## 2024-01-17 RX ORDER — OXYCODONE HYDROCHLORIDE 5 MG/1
5 TABLET ORAL EVERY 4 HOURS PRN
Status: DISCONTINUED | OUTPATIENT
Start: 2024-01-17 | End: 2024-01-24 | Stop reason: HOSPADM

## 2024-01-17 RX ADMIN — LEVOTHYROXINE SODIUM 25 MCG: 25 TABLET ORAL at 06:01

## 2024-01-17 RX ADMIN — SODIUM CHLORIDE 2000 MG: 1 TABLET ORAL at 10:01

## 2024-01-17 RX ADMIN — SODIUM CHLORIDE 1000 ML: 9 INJECTION, SOLUTION INTRAVENOUS at 10:01

## 2024-01-17 RX ADMIN — METHOCARBAMOL TABLETS 750 MG: 750 TABLET, COATED ORAL at 12:01

## 2024-01-17 RX ADMIN — SODIUM CHLORIDE 2000 MG: 1 TABLET ORAL at 08:01

## 2024-01-17 RX ADMIN — NIMODIPINE 60 MG: 30 CAPSULE, LIQUID FILLED ORAL at 10:01

## 2024-01-17 RX ADMIN — PREGABALIN 225 MG: 150 CAPSULE ORAL at 08:01

## 2024-01-17 RX ADMIN — NIMODIPINE 60 MG: 30 CAPSULE, LIQUID FILLED ORAL at 02:01

## 2024-01-17 RX ADMIN — OXYCODONE HYDROCHLORIDE 10 MG: 10 TABLET ORAL at 01:01

## 2024-01-17 RX ADMIN — NIMODIPINE 60 MG: 30 CAPSULE, LIQUID FILLED ORAL at 01:01

## 2024-01-17 RX ADMIN — METHOCARBAMOL TABLETS 750 MG: 750 TABLET, COATED ORAL at 05:01

## 2024-01-17 RX ADMIN — SODIUM CHLORIDE 1000 ML: 9 INJECTION, SOLUTION INTRAVENOUS at 04:01

## 2024-01-17 RX ADMIN — NIMODIPINE 60 MG: 30 CAPSULE, LIQUID FILLED ORAL at 06:01

## 2024-01-17 RX ADMIN — ENOXAPARIN SODIUM 40 MG: 100 INJECTION SUBCUTANEOUS at 05:01

## 2024-01-17 RX ADMIN — PREGABALIN 225 MG: 150 CAPSULE ORAL at 10:01

## 2024-01-17 RX ADMIN — OXYCODONE HYDROCHLORIDE 10 MG: 10 TABLET ORAL at 10:01

## 2024-01-17 RX ADMIN — OXYCODONE HYDROCHLORIDE 5 MG: 5 TABLET ORAL at 12:01

## 2024-01-17 RX ADMIN — OXYCODONE HYDROCHLORIDE 5 MG: 5 TABLET ORAL at 06:01

## 2024-01-17 RX ADMIN — MORPHINE SULFATE 2 MG: 2 INJECTION, SOLUTION INTRAMUSCULAR; INTRAVENOUS at 10:01

## 2024-01-17 RX ADMIN — FLUDROCORTISONE ACETATE 50 MCG: 0.1 TABLET ORAL at 10:01

## 2024-01-17 RX ADMIN — METHOCARBAMOL TABLETS 750 MG: 750 TABLET, COATED ORAL at 10:01

## 2024-01-17 RX ADMIN — METHOCARBAMOL TABLETS 750 MG: 750 TABLET, COATED ORAL at 08:01

## 2024-01-17 RX ADMIN — SODIUM CHLORIDE 2000 MG: 1 TABLET ORAL at 03:01

## 2024-01-17 RX ADMIN — INSULIN ASPART 2 UNITS: 100 INJECTION, SOLUTION INTRAVENOUS; SUBCUTANEOUS at 05:01

## 2024-01-17 RX ADMIN — POLYETHYLENE GLYCOL 3350 17 G: 17 POWDER, FOR SOLUTION ORAL at 08:01

## 2024-01-17 RX ADMIN — ACETAMINOPHEN 1000 MG: 500 TABLET ORAL at 08:01

## 2024-01-17 RX ADMIN — ACETAMINOPHEN 1000 MG: 500 TABLET ORAL at 12:01

## 2024-01-17 RX ADMIN — FLUDROCORTISONE ACETATE 100 MCG: 0.1 TABLET ORAL at 08:01

## 2024-01-17 RX ADMIN — SENNOSIDES AND DOCUSATE SODIUM 1 TABLET: 50; 8.6 TABLET ORAL at 08:01

## 2024-01-17 RX ADMIN — NIMODIPINE 60 MG: 30 CAPSULE, LIQUID FILLED ORAL at 05:01

## 2024-01-17 NOTE — ASSESSMENT & PLAN NOTE
57 y/o presents to [facility] with thunderclap HA. Per [facility], reportedly no acute intracranial abnormality on CT head, though upon review at bedside, area of questionable hyperdensity with A Comm aneurysm on CTA. LP performed at [facility] with reported xanthochromia, though not documented on paper chart. Further LP results from [facility] as follows CSF color, red; ROSEMARY CSF, bloody; CSF volume, 14; WBC, 31; RBC 51,000; Protein 64.8; Glucose 73; Gram stain without organisms seen.    S/p coiling on 1/11    -Admit to NCC  -NSGY, VN consulted  -Plan for repeat MRA on 1/18 (PBD #14) per NSGY recs  -q1h neuro checks, vital checks  -EKG, ECHO, CXR reviewed  -Daily CBC, CMP, mag, phos  -SBP < 220 given aneurysm secured, prn labetalol and hydralazine  -SCDs, lovenox qday for DVT ppx  -Nimotop 60q4h  -Daily TCDs -> no evidence of vasospasm to date  -Lyrica 225 mg BID, methocarbamol 750 mg QID for HA management   -Strict I&Os, goal euvolemia  -Bolus PRN to goal   -PT/OT

## 2024-01-17 NOTE — PT/OT/SLP PROGRESS
Physical Therapy      Patient Name:  Loreto Goff   MRN:  [id number]    Patient not seen today secondary to pt undergoing TCD on PT attempt. RN requested a hold for today. Will follow-up as scheduled.

## 2024-01-17 NOTE — ASSESSMENT & PLAN NOTE
57 yo F with PMH of hypothyroidism who presents as transfer from [facility] due to thunderclap headache that started suddenly on Friday, 6 days ago. She reports her headache has worsened more and more over the last week so she came to the hospital. She denies any illicit drug use or blood thinners. She reports her headache is a 10 out of 10.     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   Per report there was xanthochromia on LP performed at OSH on 11/10/24.     Presumably post bleed day 8  Coil embolization of Acomm 1/11    - admitted to [facility], q1h neuro checks  - SAH protocol // vasospasm watch   - keppra for seizure ppx   - Na > 135   - nimotop x21 days   - TCDs x14 days   - SBP < 180   - on room air, ctm  - strict I/O's; keep euvolemic to 1L net positive  - minimize narcotics for HA control  - hold any blood thinners  - rest of care per NCC; nsgy will continue to follow  - MRA tomorrow

## 2024-01-17 NOTE — ASSESSMENT & PLAN NOTE
Loreto Goff is a 58 y.o. female with PMH of depression, hypothyroidism transferred from [facility] for higher level care of suspected SAH and acomm aneurysm. Initially presented to OSH with sudden onset thunderclap HA with associated neck pain, photophobia, and RLE numbness. Symptoms began 1/5/24 and HA progressively worsened since, prompting her to present to OSH ED 1/10/24. CTA with large acomm aneurysm; LP with CSF with reported xanthochromia. Repeat [place] at [facility] with small subtle SAH and IVH; 7 mm acomm aneurysm. Patient now s/p coil of aneurysm on 1/11/24.      TCD pending. Patient with severe refractory headache worse when sitting up, associated light sensitivity.  Repeat CT stable      -Hold AC/AP due to concern for SAH  -Statin not indicated for SAH  -nimotop 60q4h, daily TCDs  -SBP goal <140, maintain MAP >65; can liberalized BP if needed to prevent vasospasm  -VTE PPx: SCDs, start sq heparin now that aneurysm is secured

## 2024-01-17 NOTE — PT/OT/SLP PROGRESS
Speech Language Pathology      Loreto Goff  MRN: [id number]    Patient not seen today secondary to Nursing hold (Comment) (pt not appropriate to be seen for multiple reasons (undergoing TCDs, reporting 10/10 headache, irritable and agressive)). Will follow-up according to POC.

## 2024-01-17 NOTE — PLAN OF CARE
[facility] - Neuro Critical Care  Discharge Reassessment    Primary Care Provider: Rosa Sterling    Expected Discharge Date: 1/19/2024    Patient with vasospasm watch.    Per MD:  Presumably post bleed day 8  Coil embolization of Acomm 1/11     - admitted to [facility], q1h neuro checks  - SAH protocol // vasospasm watch         - keppra for seizure ppx         - Na > 135         - nimotop x21 days         - TCDs x14 days     Patient not medically ready for discharge.       Reassessment (most recent)       Discharge Reassessment - 01/17/24 0933          Discharge Reassessment    Assessment Type Discharge Planning Reassessment     Did the patient's condition or plan change since previous assessment? No     Communicated CLEO with patient/caregiver Date not available/Unable to determine     Discharge Plan A Rehab     Discharge Plan B Home with family;Home Health     DME Needed Upon Discharge  other (see comments)   tbsd    Transition of Care Barriers None     Why the patient remains in the hospital Requires continued medical care                     Discharge Plan A and Plan B have been determined by review of patient's clinical status, future medical and therapeutic needs, and coverage/benefits for post-acute care in coordination with multidisciplinary team members.      Cathi Salvador RN, CCRN-K, [facility]  Neuro-Critical Care   [phone]

## 2024-01-17 NOTE — PROGRESS NOTES
[facility] - Neuro Critical Care  Neurocritical Care  Progress Note    Admit Date: 1/10/2024  Service Date: 01/17/2024  Length of Stay: 7    Subjective:     Chief Complaint: Anterior communicating artery aneurysm    History of Present Illness: Ms. Loreto Goff is a 59 y/o F with a PMHx of depression and hypothyroidism who presents to [facility] with thunderclap HA. She initially presented to [facility] on 01/10, but states that her symtoms consisting of neck pain radiating the front of her head, photophobia, and R leg numbness started on 01/05.  No acute intracranial abnormality on CT reported from [facility], though questionable area of hyperdensity upon review of disc at [facility].  A Comm aneurysm on CTA. LP performed at [facility] with reported xanthochromia. Further LP results from [facility] as follows CSF color, red; ROSEMARY CSF, bloody; CSF volume, 14; WBC, 31; RBC 51,000; Protein 64.8; Glucose 73; Gram stain without organisms seen. She will be admitted to [facility] for hourly neuro-monitoring and a higher level of care.       Hospital Course: 01/11: persistent headache, with sats in low 90s, use decadron rather then dilaudid, awaiting angio on cardene for sbp control   01/12: has bilateral 6th nerve palsy today which is more apparent, head CT unchanged, was difficult to examine yesterday due to presence of narcotics and headache  01/13/2024 Started on silodosin overnight for urinary retention requiring I&O cath. Worsening HA w transient L sided numbness overnight. CTH stable. TCDs pending. Persistent 10/10 HA. Gabapentin changed to lyrica 150 BID. 500NS bolus given for euvolemia.   01/14/2024 Improved HA overnight to 7/10, 10/10 again this AM, Increased lyrica to 225BID. Remains net -3L this AM despite aggressive fluid replacement overnight of 3L total, high UOP. Given 1L NS and started on fludrocortisone 100BID. Will reassess volume status at noon for further replacement. TCDs pending.  01/15/2024 Na briefly low at 132, uptrending to 137 on  recheck. C/o persistent HA and photophobia. D/c'd keppra given pt aneurysm secured.   01/16/2024 Na stable overnight, space out checks to q12hrs. TCDs wnl. Methocarbamol increased to 750 mg QID given persistent HA  01/17/2024 NAEON, TCDs stable, serum Na stable. D/c'd IV morphine, transitioned to oral pain regimen     Interval History:  Please see hospital course above for full details    Review of Systems   Constitutional:  Positive for activity change and fatigue. Negative for appetite change and fever.   Eyes:  Positive for photophobia.   Respiratory: Negative.     Cardiovascular: Negative.    Gastrointestinal: Negative.    Musculoskeletal:  Positive for neck pain and neck stiffness.   Neurological:  Positive for numbness and headaches. Negative for dizziness, seizures, facial asymmetry and light-headedness.        L 2nd-4th toes persistently numb   Psychiatric/Behavioral:  Positive for decreased concentration, dysphoric mood and sleep disturbance.      Objective:     Vitals:  Temp: 98.6 °F (37 °C)  Pulse: 95  Rhythm: normal sinus rhythm  BP: (!) 152/85  MAP (mmHg): 115  Resp: 16  SpO2: 98 %    Temp  Min: 98.6 °F (37 °C)  Max: 99 °F (37.2 °C)  Pulse  Min: 71  Max: 99  BP  Min: 140/63  Max: 197/89  MAP (mmHg)  Min: 91  Max: 130  Resp  Min: 14  Max: 27  SpO2  Min: 94 %  Max: 99 %    01/16 0701 - 01/17 0700  In: 5357.9 [P.O.:2900]  Out: 5250 [Urine:5250]   Unmeasured Output  Urine Occurrence: 1  Stool Occurrence: 0  Pad Count: 3        Physical Exam  General Appearance: Not in acute hemodynamic distress, on cell phone at time of exam   Mental Status Exam: awake, alert, aware, oriented, no aphasia, no dysarthria  Cranial Nerves: VFF, EOM full, pupils are equal and reactive to light bilaterally, symmetric facial sensory, symmetric facial motor, hearing grossly normal, midline tongue  Motor: no drift in the UE/LE. Power is 5/5 in both UE/LE. Normal tone.  Sensory: Symmetric to LT in all 4 limbs without  extinction  Coordination: FTN without dysmetria, fine motor regular and fast  Vascular: S1/S2 of normal intensity, no S3/S4 appreciated, no murmurs appreciated  Lungs: CTA bilaterally without wheezing  Abdomen: Soft, non-distended, non-tender, BS +         Medications:  Continuous Scheduledenoxparin, 40 mg, Q24H (prophylaxis, 1700)  fludrocortisone, 100 mcg, BID  levothyroxine, 25 mcg, Before breakfast  LIDOcaine, 1 patch, Q24H  methocarbamoL, 750 mg, QID  niMODipine, 60 mg, Q4H  polyethylene glycol, 17 g, Daily  pregabalin, 225 mg, BID  senna-docusate 8.6-50 mg, 1 tablet, Daily  sodium chloride, 2,000 mg, TID    PRNacetaminophen, 1,000 mg, Q8H PRN  bisacodyL, 10 mg, Daily PRN  dextrose 10%, 12.5 g, PRN  dextrose 10%, 25 g, PRN  glucagon (human recombinant), 1 mg, PRN  hydrALAZINE, 10 mg, Q4H PRN  insulin aspart U-100, 0-10 Units, QID (AC + HS) PRN  labetalol, 10 mg, Q4H PRN  magnesium oxide, 800 mg, PRN  magnesium oxide, 800 mg, PRN  ondansetron, 4 mg, Q6H PRN  oxyCODONE, 5 mg, Q4H PRN  oxyCODONE, 10 mg, Q6H PRN  potassium bicarbonate, 35 mEq, PRN  potassium bicarbonate, 50 mEq, PRN  potassium bicarbonate, 60 mEq, PRN  potassium, sodium phosphates, 2 packet, PRN  potassium, sodium phosphates, 2 packet, PRN  potassium, sodium phosphates, 2 packet, PRN      Today I personally reviewed pertinent imaging, laboratory results, notably: TCDs stable from prior, borderline elevation in L MCA territory w/o clear radiographic vasospasm. CBC w/ mild leukocytosis to 14.15, Hb/platelets stable. CMP w/ stable Na at 139, otherwise unremarkable     Diet  Diet Adult Regular (IDDSI Level 7)  Diet Adult Regular (IDDSI Level 7)      Assessment/Plan:     Neuro  * Anterior communicating artery aneurysm  59 y/o presents to [facility] with thunderclap HA. Per OHS, reportedly no acute intracranial abnormality on CT head, though upon review at bedside, area of questionable hyperdensity with A Comm aneurysm on CTA. LP performed at [facility] with reported  xanthochromia, though not documented on paper chart. Further LP results from OHS as follows CSF color, red; ROSEMARY CSF, bloody; CSF volume, 14; WBC, 31; RBC 51,000; Protein 64.8; Glucose 73; Gram stain without organisms seen.    S/p coiling on 1/11    -Admit to NCC  -NSGY, VN consulted  -Plan for repeat MRA on 1/18 (PBD #14) per NSGY recs  -q1h neuro checks, vital checks  -EKG, ECHO, CXR reviewed  -Daily CBC, CMP, mag, phos  -SBP < 220 given aneurysm secured, prn labetalol and hydralazine  -SCDs, lovenox qday for DVT ppx  -Nimotop 60q4h  -Daily TCDs -> no evidence of vasospasm to date  -Lyrica 225 mg BID, methocarbamol 750 mg QID for HA management   -Strict I&Os, goal euvolemia  -Bolus PRN to goal   -PT/OT    Thunderclap headache  Pain control as needed  See primary problem     Psychiatric  Recurrent major depression in partial remission  Previously on Trintellix per paper chart, no longer taking    - Outpt f/u     Pulmonary  Mild intermittent asthma without complication  PRN duonebs    Cardiac/Vascular  Essential hypertension  Hx of    - Goal -220 mmHg during vasospasm period  - Hold home antihypertensives  - PRN labetalol, hydralazine    Renal/  Hyponatremia  Concern for mild CSW 2/2 SAH    - Monitor I&Os, goal euvolemia  - Improved overnight, space out checks to qday  - Goal eunatremia  - Decrease fludrocortisone 100 mcg BID -> 50 mcg BID  - C/w salt tabs 2 g TID, encourage PO salt intake     Endocrine  Acquired hypothyroidism  Known history of thyroid cyst/goiter    - C/w home synthroid          The patient is being Prophylaxed for:  Venous Thromboembolism with: Mechanical or Chemical  Stress Ulcer with: None  Ventilator Pneumonia with: not applicable    Activity Orders            Diet Adult Regular (IDDSI Level 7): Regular starting at 01/16 0913    Turn patient starting at 01/10 2200    Elevate HOB starting at 01/10 2156          Full Code    Level III visit    Trudy Storey MD  Neurocritical  Care  [facility] - Neuro Critical Care

## 2024-01-17 NOTE — CARE UPDATE
Patient's chart was reviewed by a stroke team provider and discussed with staff.     Briefly, Loreto Goff is a 58 y.o. female with PMH of depression, hypothyroidism transferred from [facility] for higher level of care for suspected SAH and acomm aneurysm. Initially presented to OSH with sudden onset thunderclap HA with associated neck pain, photophobia, and RLE numbness. Symptoms began 1/5/24 and HA progressively worsened since, prompting her to present to OSH ED 1/10/24. CTA with large acomm aneurysm; LP with CSF with reported xanthochromia. Repeat CTA at [facility] with small subtle SAH and IVH; 7 mm acomm aneurysm. Patient now s/p coil of aneurysm on 1/11/24. Per report there was xanthochromia on LP performed at OSH on 11/10/24.       Patient continue to be admitted on NCC with NSGY following for SAH protocol and vasospasm watch. Daily TCDs x14 days. AC on hold. MRA tomorrow. Rest of care per NCC and NSGY.     No new recommendations from VN/stroke standpoint, stroke team will sign off at this time. Thank you for your consult. Please do not hesitate to contact the stroke team/re-consult for any questions or concerns.

## 2024-01-17 NOTE — PLAN OF CARE
"[facility] Care Plan    POC reviewed with Loreto Goff and family at 0300. Pt verbalized understanding. Questions and concerns addressed. No acute events overnight. Pt progressing toward goals. Will continue to monitor. See below and flowsheets for full assessment and VS info.   -NAEON   -1L NS bolus given   -PRNs given for HA     Is this a stroke patient? yes- Stroke booklet reviewed with patient, risk factors identified for patient and stroke booklet remains at bedside for ongoing education.     Neuro:  [place] Coma Scale  Best Eye Response: 3-->(E3) to speech  Best Motor Response: 6-->(M6) obeys commands  Best Verbal Response: 5-->(V5) oriented  Fili Coma Scale Score: 14  Assessment Qualifiers: patient not sedated/intubated, no eye obstruction present  Pupil PERRLA: yes     24hr Temp:  [98.7 °F (37.1 °C)-99 °F (37.2 °C)]     CV:   Rhythm: normal sinus rhythm  BP goals:   SBP < 220  MAP > 65    Resp:           Plan: N/A    GI/:     Diet/Nutrition Received: regular  Last Bowel Movement: 01/13/24  Voiding Characteristics: voids spontaneously without difficulty    Intake/Output Summary (Last 24 hours) at 1/17/2024 0627  Last data filed at 1/17/2024 0552  Gross per 24 hour   Intake 5357.91 ml   Output 5250 ml   Net 107.91 ml     Unmeasured Output  Urine Occurrence: 1  Stool Occurrence: 0  Pad Count: 3    Labs/Accuchecks:  Recent Labs   Lab 01/17/24  0231   WBC 14.15*   RBC 3.96*   HGB 11.5*   HCT 35.1*         Recent Labs   Lab 01/17/24  0231      K 4.1   CO2 22*      BUN 10   CREATININE 0.6   ALKPHOS 71   ALT 36   AST 16   BILITOT 0.3      Recent Labs   Lab 01/11/24  0552   INR 1.0   APTT 24.0    No results for input(s): "CPK", "CPKMB", "TROPONINI", "MB" in the last 168 hours.    Electrolytes: N/A - electrolytes WDL  Accuchecks: ACHS    Gtts:      LDA/Wounds:  Lines/Drains/Airways       Peripheral Intravenous Line  Duration                  Peripheral IV - Single Lumen 01/16/24 0101 20 G " Anterior;Left Forearm 1 day         Peripheral IV - Single Lumen 01/16/24 2200 22 G Left;Posterior Hand <1 day                  Wounds: No  Wound care consulted: No    Problem: Adult Inpatient Plan of Care  Goal: Plan of Care Review  Flowsheets (Taken 1/17/2024 0522)  Plan of Care Reviewed With: patient     Problem: Adjustment to Illness (Stroke, Hemorrhagic)  Goal: Optimal Coping  Intervention: Support Psychosocial Response to Stroke  Flowsheets (Taken 1/17/2024 0522)  Supportive Measures:   active listening utilized   positive reinforcement provided   relaxation techniques promoted  Family/Support System Care: support provided     Problem: Cerebral Tissue Perfusion (Stroke, Hemorrhagic)  Goal: Optimal Cerebral Tissue Perfusion  Intervention: Protect and Optimize Cerebral Perfusion  Flowsheets (Taken 1/17/2024 0522)  Sensory Stimulation Regulation:   care clustered   lighting decreased   quiet environment promoted   visual stimulation minimized  Cerebral Perfusion Promotion: blood pressure monitored  Fluid/Electrolyte Management: fluids provided  Stabilization Measures: airway opened

## 2024-01-17 NOTE — ASSESSMENT & PLAN NOTE
Concern for mild CSW 2/2 SAH    - Monitor I&Os, goal euvolemia  - Improved overnight, space out checks to qday  - Goal eunatremia  - Decrease fludrocortisone 100 mcg BID -> 50 mcg BID  - C/w salt tabs 2 g TID, encourage PO salt intake

## 2024-01-17 NOTE — CARE UPDATE
Chart and imaging reviewed by Vascular Neurology provider and Vascular Neurology Attending, Dr. Marie.  See consult note dated 1/12/2024 for full recommendations.  No new recommendations at this time.      Thank you for including us in the care of this patient.  Vascular Neurology will sign off.  If the patient develops new symptoms concerning for acute stroke, please do not hesitate to re-consult.      Elsa White DNP, AGACNP-BC, FNP-C  [facility]  Department of Vascular Neurology   [facility]

## 2024-01-17 NOTE — PT/OT/SLP PROGRESS
Occupational Therapy      Patient Name:  Loreto Goff   MRN:  [id number]    Patient not seen today secondary to Nurse/ ROSEMARY hold and pt undergoing TCD's upon OT attempt. OT attempted at 1010. RN stated pt is uncooperative, irritable, in 10/10 pain, and to hold therapy for the day. Will follow-up as appropriate.    1/17/2024

## 2024-01-17 NOTE — PROGRESS NOTES
[facility] - Neuro Critical Care  Neurosurgery  Progress Note    Subjective:     History of Present Illness: 59 yo F with PMH of hypothyroidism who presents as transfer from [facility] due to thunderclap headache that started suddenly on Friday, 6 days ago. She reports her headache has worsened more and more over the last week so she came to the hospital. She denies any illicit drug use or blood thinners. She reports her headache is a 10 out of 10. 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 Per report there was xanthochromia on LP performed at OSH on 11/10/24. NSGY consulted for aneurysm.     Post-Op Info:  * No surgery found *       Interval History: 1/17 NAEON, pt slightly uncooperative this morning. TCDs 2.73/2.96.     Medications:  Continuous Infusions:  Scheduled Meds:   enoxparin  40 mg Subcutaneous Q24H (prophylaxis, 1700)    fludrocortisone  100 mcg Oral BID    levothyroxine  25 mcg Oral Before breakfast    LIDOcaine  1 patch Transdermal Q24H    methocarbamoL  750 mg Oral QID    niMODipine  60 mg Oral Q4H    polyethylene glycol  17 g Oral Daily    pregabalin  225 mg Oral BID    senna-docusate 8.6-50 mg  1 tablet Oral Daily    sodium chloride  2,000 mg Oral TID     PRN Meds:acetaminophen, bisacodyL, dextrose 10%, dextrose 10%, glucagon (human recombinant), hydrALAZINE, insulin aspart U-100, labetalol, magnesium oxide, magnesium oxide, morphine, ondansetron, oxyCODONE, potassium bicarbonate, potassium bicarbonate, potassium bicarbonate, potassium, sodium phosphates, potassium, sodium phosphates, potassium, sodium phosphates     Review of Systems  Objective:     Weight: 97.1 kg (214 lb)  Body mass index is 33.52 kg/m².  Vital Signs (Most Recent):  Temp: 98.7 °F (37.1 °C) (01/17/24 0301)  Pulse: 75 (01/17/24 0601)  Resp: 15 (01/17/24 0614)  BP: (!) 187/81 (01/17/24 0614)  SpO2: 97 % (01/17/24 0601) Vital Signs (24h Range):  Temp:  [98.7 °F (37.1 °C)-99 °F (37.2  "°C)] 98.7 °F (37.1 °C)  Pulse:  [75-99] 75  Resp:  [14-29] 15  SpO2:  [94 %-99 %] 97 %  BP: (140-187)/() 187/81                                 Physical Exam         Neurosurgery Physical Exam  Neurosurgery Physical Exam    General: well developed, well nourished, no distress.   HEENT: normocephalic, atraumatic  CV: regular rate   Pulmonary: normal respirations, no signs of respiratory distress  Abdomen: soft, non-distended, not tender to palpation  Skin: Skin is warm, dry and intact  Heme: no bruising    Neuro:   Mental Status: AO x4, no aphasia, no dysarthria   CN: PERRL, EOMI, VF intact to confrontation, sensation intact bilaterally, eyebrow raise and grimace symmetric, tongue midline  Motor: moves all extremities spontaneously, 4/5 throughout, no pronator drift   Sensory: intact to light touch throughout  Cerebellar: finger to nose intact bilaterally  Reflexes: -Rodriguez's, -Babinski, no clonus. Patellar: 2+ bilaterally     Significant Labs:  Recent Labs   Lab 01/16/24 0437 01/16/24  0616 01/16/24 2006 01/17/24  0231   *  --   --  137*    139 137 138   K 3.9  --   --  4.1     --   --  106   CO2 23  --   --  22*   BUN 10  --   --  10   CREATININE 0.6  --   --  0.6   CALCIUM 9.2  --   --  9.9   MG 1.9  --   --  1.9     Recent Labs   Lab 01/16/24 0437 01/17/24  0231   WBC 11.96 14.15*   HGB 11.9* 11.5*   HCT 36.9* 35.1*    322     No results for input(s): "LABPT", "INR", "APTT" in the last 48 hours.  Microbiology Results (last 7 days)       ** No results found for the last 168 hours. **          All pertinent labs from the last 24 hours have been reviewed.    Significant Diagnostics:  I have reviewed all pertinent imaging results/findings within the past 24 hours.  Assessment/Plan:     * Anterior communicating artery aneurysm  57 yo F with PMH of hypothyroidism who presents as transfer from [facility] due to thunderclap headache that started suddenly on Friday, 6 days ago. She " reports her headache has worsened more and more over the last week so she came to the hospital. She denies any illicit drug use or blood thinners. She reports her headache is a 10 out of 10.     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   Per report there was xanthochromia on LP performed at OSH on 11/10/24.     Presumably post bleed day 8  Coil embolization of Acomm 1/11    - admitted to [facility], q1h neuro checks  - SAH protocol // vasospasm watch   - keppra for seizure ppx   - Na > 135   - nimotop x21 days   - TCDs x14 days   - SBP < 180   - on room air, ctm  - strict I/O's; keep euvolemic to 1L net positive  - minimize narcotics for HA control  - hold any blood thinners  - rest of care per [facility]; nsgy will continue to follow  - MRA tomorrow        Christiano Hernandez MD  Neurosurgery  [facility] - Neuro Critical Care

## 2024-01-17 NOTE — SUBJECTIVE & OBJECTIVE
Interval History: 1/17 NAEON, pt slightly uncooperative this morning. TCDs 2.73/2.96.     Medications:  Continuous Infusions:  Scheduled Meds:   enoxparin  40 mg Subcutaneous Q24H (prophylaxis, 1700)    fludrocortisone  100 mcg Oral BID    levothyroxine  25 mcg Oral Before breakfast    LIDOcaine  1 patch Transdermal Q24H    methocarbamoL  750 mg Oral QID    niMODipine  60 mg Oral Q4H    polyethylene glycol  17 g Oral Daily    pregabalin  225 mg Oral BID    senna-docusate 8.6-50 mg  1 tablet Oral Daily    sodium chloride  2,000 mg Oral TID     PRN Meds:acetaminophen, bisacodyL, dextrose 10%, dextrose 10%, glucagon (human recombinant), hydrALAZINE, insulin aspart U-100, labetalol, magnesium oxide, magnesium oxide, morphine, ondansetron, oxyCODONE, potassium bicarbonate, potassium bicarbonate, potassium bicarbonate, potassium, sodium phosphates, potassium, sodium phosphates, potassium, sodium phosphates     Review of Systems  Objective:     Weight: 97.1 kg (214 lb)  Body mass index is 33.52 kg/m².  Vital Signs (Most Recent):  Temp: 98.7 °F (37.1 °C) (01/17/24 0301)  Pulse: 75 (01/17/24 0601)  Resp: 15 (01/17/24 0614)  BP: (!) 187/81 (01/17/24 0614)  SpO2: 97 % (01/17/24 0601) Vital Signs (24h Range):  Temp:  [98.7 °F (37.1 °C)-99 °F (37.2 °C)] 98.7 °F (37.1 °C)  Pulse:  [75-99] 75  Resp:  [14-29] 15  SpO2:  [94 %-99 %] 97 %  BP: (140-187)/() 187/81                                 Physical Exam         Neurosurgery Physical Exam  Neurosurgery Physical Exam    General: well developed, well nourished, no distress.   HEENT: normocephalic, atraumatic  CV: regular rate   Pulmonary: normal respirations, no signs of respiratory distress  Abdomen: soft, non-distended, not tender to palpation  Skin: Skin is warm, dry and intact  Heme: no bruising    Neuro:   Mental Status: AO x4, no aphasia, no dysarthria   CN: PERRL, EOMI, VF intact to confrontation, sensation intact bilaterally, eyebrow raise and grimace symmetric,  "tongue midline  Motor: moves all extremities spontaneously, 4/5 throughout, no pronator drift   Sensory: intact to light touch throughout  Cerebellar: finger to nose intact bilaterally  Reflexes: -Rodriguez's, -Babinski, no clonus. Patellar: 2+ bilaterally     Significant Labs:  Recent Labs   Lab 01/16/24 0437 01/16/24 0616 01/16/24 2006 01/17/24 0231   *  --   --  137*    139 137 138   K 3.9  --   --  4.1     --   --  106   CO2 23  --   --  22*   BUN 10  --   --  10   CREATININE 0.6  --   --  0.6   CALCIUM 9.2  --   --  9.9   MG 1.9  --   --  1.9     Recent Labs   Lab 01/16/24 0437 01/17/24 0231   WBC 11.96 14.15*   HGB 11.9* 11.5*   HCT 36.9* 35.1*    322     No results for input(s): "LABPT", "INR", "APTT" in the last 48 hours.  Microbiology Results (last 7 days)       ** No results found for the last 168 hours. **          All pertinent labs from the last 24 hours have been reviewed.    Significant Diagnostics:  I have reviewed all pertinent imaging results/findings within the past 24 hours.  "

## 2024-01-17 NOTE — SUBJECTIVE & OBJECTIVE
Interval History:  Please see hospital course above for full details    Review of Systems   Constitutional:  Positive for activity change and fatigue. Negative for appetite change and fever.   Eyes:  Positive for photophobia.   Respiratory: Negative.     Cardiovascular: Negative.    Gastrointestinal: Negative.    Musculoskeletal:  Positive for neck pain and neck stiffness.   Neurological:  Positive for numbness and headaches. Negative for dizziness, seizures, facial asymmetry and light-headedness.        L 2nd-4th toes persistently numb   Psychiatric/Behavioral:  Positive for decreased concentration, dysphoric mood and sleep disturbance.      Objective:     Vitals:  Temp: 98.6 °F (37 °C)  Pulse: 95  Rhythm: normal sinus rhythm  BP: (!) 152/85  MAP (mmHg): 115  Resp: 16  SpO2: 98 %    Temp  Min: 98.6 °F (37 °C)  Max: 99 °F (37.2 °C)  Pulse  Min: 71  Max: 99  BP  Min: 140/63  Max: 197/89  MAP (mmHg)  Min: 91  Max: 130  Resp  Min: 14  Max: 27  SpO2  Min: 94 %  Max: 99 %    01/16 0701 - 01/17 0700  In: 5357.9 [P.O.:2900]  Out: 5250 [Urine:5250]   Unmeasured Output  Urine Occurrence: 1  Stool Occurrence: 0  Pad Count: 3        Physical Exam  General Appearance: Not in acute hemodynamic distress, on cell phone at time of exam   Mental Status Exam: awake, alert, aware, oriented, no aphasia, no dysarthria  Cranial Nerves: VFF, EOM full, pupils are equal and reactive to light bilaterally, symmetric facial sensory, symmetric facial motor, hearing grossly normal, midline tongue  Motor: no drift in the UE/LE. Power is 5/5 in both UE/LE. Normal tone.  Sensory: Symmetric to LT in all 4 limbs without extinction  Coordination: FTN without dysmetria, fine motor regular and fast  Vascular: S1/S2 of normal intensity, no S3/S4 appreciated, no murmurs appreciated  Lungs: CTA bilaterally without wheezing  Abdomen: Soft, non-distended, non-tender, BS +         Medications:  Continuous Scheduledenoxparin, 40 mg, Q24H (prophylaxis,  1700)  fludrocortisone, 100 mcg, BID  levothyroxine, 25 mcg, Before breakfast  LIDOcaine, 1 patch, Q24H  methocarbamoL, 750 mg, QID  niMODipine, 60 mg, Q4H  polyethylene glycol, 17 g, Daily  pregabalin, 225 mg, BID  senna-docusate 8.6-50 mg, 1 tablet, Daily  sodium chloride, 2,000 mg, TID    PRNacetaminophen, 1,000 mg, Q8H PRN  bisacodyL, 10 mg, Daily PRN  dextrose 10%, 12.5 g, PRN  dextrose 10%, 25 g, PRN  glucagon (human recombinant), 1 mg, PRN  hydrALAZINE, 10 mg, Q4H PRN  insulin aspart U-100, 0-10 Units, QID (AC + HS) PRN  labetalol, 10 mg, Q4H PRN  magnesium oxide, 800 mg, PRN  magnesium oxide, 800 mg, PRN  ondansetron, 4 mg, Q6H PRN  oxyCODONE, 5 mg, Q4H PRN  oxyCODONE, 10 mg, Q6H PRN  potassium bicarbonate, 35 mEq, PRN  potassium bicarbonate, 50 mEq, PRN  potassium bicarbonate, 60 mEq, PRN  potassium, sodium phosphates, 2 packet, PRN  potassium, sodium phosphates, 2 packet, PRN  potassium, sodium phosphates, 2 packet, PRN      Today I personally reviewed pertinent imaging, laboratory results, notably: TCDs stable from prior, borderline elevation in L MCA territory w/o clear radiographic vasospasm. CBC w/ mild leukocytosis to 14.15, Hb/platelets stable. CMP w/ stable Na at 139, otherwise unremarkable     Diet  Diet Adult Regular (IDDSI Level 7)  Diet Adult Regular (IDDSI Level 7)

## 2024-01-18 LAB
ALBUMIN SERPL BCP-MCNC: 3.1 G/DL (ref 3.5–5.2)
ALP SERPL-CCNC: 102 U/L (ref 55–135)
ALT SERPL W/O P-5'-P-CCNC: 31 U/L (ref 10–44)
ANION GAP SERPL CALC-SCNC: 11 MMOL/L (ref 8–16)
AST SERPL-CCNC: 14 U/L (ref 10–40)
BASOPHILS # BLD AUTO: 0.06 K/UL (ref 0–0.2)
BASOPHILS NFR BLD: 0.4 % (ref 0–1.9)
BILIRUB SERPL-MCNC: 0.2 MG/DL (ref 0.1–1)
BUN SERPL-MCNC: 15 MG/DL (ref 6–20)
CALCIUM SERPL-MCNC: 9.8 MG/DL (ref 8.7–10.5)
CHLORIDE SERPL-SCNC: 105 MMOL/L (ref 95–110)
CO2 SERPL-SCNC: 21 MMOL/L (ref 23–29)
CREAT SERPL-MCNC: 0.6 MG/DL (ref 0.5–1.4)
DIFFERENTIAL METHOD BLD: ABNORMAL
EOSINOPHIL # BLD AUTO: 0.3 K/UL (ref 0–0.5)
EOSINOPHIL NFR BLD: 2.1 % (ref 0–8)
ERYTHROCYTE [DISTWIDTH] IN BLOOD BY AUTOMATED COUNT: 13.5 % (ref 11.5–14.5)
EST. GFR  (NO RACE VARIABLE): >60 ML/MIN/1.73 M^2
GLUCOSE SERPL-MCNC: 115 MG/DL (ref 70–110)
HCT VFR BLD AUTO: 35.6 % (ref 37–48.5)
HGB BLD-MCNC: 11.5 G/DL (ref 12–16)
IMM GRANULOCYTES # BLD AUTO: 0.07 K/UL (ref 0–0.04)
IMM GRANULOCYTES NFR BLD AUTO: 0.4 % (ref 0–0.5)
LYMPHOCYTES # BLD AUTO: 2.5 K/UL (ref 1–4.8)
LYMPHOCYTES NFR BLD: 15.8 % (ref 18–48)
MAGNESIUM SERPL-MCNC: 1.8 MG/DL (ref 1.6–2.6)
MCH RBC QN AUTO: 29.4 PG (ref 27–31)
MCHC RBC AUTO-ENTMCNC: 32.3 G/DL (ref 32–36)
MCV RBC AUTO: 91 FL (ref 82–98)
MONOCYTES # BLD AUTO: 1.7 K/UL (ref 0.3–1)
MONOCYTES NFR BLD: 10.5 % (ref 4–15)
NEUTROPHILS # BLD AUTO: 11.2 K/UL (ref 1.8–7.7)
NEUTROPHILS NFR BLD: 70.8 % (ref 38–73)
NRBC BLD-RTO: 0 /100 WBC
PHOSPHATE SERPL-MCNC: 4.1 MG/DL (ref 2.7–4.5)
PLATELET # BLD AUTO: 330 K/UL (ref 150–450)
PMV BLD AUTO: 10.6 FL (ref 9.2–12.9)
POCT GLUCOSE: 115 MG/DL (ref 70–110)
POCT GLUCOSE: 120 MG/DL (ref 70–110)
POCT GLUCOSE: 124 MG/DL (ref 70–110)
POCT GLUCOSE: 95 MG/DL (ref 70–110)
POTASSIUM SERPL-SCNC: 4.1 MMOL/L (ref 3.5–5.1)
PROT SERPL-MCNC: 7 G/DL (ref 6–8.4)
RBC # BLD AUTO: 3.91 M/UL (ref 4–5.4)
SODIUM SERPL-SCNC: 137 MMOL/L (ref 136–145)
WBC # BLD AUTO: 15.86 K/UL (ref 3.9–12.7)

## 2024-01-18 PROCEDURE — 94761 N-INVAS EAR/PLS OXIMETRY MLT: CPT

## 2024-01-18 PROCEDURE — 85025 COMPLETE CBC W/AUTO DIFF WBC: CPT

## 2024-01-18 PROCEDURE — 20000000 HC ICU ROOM

## 2024-01-18 PROCEDURE — 97129 THER IVNTJ 1ST 15 MIN: CPT

## 2024-01-18 PROCEDURE — 25000003 PHARM REV CODE 250: Performed by: PSYCHIATRY & NEUROLOGY

## 2024-01-18 PROCEDURE — 97535 SELF CARE MNGMENT TRAINING: CPT

## 2024-01-18 PROCEDURE — 97116 GAIT TRAINING THERAPY: CPT

## 2024-01-18 PROCEDURE — 63600175 PHARM REV CODE 636 W HCPCS: Performed by: PSYCHIATRY & NEUROLOGY

## 2024-01-18 PROCEDURE — 83735 ASSAY OF MAGNESIUM: CPT

## 2024-01-18 PROCEDURE — 25000003 PHARM REV CODE 250

## 2024-01-18 PROCEDURE — 97530 THERAPEUTIC ACTIVITIES: CPT

## 2024-01-18 PROCEDURE — 25000003 PHARM REV CODE 250: Performed by: NURSE PRACTITIONER

## 2024-01-18 PROCEDURE — 99233 SBSQ HOSP IP/OBS HIGH 50: CPT | Mod: ,,, | Performed by: PSYCHIATRY & NEUROLOGY

## 2024-01-18 PROCEDURE — 84100 ASSAY OF PHOSPHORUS: CPT

## 2024-01-18 PROCEDURE — 80053 COMPREHEN METABOLIC PANEL: CPT

## 2024-01-18 RX ORDER — FLUDROCORTISONE ACETATE 0.1 MG/1
100 TABLET ORAL DAILY
Status: DISCONTINUED | OUTPATIENT
Start: 2024-01-19 | End: 2024-01-19

## 2024-01-18 RX ORDER — HYDROXYZINE HYDROCHLORIDE 25 MG/1
25 TABLET, FILM COATED ORAL ONCE
Status: COMPLETED | OUTPATIENT
Start: 2024-01-18 | End: 2024-01-18

## 2024-01-18 RX ORDER — AMOXICILLIN 250 MG
2 CAPSULE ORAL 2 TIMES DAILY
Status: DISCONTINUED | OUTPATIENT
Start: 2024-01-18 | End: 2024-01-24 | Stop reason: HOSPADM

## 2024-01-18 RX ORDER — POLYETHYLENE GLYCOL 3350 17 G/17G
17 POWDER, FOR SOLUTION ORAL 2 TIMES DAILY
Status: DISCONTINUED | OUTPATIENT
Start: 2024-01-18 | End: 2024-01-24 | Stop reason: HOSPADM

## 2024-01-18 RX ADMIN — SENNOSIDES AND DOCUSATE SODIUM 2 TABLET: 50; 8.6 TABLET ORAL at 08:01

## 2024-01-18 RX ADMIN — METHOCARBAMOL TABLETS 750 MG: 750 TABLET, COATED ORAL at 05:01

## 2024-01-18 RX ADMIN — NIMODIPINE 60 MG: 30 CAPSULE, LIQUID FILLED ORAL at 09:01

## 2024-01-18 RX ADMIN — NIMODIPINE 60 MG: 30 CAPSULE, LIQUID FILLED ORAL at 01:01

## 2024-01-18 RX ADMIN — HYDROXYZINE HYDROCHLORIDE 25 MG: 25 TABLET, FILM COATED ORAL at 05:01

## 2024-01-18 RX ADMIN — METHOCARBAMOL TABLETS 750 MG: 750 TABLET, COATED ORAL at 08:01

## 2024-01-18 RX ADMIN — PREGABALIN 225 MG: 150 CAPSULE ORAL at 08:01

## 2024-01-18 RX ADMIN — OXYCODONE HYDROCHLORIDE 10 MG: 10 TABLET ORAL at 05:01

## 2024-01-18 RX ADMIN — ENOXAPARIN SODIUM 40 MG: 100 INJECTION SUBCUTANEOUS at 05:01

## 2024-01-18 RX ADMIN — SODIUM CHLORIDE 2000 MG: 1 TABLET ORAL at 08:01

## 2024-01-18 RX ADMIN — POLYETHYLENE GLYCOL 3350 17 G: 17 POWDER, FOR SOLUTION ORAL at 08:01

## 2024-01-18 RX ADMIN — ACETAMINOPHEN 1000 MG: 500 TABLET ORAL at 08:01

## 2024-01-18 RX ADMIN — NIMODIPINE 60 MG: 30 CAPSULE, LIQUID FILLED ORAL at 02:01

## 2024-01-18 RX ADMIN — FLUDROCORTISONE ACETATE 50 MCG: 0.1 TABLET ORAL at 10:01

## 2024-01-18 RX ADMIN — LEVOTHYROXINE SODIUM 25 MCG: 25 TABLET ORAL at 05:01

## 2024-01-18 RX ADMIN — METHOCARBAMOL TABLETS 750 MG: 750 TABLET, COATED ORAL at 01:01

## 2024-01-18 RX ADMIN — NIMODIPINE 60 MG: 30 CAPSULE, LIQUID FILLED ORAL at 05:01

## 2024-01-18 RX ADMIN — SODIUM CHLORIDE 2000 MG: 1 TABLET ORAL at 02:01

## 2024-01-18 RX ADMIN — NIMODIPINE 60 MG: 30 CAPSULE, LIQUID FILLED ORAL at 06:01

## 2024-01-18 RX ADMIN — FLUDROCORTISONE ACETATE 50 MCG: 0.1 TABLET ORAL at 09:01

## 2024-01-18 NOTE — PROGRESS NOTES
[facility]  Neurosurgery  Progress Note    Subjective:     History of Present Illness: 59 yo F with PMH of hypothyroidism who presents as transfer from [facility] due to thunderclap headache that started suddenly on Friday, 6 days ago. She reports her headache has worsened more and more over the last week so she came to the hospital. She denies any illicit drug use or blood thinners. She reports her headache is a 10 out of 10. 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 Per report there was xanthochromia on LP performed at OSH on 11/10/24. NSGY consulted for aneurysm.     Post-Op Info:  * No surgery found *       Interval History: NAEON, pt slightly uncooperative this morning. TCDs 2.73/2.96.     Medications:  Continuous Infusions:  Scheduled Meds:   enoxparin  40 mg Subcutaneous Q24H (prophylaxis, 1700)    fludrocortisone  50 mcg Oral BID    levothyroxine  25 mcg Oral Before breakfast    LIDOcaine  1 patch Transdermal Q24H    methocarbamoL  750 mg Oral QID    niMODipine  60 mg Oral Q4H    polyethylene glycol  17 g Oral Daily    pregabalin  225 mg Oral BID    senna-docusate 8.6-50 mg  1 tablet Oral Daily    sodium chloride  2,000 mg Oral TID     PRN Meds:acetaminophen, bisacodyL, dextrose 10%, dextrose 10%, glucagon (human recombinant), hydrALAZINE, insulin aspart U-100, labetalol, magnesium oxide, magnesium oxide, ondansetron, oxyCODONE, oxyCODONE, potassium bicarbonate, potassium bicarbonate, potassium bicarbonate, potassium, sodium phosphates, potassium, sodium phosphates, potassium, sodium phosphates     Review of Systems  Objective:     Weight: 97.1 kg (214 lb)  Body mass index is 33.52 kg/m².  Vital Signs (Most Recent):  Temp: 98.9 °F (37.2 °C) (01/18/24 0301)  Pulse: 95 (01/18/24 0601)  Resp: 17 (01/18/24 0601)  BP: (!) 147/72 (01/18/24 0601)  SpO2: 99 % (01/18/24 0601) Vital Signs (24h Range):  Temp:  [98.5 °F (36.9 °C)-98.9 °F (37.2 °C)]  "98.9 °F (37.2 °C)  Pulse:  [71-99] 95  Resp:  [13-27] 17  SpO2:  [94 %-99 %] 99 %  BP: (123-197)/(64-94) 147/72                                 Physical Exam         Neurosurgery Physical Exam  General: well developed, well nourished, no distress.   HEENT: normocephalic, atraumatic  CV: regular rate   Pulmonary: normal respirations, no signs of respiratory distress  Abdomen: soft, non-distended, not tender to palpation  Skin: Skin is warm, dry and intact  Heme: no bruising    Neuro:   Mental Status: AO x4, no aphasia, no dysarthria   CN: PERRL, EOMI, VF intact to confrontation, sensation intact bilaterally, eyebrow raise and grimace symmetric, tongue midline  Motor: moves all extremities spontaneously, 4/5 throughout, no pronator drift   Sensory: intact to light touch throughout  Cerebellar: finger to nose intact bilaterally  Reflexes: -Rodriguez's, -Babinski, no clonus. Patellar: 2+ bilaterally   Significant Labs:  Recent Labs   Lab 01/17/24  0231 01/17/24  0907 01/18/24  0007   *  --  115*    139 137   K 4.1  --  4.1     --  105   CO2 22*  --  21*   BUN 10  --  15   CREATININE 0.6  --  0.6   CALCIUM 9.9  --  9.8   MG 1.9  --  1.8     Recent Labs   Lab 01/17/24  0231 01/18/24  0007   WBC 14.15* 15.86*   HGB 11.5* 11.5*   HCT 35.1* 35.6*    330     No results for input(s): "LABPT", "INR", "APTT" in the last 48 hours.  Microbiology Results (last 7 days)       ** No results found for the last 168 hours. **          All pertinent labs from the last 24 hours have been reviewed.    Significant Diagnostics:  I have reviewed all pertinent imaging results/findings within the past 24 hours.  Assessment/Plan:     * Anterior communicating artery aneurysm  57 yo F with PMH of hypothyroidism who presents as transfer from [facility] due to thunderclap headache that started suddenly on Friday, 6 days ago. She reports her headache has worsened more and more over the last week so she came to the hospital. " She denies any illicit drug use or blood thinners. She reports her headache is a 10 out of 10.     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   Per report there was xanthochromia on LP performed at OSH on 11/10/24.     Presumably post bleed day 8  Coil embolization of Acomm 1/11    - admitted to [facility], q1h neuro checks  - SAH protocol // vasospasm watch   - keppra for seizure ppx   - Na > 135   - nimotop x21 days   - TCDs x14 days   - SBP < 180   - on room air, ctm  - strict I/O's; keep euvolemic to 1L net positive  - minimize narcotics for HA control  - hold any blood thinners  - rest of care per [facility]; nsgy will continue to follow  -Pt has cochlear implant. Need to determine if MRI compatible before MRA     -MRA pending clearance of device        Christiano Hernandez MD  Neurosurgery  [facility]

## 2024-01-18 NOTE — PLAN OF CARE
Recommendation/Intervention:   1) Continue current regular diet as tolerated, encourage PO intake, honor food preferences as able  2) Boost Plus daily to promote adequate kcal/protein consumption  3) RD to monitor weight, labs, PO intake/tolerance, skin     Goals: Meet % EEN, EPN by RD f/u date  Nutrition Goal Status: progressing towards goal  Communication of RD Recs: other (comment) (POC)

## 2024-01-18 NOTE — PROGRESS NOTES
"[facility]  Adult Nutrition  Progress Note    SUMMARY     Recommendation/Intervention:   1) Continue current regular diet as tolerated, encourage PO intake, honor food preferences as able  2) Boost Plus daily to promote adequate kcal/protein consumption  3) RD to monitor weight, labs, PO intake/tolerance, skin    Goals: Meet % EEN, EPN by RD f/u date  Nutrition Goal Status: progressing towards goal  Communication of RD Recs: other (comment) (POC)    Assessment and Plan    Nutrition Problem:  Inadequate energy intake     Related to (etiology):   Inability to consume sufficient energy      Signs and Symptoms (as evidenced by):   50% recent PO intake at meals     Interventions(treatment strategy):  Collaboration of nutrition care w/ other providers  Commercial beverage    Nutrition Diagnosis Status:   Continues    Reason for Assessment    Reason For Assessment: RD follow-up  Diagnosis: other (see comments) (Anterior communicating artery aneursym)  Relevant Medical History: depression and hypothyroidism  Interdisciplinary Rounds: did not attend  General Information Comments: Pt followed by RD team. 50% recent PO intake at meals. No N/V. LBM 1/13 per flowsheet - on bowel regimen. Pt w/ no indicators of malnutrition, NFPE not warranted.   Nutrition Discharge Planning: Adequate nutrition    Nutrition Risk Screen    Nutrition Risk Screen: no indicators present    Nutrition/Diet History    Spiritual, Cultural Beliefs, Druze Practices, Values that Affect Care: no  Food Allergies: NKFA  Factors Affecting Nutritional Intake: NPO    Anthropometrics    Temp: 97.4 °F (36.3 °C)  Height Method: Stated  Height: 5' 7" (170.2 cm)  Height (inches): 67 in  Weight Method: Bed Scale  Weight: 97.1 kg (214 lb)  Weight (lb): 214 lb  Ideal Body Weight (IBW), Female: 135 lb  % Ideal Body Weight, Female (lb): 158.52 %  BMI (Calculated): 33.5  BMI Grade: 30 - 34.9- obesity - grade " I    Lab/Procedures/Meds    Pertinent Labs Reviewed: reviewed  Pertinent Labs Comments: Hgb: 11.5, Hct: 35.6, Glucose: 115, Albumin: 3.1, HBA1C: 5.8  Pertinent Medications Reviewed: reviewed   enoxparin  40 mg Subcutaneous Q24H (prophylaxis, 1700)    fludrocortisone  50 mcg Oral BID    [START ON 1/19/2024] fludrocortisone  100 mcg Oral Daily    levothyroxine  25 mcg Oral Before breakfast    LIDOcaine  1 patch Transdermal Q24H    methocarbamoL  750 mg Oral QID    niMODipine  60 mg Oral Q4H    polyethylene glycol  17 g Oral BID    pregabalin  225 mg Oral BID    senna-docusate 8.6-50 mg  2 tablet Oral BID    sodium chloride  2,000 mg Oral TID     Estimated/Assessed Needs    Weight Used For Calorie Calculations: 97.4 kg (214 lb 11.7 oz)  Energy Calorie Requirements (kcal): 1756 kcal/d  Energy Need Method: Isabella-St Jeor (1.1 PAL)  Protein Requirements:  g/d (.9-1.1 g/kg)  Weight Used For Protein Calculations: 97.4 kg (214 lb 11.7 oz)     Estimated Fluid Requirement Method: other (see comments) (Per MD or 1 mL/kcal)  RDA Method (mL): 1756    Nutrition Prescription Ordered    Current Diet Order: Regular Diet  Nutrition Order Comments: Powerade at every meal. 50% recent PO intake  Oral Nutrition Supplement: None ordered at this time    Evaluation of Received Nutrient/Fluid Intake    I/O: - 3.6 L since admit  Energy Calories Required: not meeting needs  Protein Required: not meeting needs  Fluid Required: other (see comments) (As per MD)  Comments: LBM 1/13  Tolerance: tolerating  % Intake of Estimated Energy Needs: 50 - 75 %  % Meal Intake: 50 - 75 %    Nutrition Risk    Level of Risk/Frequency of Follow-up:  (1x/week)     Monitor and Evaluation    Food and Nutrient Intake: energy intake, food and beverage intake  Food and Nutrient Adminstration: diet order  Physical Activity and Function: nutrition-related ADLs and IADLs  Anthropometric Measurements: weight, weight change  Biochemical Data, Medical Tests and  Procedures: inflammatory profile, lipid profile, glucose/endocrine profile, gastrointestinal profile, electrolyte and renal panel  Nutrition-Focused Physical Findings: overall appearance     Nutrition Follow-Up    RD Follow-up?: Yes

## 2024-01-18 NOTE — PT/OT/SLP PROGRESS
Physical Therapy Treatment    Patient Name:  Loreto Goff   MRN:  [id number]    Recommendations:     Discharge Recommendations: High Intensity Therapy (Simultaneous filing. User may not have seen previous data.)  Discharge Equipment Recommendations: to be determined by next level of care  Barriers to discharge: None    Assessment:     Loreto Goff is a 58 y.o. female admitted with a medical diagnosis of Anterior communicating artery aneurysm.  She presents with the following impairments/functional limitations: weakness, impaired endurance, impaired self care skills, impaired functional mobility, gait instability, impaired balance, visual deficits, pain, decreased safety awareness Pt. Cooperative but had limited tolerance to treatment session due to severe HA, dizziness, visual disturbances.    Rehab Prognosis: Good; patient would benefit from acute skilled PT services to address these deficits and reach maximum level of function.    Recent Surgery: * No surgery found *      Plan:     During this hospitalization, patient to be seen 4 x/week to address the identified rehab impairments via gait training, therapeutic activities, therapeutic exercises, neuromuscular re-education and progress toward the following goals:    Plan of Care Expires:  02/13/24    Subjective     Chief Complaint: severe HA, dizziness, visual disturbances  Patient/Family Comments/goals: pt. Agreeable to PT  Pain/Comfort:  Pain Rating 1:  (pt. did not rate, but c/o severe HA  Simultaneous filing. User may not have seen previous data.)      Objective:     Communicated with nursing prior to session.  Patient found supine with blood pressure cuff, peripheral IV, telemetry upon PT entry to room.     General Precautions: Standard, fall (Simultaneous filing. User may not have seen previous data.)  Orthopedic Precautions: N/A  Braces: N/A  Respiratory Status: Room air     Functional Mobility:  Bed Mobility:     Rolling Left:  contact guard  assistance  Scooting: contact guard assistance  Supine to Sit: contact guard assistance  Transfers:     Sit to Stand:  minimum assistance with rolling walker  Gait: 12' and 15' with RW and Min-Mod A for balance/safety/RW management. Pt. Amb. With decreased step length/karson inconsistent foot placement and pushing RW too far in front of her. Pt. Had seated rest break between gait trials.  Balance: fair sitting and poor standing      AM-PAC 6 CLICK MOBILITY  Turning over in bed (including adjusting bedclothes, sheets and blankets)?: 3  Sitting down on and standing up from a chair with arms (e.g., wheelchair, bedside commode, etc.): 3  Moving from lying on back to sitting on the side of the bed?: 3  Moving to and from a bed to a chair (including a wheelchair)?: 3  Need to walk in hospital room?: 2  Climbing 3-5 steps with a railing?: 2  Basic Mobility Total Score: 16       Treatment & Education:  Discussed pt.'s progress, goals, and POC. Assisted on/off toilet.     Patient left up in chair with all lines intact, call button in reach, and chair alarm on..    GOALS:   Multidisciplinary Problems       Physical Therapy Goals          Problem: Physical Therapy    Goal Priority Disciplines Outcome Goal Variances Interventions   Physical Therapy Goal     PT, PT/OT Ongoing, Progressing     Description: Goals to be met by: 24     Patient will increase functional independence with mobility by performin. Supine to sit with Modified Somis  2. Sit to supine with Modified Somis  3. Sit to stand transfer with Modified [place]is  4. Bed to chair transfer with Stand-by Assistance using LRAD as needed  5. Gait  x 150 feet with Stand-by Assistance using LRAD as needed.   6. Ascend/descend 2 stair with no Handrails and Stand-by Assistance  7. Lower extremity exercise program x15 reps per handout, with assistance as needed                         Time Tracking:     PT Received On: 24  PT Start Time:  1049     PT Stop Time: 1127  PT Total Time (min): 38 min     Billable Minutes: Gait Training 23 and Therapeutic Activity 15    Treatment Type: Treatment  PT/PTA: PT     Number of PTA visits since last PT visit: 0     01/18/2024

## 2024-01-18 NOTE — PT/OT/SLP PROGRESS
"Speech Language Pathology Treatment    Patient Name:  Loreto Goff   MRN:  [id number]  Admitting Diagnosis: Anterior communicating artery aneurysm    Recommendations:                 General Recommendations:  Cognitive-linguistic therapy  Diet recommendations:  Regular Diet - IDDSI Level 7, Liquid Diet Level: Thin liquids - IDDSI Level 0   Aspiration Precautions: Monitor for s/s of aspiration and Standard aspiration precautions   General Precautions: Standard, fall  Communication strategies:  none    Assessment:     Loreto Goff is a 58 y.o. female with an SLP diagnosis of Cognitive-Linguistic Impairment.      Subjective     "I want to go home." "I want the dizziness to go away." Nurse notified of c/o dizziness.    Pain/Comfort:  Pain Rating 1:  (did not rate)  Location 1: sacral spine  Pain Addressed 1: Reposition, Nurse notified    Respiratory Status: Room air    Objective:     Has the patient been evaluated by SLP for swallowing?   Yes  Keep patient NPO? No   Current Respiratory Status:        Pt seen for ongoing cognitive tx while sitting semi-reclined in bedside chair.  Pt appearing to tolerate constant headache better at time of session. Pt reporting dizziness and later experience pain radiating down sacral spine. Pain was alleviated by reclining further in chair. RN notified. Pt wore eyeglasses to participate in reading assessment. Pt to read multiple verses from bible with good ability and fluency. No evidence of visual scanning or reading deficits.  Pt was able to solve functional math word problems related to time and money with 60% accuracy IND/100% given cues.  Education was provided to pt regarding role of SLP, assessment of reading and higher level cognition, improved cognition since initial evaluation, and SLP treatment plan and POC. Pt expressed understanding.     Goals:   Multidisciplinary Problems       SLP Goals          Problem: SLP    Goal Priority Disciplines Outcome   SLP Goal     SLP  "   Description: Speech Language Pathology Goals  Updated goals expected to be met 1/24:   1. Following a delay, pt will recall 3/3 novel items with supervision.   2. Pt will complete math/money/time management tasks with 80% accuracy given min cues.     Goals expected to be met by 1/19:  1. Pt will O x 4. Goal met  2. Pt will recall general information with 70% accuracy. Goal met  3. Following a delay, pt will recall 3/3 novel items with supervision. ongoing  4. Pt will answer complex yes/no q's with 80% accuracy given mod cues. Goal met  5. Pt will follow 2-step commands with 70% accuracy given max cues. Goal met  6. Pt will answer simple problem solving q's with 70% accuracy given min cues. Goal met                               Plan:     Patient to be seen:  3 x/week   Plan of Care expires:  02/11/24  Plan of Care reviewed with:  patient   SLP Follow-Up:  Yes       Discharge recommendations:  High Intensity Therapy     Time Tracking:     SLP Treatment Date:   01/18/24  Speech Start Time:  1140  Speech Stop Time:  1201     Speech Total Time (min):  21 min    Billable Minutes: Speech Therapy Individual (cognitive therapy) 13 and Self Care/Home Management Training 8    01/18/2024

## 2024-01-18 NOTE — SUBJECTIVE & OBJECTIVE
Interval History: NAEON, pt slightly uncooperative this morning. TCDs 2.73/2.96.     Medications:  Continuous Infusions:  Scheduled Meds:   enoxparin  40 mg Subcutaneous Q24H (prophylaxis, 1700)    fludrocortisone  50 mcg Oral BID    levothyroxine  25 mcg Oral Before breakfast    LIDOcaine  1 patch Transdermal Q24H    methocarbamoL  750 mg Oral QID    niMODipine  60 mg Oral Q4H    polyethylene glycol  17 g Oral Daily    pregabalin  225 mg Oral BID    senna-docusate 8.6-50 mg  1 tablet Oral Daily    sodium chloride  2,000 mg Oral TID     PRN Meds:acetaminophen, bisacodyL, dextrose 10%, dextrose 10%, glucagon (human recombinant), hydrALAZINE, insulin aspart U-100, labetalol, magnesium oxide, magnesium oxide, ondansetron, oxyCODONE, oxyCODONE, potassium bicarbonate, potassium bicarbonate, potassium bicarbonate, potassium, sodium phosphates, potassium, sodium phosphates, potassium, sodium phosphates     Review of Systems  Objective:     Weight: 97.1 kg (214 lb)  Body mass index is 33.52 kg/m².  Vital Signs (Most Recent):  Temp: 98.9 °F (37.2 °C) (01/18/24 0301)  Pulse: 95 (01/18/24 0601)  Resp: 17 (01/18/24 0601)  BP: (!) 147/72 (01/18/24 0601)  SpO2: 99 % (01/18/24 0601) Vital Signs (24h Range):  Temp:  [98.5 °F (36.9 °C)-98.9 °F (37.2 °C)] 98.9 °F (37.2 °C)  Pulse:  [71-99] 95  Resp:  [13-27] 17  SpO2:  [94 %-99 %] 99 %  BP: (123-197)/(64-94) 147/72                                 Physical Exam         Neurosurgery Physical Exam  General: well developed, well nourished, no distress.   HEENT: normocephalic, atraumatic  CV: regular rate   Pulmonary: normal respirations, no signs of respiratory distress  Abdomen: soft, non-distended, not tender to palpation  Skin: Skin is warm, dry and intact  Heme: no bruising    Neuro:   Mental Status: AO x4, no aphasia, no dysarthria   CN: PERRL, EOMI, VF intact to confrontation, sensation intact bilaterally, eyebrow raise and grimace symmetric, tongue midline  Motor: moves all  "extremities spontaneously, 4/5 throughout, no pronator drift   Sensory: intact to light touch throughout  Cerebellar: finger to nose intact bilaterally  Reflexes: -Rodriguez's, -Babinski, no clonus. Patellar: 2+ bilaterally   Significant Labs:  Recent Labs   Lab 01/17/24  0231 01/17/24  0907 01/18/24  0007   *  --  115*    139 137   K 4.1  --  4.1     --  105   CO2 22*  --  21*   BUN 10  --  15   CREATININE 0.6  --  0.6   CALCIUM 9.9  --  9.8   MG 1.9  --  1.8     Recent Labs   Lab 01/17/24 0231 01/18/24  0007   WBC 14.15* 15.86*   HGB 11.5* 11.5*   HCT 35.1* 35.6*    330     No results for input(s): "LABPT", "INR", "APTT" in the last 48 hours.  Microbiology Results (last 7 days)       ** No results found for the last 168 hours. **          All pertinent labs from the last 24 hours have been reviewed.    Significant Diagnostics:  I have reviewed all pertinent imaging results/findings within the past 24 hours.  "

## 2024-01-18 NOTE — ASSESSMENT & PLAN NOTE
59 yo F with PMH of hypothyroidism who presents as transfer from [facility] due to thunderclap headache that started suddenly on Friday, 6 days ago. She reports her headache has worsened more and more over the last week so she came to the hospital. She denies any illicit drug use or blood thinners. She reports her headache is a 10 out of 10.     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   Per report there was xanthochromia on LP performed at OSH on 11/10/24.     Presumably post bleed day 8  Coil embolization of Acomm 1/11    - admitted to [facility], q1h neuro checks  - SAH protocol // vasospasm watch   - keppra for seizure ppx   - Na > 135   - nimotop x21 days   - TCDs x14 days   - SBP < 180   - on room air, ctm  - strict I/O's; keep euvolemic to 1L net positive  - minimize narcotics for HA control  - hold any blood thinners  - rest of care per NCC; nsgy will continue to follow  -Pt has cochlear implant. Need to determine if MRI compatible before MRA     -MRA pending clearance of device

## 2024-01-18 NOTE — PLAN OF CARE
"[facility] Care Plan    POC reviewed with Loreto Goff and family at 1400. Pt verbalized understanding. Questions and concerns addressed. No acute events today. Pt progressing toward goals. Will continue to monitor. See below and flowsheets for full assessment and VS info.   -last TCD done today  -awaiting MRA  -probable transfer out of ICU after MRA          Is this a stroke patient? no    Neuro:  Fili Coma Scale  Best Eye Response: 4-->(E4) spontaneous  Best Motor Response: 6-->(M6) obeys commands  Best Verbal Response: 5-->(V5) oriented  [place] Coma Scale Score: 15  Assessment Qualifiers: patient not sedated/intubated  Pupil PERRLA: yes     24 hr Temp:  [97.4 °F (36.3 °C)-98.9 °F (37.2 °C)]     CV:   Rhythm: normal sinus rhythm  BP goals:   SBP < 220  MAP > 65    Resp:           Plan: N/A    GI/:     Diet/Nutrition Received: regular  Last Bowel Movement: 01/13/24  Voiding Characteristics: voids spontaneously without difficulty    Intake/Output Summary (Last 24 hours) at 1/18/2024 1644  Last data filed at 1/18/2024 1601  Gross per 24 hour   Intake 3762.69 ml   Output 3250 ml   Net 512.69 ml     Unmeasured Output  Urine Occurrence: 1  Stool Occurrence: 0  Pad Count: 3    Labs/Accuchecks:  Recent Labs   Lab 01/18/24  0007   WBC 15.86*   RBC 3.91*   HGB 11.5*   HCT 35.6*         Recent Labs   Lab 01/18/24  0007      K 4.1   CO2 21*      BUN 15   CREATININE 0.6   ALKPHOS 102   ALT 31   AST 14   BILITOT 0.2    No results for input(s): "PROTIME", "INR", "APTT", "HEPANTIXA" in the last 168 hours. No results for input(s): "CPK", "CPKMB", "TROPONINI", "MB" in the last 168 hours.    Electrolytes: N/A - electrolytes WDL  Accuchecks: ACHS    Gtts:      LDA/Wounds:  Lines/Drains/Airways       Peripheral Intravenous Line  Duration                  Peripheral IV - Single Lumen 01/16/24 0101 20 G Anterior;Left Forearm 2 days                  Wounds:        Wound care consulted: No   "

## 2024-01-18 NOTE — PROGRESS NOTES
[facility] - Neuro Critical Care  Neurocritical Care  Progress Note    Admit Date: 1/10/2024  Service Date: 01/18/2024  Length of Stay: 8    Subjective:     Chief Complaint: Anterior communicating artery aneurysm    History of Present Illness: Ms. Loreto Goff is a 57 y/o F with a PMHx of depression and hypothyroidism who presents to [facility] with thunderclap HA. She initially presented to [facility] on 01/10, but states that her symtoms consisting of neck pain radiating the front of her head, photophobia, and R leg numbness started on 01/05.  No acute intracranial abnormality on CT reported from [facility], though questionable area of hyperdensity upon review of disc at [facility].  A Comm aneurysm on CTA. LP performed at [facility] with reported xanthochromia. Further LP results from [facility] as follows CSF color, red; ROSEMARY CSF, bloody; CSF volume, 14; WBC, 31; RBC 51,000; Protein 64.8; Glucose 73; Gram stain without organisms seen. She will be admitted to [facility] for hourly neuro-monitoring and a higher level of care.       Hospital Course: 01/11: persistent headache, with sats in low 90s, use decadron rather then dilaudid, awaiting angio on cardene for sbp control   01/12: has bilateral 6th nerve palsy today which is more apparent, head CT unchanged, was difficult to examine yesterday due to presence of narcotics and headache  01/13/2024 Started on silodosin overnight for urinary retention requiring I&O cath. Worsening HA w transient L sided numbness overnight. CTH stable. TCDs pending. Persistent 10/10 HA. Gabapentin changed to lyrica 150 BID. 500NS bolus given for euvolemia.   01/14/2024 Improved HA overnight to 7/10, 10/10 again this AM, Increased lyrica to 225BID. Remains net -3L this AM despite aggressive fluid replacement overnight of 3L total, high UOP. Given 1L NS and started on fludrocortisone 100BID. Will reassess volume status at noon for further replacement. TCDs pending.  01/15/2024 Na briefly low at 132, uptrending to 137 on  recheck. C/o persistent HA and photophobia. D/c'd keppra given pt aneurysm secured.   01/16/2024 Na stable overnight, space out checks to q12hrs. TCDs wnl. Methocarbamol increased to 750 mg QID given persistent HA  01/17/2024 NAEON, TCDs stable, serum Na stable. D/c'd IV morphine, transitioned to oral pain regimen   01/18/2024 PBD 14. TCDs pending. MRA w vessel wall imaging pending clearance of cochlear implant.     Interval History: Please see hospital course above for full details    Review of Systems   Constitutional:  Positive for activity change and fatigue. Negative for appetite change and fever.   Eyes:  Positive for photophobia.        Photophobia mildly improved    Respiratory: Negative.     Cardiovascular: Negative.    Gastrointestinal: Negative.    Musculoskeletal:  Positive for neck pain and neck stiffness.   Neurological:  Positive for headaches. Negative for dizziness, seizures, facial asymmetry, speech difficulty, weakness and light-headedness.       Objective:     Vitals:  Temp: 97.4 °F (36.3 °C)  Pulse: 75  Rhythm: normal sinus rhythm  BP: (!) 141/69  MAP (mmHg): 97  Resp: 16  SpO2: 97 %    Temp  Min: 97.4 °F (36.3 °C)  Max: 98.9 °F (37.2 °C)  Pulse  Min: 74  Max: 99  BP  Min: 120/58  Max: 192/86  MAP (mmHg)  Min: 84  Max: 128  Resp  Min: 13  Max: 28  SpO2  Min: 92 %  Max: 100 %    01/17 0701 - 01/18 0700  In: 3387.7 [P.O.:2405]  Out: 3150 [Urine:3150]   Unmeasured Output  Urine Occurrence: 1  Stool Occurrence: 0  Pad Count: 3        Physical Exam  General Appearance: Not in acute hemodynamic distress  Mental Status Exam: awake, alert, aware, oriented, no aphasia, no dysarthria  Cranial Nerves: VFF, EOM full, pupils are equal and reactive to light bilaterally, symmetric facial sensory, symmetric facial motor, hearing grossly normal, midline tongue  Motor: no drift in the UE/LE. Power is 5/5 in both UE/LE. Normal tone.  Sensory: Symmetric to LT in all 4 limbs without extinction  Coordination: FTN  without dysmetria, fine motor regular and fast  Vascular: S1/S2 of normal intensity, no S3/S4 appreciated, no murmurs appreciated  Lungs: CTA bilaterally without wheezing  Abdomen: Soft, non-distended, non-tender, BS +       Medications:  Continuous Scheduledenoxparin, 40 mg, Q24H (prophylaxis, 1700)  fludrocortisone, 50 mcg, BID  [START ON 1/19/2024] fludrocortisone, 100 mcg, Daily  levothyroxine, 25 mcg, Before breakfast  LIDOcaine, 1 patch, Q24H  methocarbamoL, 750 mg, QID  niMODipine, 60 mg, Q4H  polyethylene glycol, 17 g, BID  pregabalin, 225 mg, BID  senna-docusate 8.6-50 mg, 2 tablet, BID  sodium chloride, 2,000 mg, TID    PRNacetaminophen, 1,000 mg, Q8H PRN  bisacodyL, 10 mg, Daily PRN  hydrALAZINE, 10 mg, Q4H PRN  labetalol, 10 mg, Q4H PRN  magnesium oxide, 800 mg, PRN  magnesium oxide, 800 mg, PRN  ondansetron, 4 mg, Q6H PRN  oxyCODONE, 5 mg, Q4H PRN  oxyCODONE, 10 mg, Q6H PRN  potassium bicarbonate, 35 mEq, PRN  potassium bicarbonate, 50 mEq, PRN  potassium bicarbonate, 60 mEq, PRN  potassium, sodium phosphates, 2 packet, PRN  potassium, sodium phosphates, 2 packet, PRN  potassium, sodium phosphates, 2 packet, PRN      Today I personally reviewed pertinent imaging, laboratory results, notably: TCDs wnl, no radiographic evidence of vasospasm. CBC w/ slight uptrend in leukocytosis to 15.86, Hb/platelets stable. CMP wnl, Na 137    Diet  Diet Adult Regular (IDDSI Level 7)  Diet Adult Regular (IDDSI Level 7)      Assessment/Plan:     Neuro  * Anterior communicating artery aneurysm  57 y/o presents to [facility] with thunderclap HA. Per [facility], reportedly no acute intracranial abnormality on CT head, though upon review at bedside, area of questionable hyperdensity with A Comm aneurysm on CTA. LP performed at [facility] with reported xanthochromia, though not documented on paper chart. Further LP results from [facility] as follows CSF color, red; ROSEMARY CSF, bloody; CSF volume, 14; WBC, 31; RBC 51,000; Protein 64.8; Glucose 73; Gram stain  without organisms seen.    S/p coiling on 1/11    -Admit to NCC  -NSGY, VN consulted  -Plan for repeat MRA today per NSGY recs -> awaiting clearance of cochlear implant, of note per pt has received multiple MRIs at [facility] w/o difficulty   -q1h neuro checks, vital checks  -EKG, ECHO, CXR reviewed  -Daily CBC, CMP, mag, phos  -SBP < 220 given aneurysm secured, prn labetalol and hydralazine  -SCDs, lovenox qday for DVT ppx  -Nimotop 60q4h x21 days  -Daily TCDs -> no evidence of vasospasm to date, d/c given pt PBD #14  -Lyrica 225 mg BID, methocarbamol 750 mg QID for HA management   -Strict I&Os, goal euvolemia  -Bolus PRN to goal   -PT/OT    Thunderclap headache  Pain control as needed  See primary problem     Psychiatric  Recurrent major depression in partial remission  Previously on [employer] per paper chart    - Outpt f/u   - Resume home psych meds upon discharge    Pulmonary  Mild intermittent asthma without complication  PRN duonebs    Cardiac/Vascular  Essential hypertension  Hx of    - Goal -220 mmHg during vasospasm period  - Hold home antihypertensives  - PRN labetalol, hydralazine    Renal/  Hyponatremia  Concern for mild CSW 2/2 SAH    - Monitor I&Os, goal euvolemia  - Improved overnight, space out checks to qday  - Goal eunatremia  - Decrease fludrocortisone 50 mcg BID -> 50 mcg qday x2 on 1/19 -> off  - C/w salt tabs 2 g TID, encourage PO salt intake     Endocrine  Acquired hypothyroidism  Known history of thyroid cyst/goiter    - C/w home synthroid          The patient is being Prophylaxed for:  Venous Thromboembolism with: Mechanical or Chemical  Stress Ulcer with: None  Ventilator Pneumonia with: not applicable    Activity Orders            Diet Adult Regular (IDDSI Level 7): Regular starting at 01/16 0913    Turn patient starting at 01/10 2200    Elevate HOB starting at 01/10 2156          Full Code    Likely transfer to floor s/p MRA brain    Level III visit    Trudy Storey,  MD  Neurocritical Care  [facility] - Neuro Critical Care

## 2024-01-18 NOTE — PLAN OF CARE
"[facility] Care Plan    POC reviewed with Loreto Goff and family at 0300. Pt verbalized understanding. Questions and concerns addressed. No acute events overnight. Pt progressing toward goals. Will continue to monitor. See below and flowsheets for full assessment and VS info.   -MKON     Is this a stroke patient? yes- Stroke booklet reviewed with patient, risk factors identified for patient and stroke booklet remains at bedside for ongoing education.     Neuro:  Fili Coma Scale  Best Eye Response: 4-->(E4) spontaneous  Best Motor Response: 6-->(M6) obeys commands  Best Verbal Response: 5-->(V5) oriented  Fili Coma Scale Score: 15  Assessment Qualifiers: patient not sedated/intubated, no eye obstruction present  Pupil PERRLA: yes     24hr Temp:  [98.5 °F (36.9 °C)-98.9 °F (37.2 °C)]     CV:   Rhythm: normal sinus rhythm  BP goals:   SBP < 220  MAP > 65    Resp:           Plan: N/A    GI/:     Diet/Nutrition Received: regular  Last Bowel Movement: 01/13/24  Voiding Characteristics: voids spontaneously without difficulty    Intake/Output Summary (Last 24 hours) at 1/18/2024 0438  Last data filed at 1/18/2024 0301  Gross per 24 hour   Intake 3829.45 ml   Output 3750 ml   Net 79.45 ml     Unmeasured Output  Urine Occurrence: 1  Stool Occurrence: 0  Pad Count: 3    Labs/Accuchecks:  Recent Labs   Lab 01/18/24  0007   WBC 15.86*   RBC 3.91*   HGB 11.5*   HCT 35.6*         Recent Labs   Lab 01/18/24  0007      K 4.1   CO2 21*      BUN 15   CREATININE 0.6   ALKPHOS 102   ALT 31   AST 14   BILITOT 0.2      Recent Labs   Lab 01/11/24  0552   INR 1.0   APTT 24.0    No results for input(s): "CPK", "CPKMB", "TROPONINI", "MB" in the last 168 hours.    Electrolytes: N/A - electrolytes WDL  Accuchecks: none    Gtts:      LDA/Wounds:  Lines/Drains/Airways       Peripheral Intravenous Line  Duration                  Peripheral IV - Single Lumen 01/16/24 0101 20 G Anterior;Left Forearm 2 days         Peripheral " IV - Single Lumen 01/16/24 2200 22 G Left;Posterior Hand 1 day                  Wounds: No  Wound care consulted: No      Problem: Adult Inpatient Plan of Care  Goal: Plan of Care Review  Flowsheets (Taken 1/18/2024 0437)  Plan of Care Reviewed With: patient     Problem: Adjustment to Illness (Stroke, Hemorrhagic)  Goal: Optimal Coping  Intervention: Support Psychosocial Response to Stroke  Flowsheets (Taken 1/18/2024 0437)  Supportive Measures:   active listening utilized   positive reinforcement provided   relaxation techniques promoted   verbalization of feelings encouraged  Family/Support System Care: support provided     Problem: Cerebral Tissue Perfusion (Stroke, Hemorrhagic)  Goal: Optimal Cerebral Tissue Perfusion  Intervention: Protect and Optimize Cerebral Perfusion  Flowsheets (Taken 1/18/2024 0437)  Sensory Stimulation Regulation:   care clustered   lighting decreased   quiet environment promoted   visual stimulation minimized  Cerebral Perfusion Promotion: blood pressure monitored  Stabilization Measures: airway opened

## 2024-01-18 NOTE — PLAN OF CARE
"[facility] Care Plan    POC reviewed with Loreto Goff and family at 1400. Pt verbalized understanding. Questions and concerns addressed. No acute events today. Pt progressing toward goals. Will continue to monitor. See below and flowsheets for full assessment and VS info.     -prn's given for headache  -assisted pt with mobility/positioning for comfort throughout shift (bed/chair)          Is this a stroke patient? no    Neuro:  Fili Coma Scale  Best Eye Response: 4-->(E4) spontaneous  Best Motor Response: 6-->(M6) obeys commands  Best Verbal Response: 5-->(V5) oriented  Fili Coma Scale Score: 15  Assessment Qualifiers: patient not sedated/intubated, no eye obstruction present  Pupil PERRLA: yes     24 hr Temp:  [98.6 °F (37 °C)-99 °F (37.2 °C)]     CV:   Rhythm: normal sinus rhythm  BP goals:   SBP < 220  MAP > 65    Resp:           Plan: N/A    GI/:     Diet/Nutrition Received: regular  Last Bowel Movement: 01/13/24  Voiding Characteristics: voids spontaneously without difficulty    Intake/Output Summary (Last 24 hours) at 1/17/2024 1838  Last data filed at 1/17/2024 1646  Gross per 24 hour   Intake 3096.76 ml   Output 3750 ml   Net -653.24 ml     Unmeasured Output  Urine Occurrence: 1  Stool Occurrence: 0  Pad Count: 3    Labs/Accuchecks:  Recent Labs   Lab 01/17/24  0231   WBC 14.15*   RBC 3.96*   HGB 11.5*   HCT 35.1*         Recent Labs   Lab 01/17/24  0231 01/17/24  0907    139   K 4.1  --    CO2 22*  --      --    BUN 10  --    CREATININE 0.6  --    ALKPHOS 71  --    ALT 36  --    AST 16  --    BILITOT 0.3  --       Recent Labs   Lab 01/11/24  0552   INR 1.0   APTT 24.0    No results for input(s): "CPK", "CPKMB", "TROPONINI", "MB" in the last 168 hours.    Electrolytes: N/A - electrolytes WDL  Accuchecks: ACHS    Gtts:      LDA/Wounds:  Lines/Drains/Airways       Peripheral Intravenous Line  Duration                  Peripheral IV - Single Lumen 01/16/24 0101 20 G Anterior;Left Forearm " 1 day         Peripheral IV - Single Lumen 01/16/24 2200 22 G Left;Posterior Hand <1 day                  Wounds:        Wound care consulted: No
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
## 2024-01-18 NOTE — ASSESSMENT & PLAN NOTE
57 y/o presents to [facility] with thunderclap HA. Per OHS, reportedly no acute intracranial abnormality on CT head, though upon review at bedside, area of questionable hyperdensity with A Comm aneurysm on CTA. LP performed at [facility] with reported xanthochromia, though not documented on paper chart. Further LP results from [facility] as follows CSF color, red; ROSEMARY CSF, bloody; CSF volume, 14; WBC, 31; RBC 51,000; Protein 64.8; Glucose 73; Gram stain without organisms seen.    S/p coiling on 1/11    -Admit to NCC  -NSGY, VN consulted  -Plan for repeat MRA today per NSGY recs -> awaiting clearance of cochlear implant, of note per pt has received multiple MRIs at [facility] w/o difficulty   -q1h neuro checks, vital checks  -EKG, ECHO, CXR reviewed  -Daily CBC, CMP, mag, phos  -SBP < 220 given aneurysm secured, prn labetalol and hydralazine  -SCDs, lovenox qday for DVT ppx  -Nimotop 60q4h x21 days  -Daily TCDs -> no evidence of vasospasm to date, d/c given pt PBD #14  -Lyrica 225 mg BID, methocarbamol 750 mg QID for HA management   -Strict I&Os, goal euvolemia  -Bolus PRN to goal   -PT/OT

## 2024-01-18 NOTE — ASSESSMENT & PLAN NOTE
Concern for mild CSW 2/2 SAH    - Monitor I&Os, goal euvolemia  - Improved overnight, space out checks to qday  - Goal eunatremia  - Decrease fludrocortisone 50 mcg BID -> 50 mcg qday x2 on 1/19 -> off  - C/w salt tabs 2 g TID, encourage PO salt intake

## 2024-01-18 NOTE — SUBJECTIVE & OBJECTIVE
Interval History: Please see hospital course above for full details    Review of Systems   Constitutional:  Positive for activity change and fatigue. Negative for appetite change and fever.   Eyes:  Positive for photophobia.        Photophobia mildly improved    Respiratory: Negative.     Cardiovascular: Negative.    Gastrointestinal: Negative.    Musculoskeletal:  Positive for neck pain and neck stiffness.   Neurological:  Positive for headaches. Negative for dizziness, seizures, facial asymmetry, speech difficulty, weakness and light-headedness.       Objective:     Vitals:  Temp: 97.4 °F (36.3 °C)  Pulse: 75  Rhythm: normal sinus rhythm  BP: (!) 141/69  MAP (mmHg): 97  Resp: 16  SpO2: 97 %    Temp  Min: 97.4 °F (36.3 °C)  Max: 98.9 °F (37.2 °C)  Pulse  Min: 74  Max: 99  BP  Min: 120/58  Max: 192/86  MAP (mmHg)  Min: 84  Max: 128  Resp  Min: 13  Max: 28  SpO2  Min: 92 %  Max: 100 %    01/17 0701 - 01/18 0700  In: 3387.7 [P.O.:2405]  Out: 3150 [Urine:3150]   Unmeasured Output  Urine Occurrence: 1  Stool Occurrence: 0  Pad Count: 3        Physical Exam  General Appearance: Not in acute hemodynamic distress  Mental Status Exam: awake, alert, aware, oriented, no aphasia, no dysarthria  Cranial Nerves: VFF, EOM full, pupils are equal and reactive to light bilaterally, symmetric facial sensory, symmetric facial motor, hearing grossly normal, midline tongue  Motor: no drift in the UE/LE. Power is 5/5 in both UE/LE. Normal tone.  Sensory: Symmetric to LT in all 4 limbs without extinction  Coordination: FTN without dysmetria, fine motor regular and fast  Vascular: S1/S2 of normal intensity, no S3/S4 appreciated, no murmurs appreciated  Lungs: CTA bilaterally without wheezing  Abdomen: Soft, non-distended, non-tender, BS +       Medications:  Continuous Scheduledenoxparin, 40 mg, Q24H (prophylaxis, 1700)  fludrocortisone, 50 mcg, BID  [START ON 1/19/2024] fludrocortisone, 100 mcg, Daily  levothyroxine, 25 mcg, Before  breakfast  LIDOcaine, 1 patch, Q24H  methocarbamoL, 750 mg, QID  niMODipine, 60 mg, Q4H  polyethylene glycol, 17 g, BID  pregabalin, 225 mg, BID  senna-docusate 8.6-50 mg, 2 tablet, BID  sodium chloride, 2,000 mg, TID    PRNacetaminophen, 1,000 mg, Q8H PRN  bisacodyL, 10 mg, Daily PRN  hydrALAZINE, 10 mg, Q4H PRN  labetalol, 10 mg, Q4H PRN  magnesium oxide, 800 mg, PRN  magnesium oxide, 800 mg, PRN  ondansetron, 4 mg, Q6H PRN  oxyCODONE, 5 mg, Q4H PRN  oxyCODONE, 10 mg, Q6H PRN  potassium bicarbonate, 35 mEq, PRN  potassium bicarbonate, 50 mEq, PRN  potassium bicarbonate, 60 mEq, PRN  potassium, sodium phosphates, 2 packet, PRN  potassium, sodium phosphates, 2 packet, PRN  potassium, sodium phosphates, 2 packet, PRN      Today I personally reviewed pertinent imaging, laboratory results, notably: TCDs wnl, no radiographic evidence of vasospasm. CBC w/ slight uptrend in leukocytosis to 15.86, Hb/platelets stable. CMP wnl, Na 137    Diet  Diet Adult Regular (IDDSI Level 7)  Diet Adult Regular (IDDSI Level 7)

## 2024-01-19 LAB
ALBUMIN SERPL BCP-MCNC: 3.2 G/DL (ref 3.5–5.2)
ALP SERPL-CCNC: 72 U/L (ref 55–135)
ALT SERPL W/O P-5'-P-CCNC: 26 U/L (ref 10–44)
ANION GAP SERPL CALC-SCNC: 11 MMOL/L (ref 8–16)
AST SERPL-CCNC: 13 U/L (ref 10–40)
BASOPHILS # BLD AUTO: 0.06 K/UL (ref 0–0.2)
BASOPHILS NFR BLD: 0.4 % (ref 0–1.9)
BILIRUB SERPL-MCNC: 0.2 MG/DL (ref 0.1–1)
BUN SERPL-MCNC: 17 MG/DL (ref 6–20)
CALCIUM SERPL-MCNC: 10.2 MG/DL (ref 8.7–10.5)
CHLORIDE SERPL-SCNC: 102 MMOL/L (ref 95–110)
CO2 SERPL-SCNC: 25 MMOL/L (ref 23–29)
CREAT SERPL-MCNC: 0.8 MG/DL (ref 0.5–1.4)
DIFFERENTIAL METHOD BLD: ABNORMAL
EOSINOPHIL # BLD AUTO: 0.4 K/UL (ref 0–0.5)
EOSINOPHIL NFR BLD: 3 % (ref 0–8)
ERYTHROCYTE [DISTWIDTH] IN BLOOD BY AUTOMATED COUNT: 13.2 % (ref 11.5–14.5)
EST. GFR  (NO RACE VARIABLE): >60 ML/MIN/1.73 M^2
GLUCOSE SERPL-MCNC: 168 MG/DL (ref 70–110)
HCT VFR BLD AUTO: 35.6 % (ref 37–48.5)
HGB BLD-MCNC: 11.4 G/DL (ref 12–16)
IMM GRANULOCYTES # BLD AUTO: 0.05 K/UL (ref 0–0.04)
IMM GRANULOCYTES NFR BLD AUTO: 0.4 % (ref 0–0.5)
LYMPHOCYTES # BLD AUTO: 2.6 K/UL (ref 1–4.8)
LYMPHOCYTES NFR BLD: 19.1 % (ref 18–48)
MCH RBC QN AUTO: 28.9 PG (ref 27–31)
MCHC RBC AUTO-ENTMCNC: 32 G/DL (ref 32–36)
MCV RBC AUTO: 90 FL (ref 82–98)
MONOCYTES # BLD AUTO: 1.4 K/UL (ref 0.3–1)
MONOCYTES NFR BLD: 10.6 % (ref 4–15)
NEUTROPHILS # BLD AUTO: 9 K/UL (ref 1.8–7.7)
NEUTROPHILS NFR BLD: 66.5 % (ref 38–73)
NRBC BLD-RTO: 0 /100 WBC
PHOSPHATE SERPL-MCNC: 4.4 MG/DL (ref 2.7–4.5)
PLATELET # BLD AUTO: 374 K/UL (ref 150–450)
PMV BLD AUTO: 10 FL (ref 9.2–12.9)
POCT GLUCOSE: 118 MG/DL (ref 70–110)
POCT GLUCOSE: 147 MG/DL (ref 70–110)
POTASSIUM SERPL-SCNC: 4.3 MMOL/L (ref 3.5–5.1)
PROT SERPL-MCNC: 7.2 G/DL (ref 6–8.4)
RBC # BLD AUTO: 3.94 M/UL (ref 4–5.4)
SODIUM SERPL-SCNC: 138 MMOL/L (ref 136–145)
WBC # BLD AUTO: 13.54 K/UL (ref 3.9–12.7)

## 2024-01-19 PROCEDURE — 99233 SBSQ HOSP IP/OBS HIGH 50: CPT | Mod: ,,, | Performed by: PSYCHIATRY & NEUROLOGY

## 2024-01-19 PROCEDURE — 25000003 PHARM REV CODE 250

## 2024-01-19 PROCEDURE — 63600175 PHARM REV CODE 636 W HCPCS: Performed by: PSYCHIATRY & NEUROLOGY

## 2024-01-19 PROCEDURE — 97535 SELF CARE MNGMENT TRAINING: CPT

## 2024-01-19 PROCEDURE — 25000003 PHARM REV CODE 250: Performed by: NURSE PRACTITIONER

## 2024-01-19 PROCEDURE — 80053 COMPREHEN METABOLIC PANEL: CPT

## 2024-01-19 PROCEDURE — 94761 N-INVAS EAR/PLS OXIMETRY MLT: CPT

## 2024-01-19 PROCEDURE — 25500020 PHARM REV CODE 255: Performed by: PSYCHIATRY & NEUROLOGY

## 2024-01-19 PROCEDURE — 84100 ASSAY OF PHOSPHORUS: CPT

## 2024-01-19 PROCEDURE — 25000003 PHARM REV CODE 250: Performed by: PSYCHIATRY & NEUROLOGY

## 2024-01-19 PROCEDURE — 63600175 PHARM REV CODE 636 W HCPCS

## 2024-01-19 PROCEDURE — 85025 COMPLETE CBC W/AUTO DIFF WBC: CPT

## 2024-01-19 PROCEDURE — 97116 GAIT TRAINING THERAPY: CPT

## 2024-01-19 PROCEDURE — 20600001 HC STEP DOWN PRIVATE ROOM

## 2024-01-19 PROCEDURE — A9585 GADOBUTROL INJECTION: HCPCS | Performed by: PSYCHIATRY & NEUROLOGY

## 2024-01-19 RX ORDER — GADOBUTROL 604.72 MG/ML
10 INJECTION INTRAVENOUS
Status: COMPLETED | OUTPATIENT
Start: 2024-01-19 | End: 2024-01-19

## 2024-01-19 RX ORDER — LABETALOL HCL 20 MG/4 ML
10 SYRINGE (ML) INTRAVENOUS EVERY 4 HOURS PRN
Status: DISCONTINUED | OUTPATIENT
Start: 2024-01-19 | End: 2024-01-24 | Stop reason: HOSPADM

## 2024-01-19 RX ORDER — HYDRALAZINE HYDROCHLORIDE 20 MG/ML
10 INJECTION INTRAMUSCULAR; INTRAVENOUS EVERY 4 HOURS PRN
Status: DISCONTINUED | OUTPATIENT
Start: 2024-01-19 | End: 2024-01-24 | Stop reason: HOSPADM

## 2024-01-19 RX ADMIN — METHOCARBAMOL TABLETS 750 MG: 750 TABLET, COATED ORAL at 04:01

## 2024-01-19 RX ADMIN — METHOCARBAMOL TABLETS 750 MG: 750 TABLET, COATED ORAL at 09:01

## 2024-01-19 RX ADMIN — OXYCODONE HYDROCHLORIDE 10 MG: 10 TABLET ORAL at 04:01

## 2024-01-19 RX ADMIN — SODIUM CHLORIDE 2000 MG: 1 TABLET ORAL at 03:01

## 2024-01-19 RX ADMIN — OXYCODONE HYDROCHLORIDE 5 MG: 5 TABLET ORAL at 05:01

## 2024-01-19 RX ADMIN — NIMODIPINE 60 MG: 30 CAPSULE, LIQUID FILLED ORAL at 10:01

## 2024-01-19 RX ADMIN — NIMODIPINE 60 MG: 30 CAPSULE, LIQUID FILLED ORAL at 01:01

## 2024-01-19 RX ADMIN — POLYETHYLENE GLYCOL 3350 17 G: 17 POWDER, FOR SOLUTION ORAL at 08:01

## 2024-01-19 RX ADMIN — METHOCARBAMOL TABLETS 750 MG: 750 TABLET, COATED ORAL at 12:01

## 2024-01-19 RX ADMIN — NIMODIPINE 60 MG: 30 CAPSULE, LIQUID FILLED ORAL at 09:01

## 2024-01-19 RX ADMIN — SODIUM CHLORIDE 2000 MG: 1 TABLET ORAL at 09:01

## 2024-01-19 RX ADMIN — METHOCARBAMOL TABLETS 750 MG: 750 TABLET, COATED ORAL at 08:01

## 2024-01-19 RX ADMIN — ENOXAPARIN SODIUM 40 MG: 100 INJECTION SUBCUTANEOUS at 04:01

## 2024-01-19 RX ADMIN — ONDANSETRON 4 MG: 2 INJECTION INTRAMUSCULAR; INTRAVENOUS at 04:01

## 2024-01-19 RX ADMIN — ONDANSETRON 4 MG: 2 INJECTION INTRAMUSCULAR; INTRAVENOUS at 11:01

## 2024-01-19 RX ADMIN — OXYCODONE HYDROCHLORIDE 10 MG: 10 TABLET ORAL at 07:01

## 2024-01-19 RX ADMIN — GADOBUTROL 10 ML: 604.72 INJECTION INTRAVENOUS at 04:01

## 2024-01-19 RX ADMIN — OXYCODONE HYDROCHLORIDE 5 MG: 5 TABLET ORAL at 11:01

## 2024-01-19 RX ADMIN — PREGABALIN 225 MG: 150 CAPSULE ORAL at 08:01

## 2024-01-19 RX ADMIN — NIMODIPINE 60 MG: 30 CAPSULE, LIQUID FILLED ORAL at 05:01

## 2024-01-19 RX ADMIN — FLUDROCORTISONE ACETATE 100 MCG: 0.1 TABLET ORAL at 08:01

## 2024-01-19 RX ADMIN — LEVOTHYROXINE SODIUM 25 MCG: 25 TABLET ORAL at 06:01

## 2024-01-19 RX ADMIN — SODIUM CHLORIDE 2000 MG: 1 TABLET ORAL at 08:01

## 2024-01-19 RX ADMIN — NIMODIPINE 60 MG: 30 CAPSULE, LIQUID FILLED ORAL at 03:01

## 2024-01-19 RX ADMIN — NIMODIPINE 60 MG: 30 CAPSULE, LIQUID FILLED ORAL at 06:01

## 2024-01-19 RX ADMIN — PREGABALIN 225 MG: 150 CAPSULE ORAL at 09:01

## 2024-01-19 NOTE — ASSESSMENT & PLAN NOTE
59 y/o presents to [facility] with thunderclap HA. Per OHS, reportedly no acute intracranial abnormality on CT head, though upon review at bedside, area of questionable hyperdensity with A Comm aneurysm on CTA. LP performed at [facility] with reported xanthochromia, though not documented on paper chart. Further LP results from [facility] as follows CSF color, red; ROSEMARY CSF, bloody; CSF volume, 14; WBC, 31; RBC 51,000; Protein 64.8; Glucose 73; Gram stain without organisms seen.    S/p coiling on 1/11    -NSGY, VN consulted  -Repeat MRA w/o evidence of residual filling of Acomm aneurysm. 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

## 2024-01-19 NOTE — PT/OT/SLP PROGRESS
Occupational Therapy   Treatment    Name: Loreto Goff  MRN: [id number]  Admitting Diagnosis:  Anterior communicating artery aneurysm       Recommendations:     Discharge Recommendations: low Intensity Therapy  Discharge Equipment Recommendations:  to be determined by next level of care  Barriers to discharge:  None    Assessment:     Loreto Goff is a 58 y.o. female with a medical diagnosis of Anterior communicating artery aneurysm.  She presents with decrease functional status secondary to medical diagnosis. Performance deficits affecting function are impaired balance, impaired endurance. Patient performed all mobility and self care tasks with standby- CGA; including dressing, bathing, and toileting. Patient has met all goals for OT services and will therefore be DC from acute OT services.     Rehab Prognosis:  Good; patient would benefit from acute skilled OT services to address these deficits and reach maximum level of function.       Plan:     Patient to be seen 4 x/week to address the above listed problems via self-care/home management, therapeutic activities, therapeutic exercises, neuromuscular re-education  Plan of Care Expires: 02/13/24  Plan of Care Reviewed with: patient    Subjective     Chief Complaint: Patient wanting to shower   Patient/Family Comments/goals: Increase functional status   Pain/Comfort:  Pain Rating 1: 0/10    Objective:     Communicated with: RN prior to session.  Patient found up with nursing upon OT entry to room.    General Precautions: Standard, fall    Orthopedic Precautions:N/A  Braces: N/A  Respiratory Status: Room air     Occupational Performance:      Functional Mobility/Transfers:  Patient completed Sit <> Stand Transfer with stand by assistance  with  no assistive device   Patient completed Toilet Transfer Step Transfer technique with stand by assistance with  no AD  Patient completed  Shower Transfer Step Transfer technique with contact guard assistance with no  AD  Functional Mobility: Patient able to ambulate throughout room to perform various self care tasks with standby-cga assist; no LOB noted at his time.    Activities of Daily Living:  Grooming: stand by assistance to perform grooming tasks sitting EOB  Bathing: stand by assistance sitting on shower chair to complete task   Upper Body Dressing: stand by assistance to don/doff hospital gown   Toileting: stand by assistance using toilet     AMPAC 6 Click ADL: 18    Treatment & Education:  Provided education regarding role of OT, POC, & discharge recommendations.  Pt with no further questions/concerns at this time. OT provided education on home recommendations and fall prevention in preparation for D/C.     Patient left sitting edge of bed with all lines intact, call button in reach, and RN present    GOALS:   Multidisciplinary Problems       Occupational Therapy Goals          Problem: Occupational Therapy    Goal Priority Disciplines Outcome Interventions   Occupational Therapy Goal     OT, PT/OT Ongoing, Progressing    Description: Goals to be met by: 2/13/24     Patient will increase functional independence with ADLs by performing:    UE Dressing with Supervision.  LE Dressing with Contact Guard Assistance.  Grooming while seated with Stand-by Assistance.  Supine to sit with Minimal Assistance.                         Time Tracking:     OT Date of Treatment: 01/19/24  OT Start Time: 1415  OT Stop Time: 1515  OT Total Time (min): 60 min    Billable Minutes:Self Care/Home Management 60 minutes     OT/GENNARO: OT          1/19/2024

## 2024-01-19 NOTE — PT/OT/SLP PROGRESS
Physical Therapy Treatment    Patient Name:  Loreto Goff   MRN:  [id number]    Recommendations:     Discharge Recommendations: Low Intensity Therapy  Discharge Equipment Recommendations: none  Barriers to discharge: None    Assessment:     Loreto Goff is a 58 y.o. female admitted with a medical diagnosis of Anterior communicating artery aneurysm.  She presents with the following impairments/functional limitations: impaired functional mobility, pain Pt. cooperative and tolerated treatment well despite c/o HA and light sensitivity. Pt. with significant progress with mobility.    Rehab Prognosis: Good; patient would benefit from acute skilled PT services to address these deficits and reach maximum level of function.    Recent Surgery: * No surgery found *      Plan:     During this hospitalization, patient to be seen 4 x/week to address the identified rehab impairments via gait training, therapeutic activities, therapeutic exercises and progress toward the following goals:    Plan of Care Expires:  02/13/24    Subjective     Chief Complaint: HA and light sensitivity  Patient/Family Comments/goals: to go home  Pain/Comfort:  Pain Rating 1:  (pt. did not rate)  Location 1: head  Pain Addressed 1: Reposition, Distraction      Objective:     Communicated with nursing prior to session.  Patient found supine with peripheral IV, pulse ox (continuous), telemetry upon PT entry to room.     General Precautions: Standard, fall  Orthopedic Precautions: N/A  Braces: N/A  Respiratory Status: Room air     Functional Mobility:  Bed Mobility:     Rolling Left:  supervision  Scooting: supervision  Supine to Sit: supervision  Transfers:     Sit to Stand:  supervision with no AD  Gait: >300' with SBA without AD or LOB. Pt. had standing rest breaks x2 during gait trial  Balance: fair+      AM-PAC 6 CLICK MOBILITY  Turning over in bed (including adjusting bedclothes, sheets and blankets)?: 4  Sitting down on and standing up from a chair  with arms (e.g., wheelchair, bedside commode, etc.): 4  Moving from lying on back to sitting on the side of the bed?: 4  Moving to and from a bed to a chair (including a wheelchair)?: 4  Need to walk in hospital room?: 3  Climbing 3-5 steps with a railing?: 3  Basic Mobility Total Score: 22       Treatment & Education:  Discussed pt.'s progress, goals, safety with mobility, and POC.    Patient left sitting edge of bed with all lines intact and call button in reach..    GOALS:   Multidisciplinary Problems       Physical Therapy Goals          Problem: Physical Therapy    Goal Priority Disciplines Outcome Goal Variances Interventions   Physical Therapy Goal     PT, PT/OT Ongoing, Progressing     Description: Goals to be met by: 24     Patient will increase functional independence with mobility by performin. Supine to sit with Modified [place]  2. Sit to supine with Modified [place]  3. Sit to stand transfer with Modified [place]  4. Bed to chair transfer with Stand-by Assistance using LRAD as needed  5. Gait  x 150 feet with Stand-by Assistance using LRAD as needed.   6. Ascend/descend 2 stair with no Handrails and Stand-by Assistance  7. Lower extremity exercise program x15 reps per handout, with assistance as needed                         Time Tracking:     PT Received On: 24  PT Start Time: 1023     PT Stop Time: 1051  PT Total Time (min): 28 min     Billable Minutes: Gait Training 28    Treatment Type: Treatment  PT/PTA: PT     Number of PTA visits since last PT visit: 0     2024

## 2024-01-19 NOTE — SUBJECTIVE & OBJECTIVE
Interval History:  Please see hospital course above for full details     Review of Systems   Constitutional:  Positive for activity change, appetite change and fatigue.   Eyes:  Positive for photophobia. Negative for visual disturbance.   Respiratory: Negative.     Cardiovascular: Negative.    Gastrointestinal:  Positive for constipation and nausea. Negative for vomiting.   Musculoskeletal:  Positive for neck pain and neck stiffness.   Neurological:  Positive for numbness and headaches. Negative for dizziness, seizures, facial asymmetry, speech difficulty, weakness and light-headedness.        Unchanged L 2nd-4th toe numbness   Psychiatric/Behavioral:  Positive for decreased concentration, dysphoric mood and sleep disturbance.      Objective:     Vitals:  Temp: 98.7 °F (37.1 °C)  Pulse: 84  Rhythm: normal sinus rhythm  BP: 136/71  MAP (mmHg): 97  Resp: (!) 28  SpO2: (!) 88 %    Temp  Min: 98.3 °F (36.8 °C)  Max: 99.4 °F (37.4 °C)  Pulse  Min: 79  Max: 109  BP  Min: 127/60  Max: 189/88  MAP (mmHg)  Min: 87  Max: 127  Resp  Min: 15  Max: 35  SpO2  Min: 88 %  Max: 100 %    01/18 0701 - 01/19 0700  In: 3130 [P.O.:3130]  Out: 3150 [Urine:3150]   Unmeasured Output  Urine Occurrence: 1  Stool Occurrence: 1  Pad Count: 3        Physical Exam  General Appearance: Not in acute hemodynamic distress  Mental Status Exam: awake, alert, aware, oriented, no aphasia, no dysarthria  Cranial Nerves: VFF, EOM full, pupils are equal and reactive to light bilaterally, symmetric facial sensory, symmetric facial motor, hearing grossly normal, midline tongue  Motor: no drift in the UE/LE. Power is 5/5 in both UE/LE. Normal tone.  Sensory: Symmetric to LT in all 4 limbs without extinction  Coordination: FTN without dysmetria, fine motor regular and fast  Vascular: S1/S2 of normal intensity, no S3/S4 appreciated, no murmurs appreciated  Lungs: CTA bilaterally without wheezing  Abdomen: Soft, non-distended, non-tender, BS +        Medications:  Continuous Scheduledenoxparin, 40 mg, Q24H (prophylaxis, 1700)  [START ON 1/20/2024] fludrocortisone, 50 mcg, Daily  levothyroxine, 25 mcg, Before breakfast  LIDOcaine, 1 patch, Q24H  methocarbamoL, 750 mg, QID  niMODipine, 60 mg, Q4H  polyethylene glycol, 17 g, BID  pregabalin, 225 mg, BID  senna-docusate 8.6-50 mg, 2 tablet, BID  sodium chloride, 2,000 mg, TID    PRNacetaminophen, 1,000 mg, Q8H PRN  bisacodyL, 10 mg, Daily PRN  hydrALAZINE, 10 mg, Q4H PRN  labetalol, 10 mg, Q4H PRN  magnesium oxide, 800 mg, PRN  magnesium oxide, 800 mg, PRN  ondansetron, 4 mg, Q6H PRN  oxyCODONE, 5 mg, Q4H PRN  oxyCODONE, 10 mg, Q6H PRN  potassium bicarbonate, 35 mEq, PRN  potassium bicarbonate, 50 mEq, PRN  potassium bicarbonate, 60 mEq, PRN  potassium, sodium phosphates, 2 packet, PRN  potassium, sodium phosphates, 2 packet, PRN  potassium, sodium phosphates, 2 packet, PRN      Today I personally reviewed pertinent imaging, laboratory results, notably: MRA w/o evidence of residual filling of treated Acomm aneurysm. CBC w/ downtrending leukocytosis to 13.54, Hb/platelets stable. CMP w/ stable Na at 138, otherwise unremarkable    Diet  Diet Adult Regular (IDDSI Level 7)  Diet Adult Regular (IDDSI Level 7)

## 2024-01-19 NOTE — SUBJECTIVE & OBJECTIVE
Interval History: 1/19 naeon, exam stable, MRA w contrast obtained overnight. Will review imaging with staff. Continue course and SAH protocol today    Medications:  Continuous Infusions:  Scheduled Meds:   enoxparin  40 mg Subcutaneous Q24H (prophylaxis, 1700)    fludrocortisone  100 mcg Oral Daily    levothyroxine  25 mcg Oral Before breakfast    LIDOcaine  1 patch Transdermal Q24H    methocarbamoL  750 mg Oral QID    niMODipine  60 mg Oral Q4H    polyethylene glycol  17 g Oral BID    pregabalin  225 mg Oral BID    senna-docusate 8.6-50 mg  2 tablet Oral BID    sodium chloride  2,000 mg Oral TID     PRN Meds:acetaminophen, bisacodyL, hydrALAZINE, labetalol, magnesium oxide, magnesium oxide, ondansetron, oxyCODONE, oxyCODONE, potassium bicarbonate, potassium bicarbonate, potassium bicarbonate, potassium, sodium phosphates, potassium, sodium phosphates, potassium, sodium phosphates     Review of Systems  Objective:     Weight: 97.1 kg (214 lb)  Body mass index is 33.52 kg/m².  Vital Signs (Most Recent):  Temp: 98.5 °F (36.9 °C) (01/19/24 0701)  Pulse: 102 (01/19/24 0701)  Resp: (!) 23 (01/19/24 0701)  BP: (!) 140/73 (01/19/24 0701)  SpO2: (!) 94 % (01/19/24 0701) Vital Signs (24h Range):  Temp:  [97.4 °F (36.3 °C)-99.4 °F (37.4 °C)] 98.5 °F (36.9 °C)  Pulse:  [] 102  Resp:  [15-28] 23  SpO2:  [92 %-100 %] 94 %  BP: (120-189)/() 140/73     Date 01/19/24 0700 - 01/20/24 0659   Shift 6359-1568 9532-4009 6898-2827 24 Hour Total   INTAKE   P.O. 220   220   Shift Total(mL/kg) 220(2.3)   220(2.3)   OUTPUT   Urine(mL/kg/hr) 450   450   Shift Total(mL/kg) 450(4.6)   450(4.6)   Weight (kg) 97.1 97.1 97.1 97.1                               Physical Exam         Neurosurgery Physical Exam  General: well developed, well nourished, no distress.   HEENT: normocephalic, atraumatic  CV: regular rate   Pulmonary: normal respirations, no signs of respiratory distress  Abdomen: soft, non-distended, not tender to  "palpation  Skin: Skin is warm, dry and intact  Heme: no bruising    Neuro:   Mental Status: AO x4, no aphasia, no dysarthria   CN: PERRL, EOMI, VF intact to confrontation, sensation intact bilaterally, eyebrow raise and grimace symmetric, tongue midline  Motor: moves all extremities spontaneously, 4/5 throughout, no pronator drift   Sensory: intact to light touch throughout  Cerebellar: finger to nose intact bilaterally  Reflexes: -Rodriguez's, -Babinski, no clonus. Patellar: 2+ bilaterally   Significant Labs:  Recent Labs   Lab 01/17/24  0907 01/18/24  0007 01/19/24  0208   GLU  --  115* 168*    137 138   K  --  4.1 4.3   CL  --  105 102   CO2  --  21* 25   BUN  --  15 17   CREATININE  --  0.6 0.8   CALCIUM  --  9.8 10.2   MG  --  1.8  --        Recent Labs   Lab 01/18/24  0007 01/19/24  0208   WBC 15.86* 13.54*   HGB 11.5* 11.4*   HCT 35.6* 35.6*    374       No results for input(s): "LABPT", "INR", "APTT" in the last 48 hours.  Microbiology Results (last 7 days)       ** No results found for the last 168 hours. **          All pertinent labs from the last 24 hours have been reviewed.    Significant Diagnostics:  I have reviewed all pertinent imaging results/findings within the past 24 hours.  "

## 2024-01-19 NOTE — PLAN OF CARE
"[facility] Care Plan    POC reviewed with Loreto Goff at 0300. Pt verbalized understanding. Questions and concerns addressed. No acute events overnight. Pt progressing toward goals. Will continue to monitor. See below and flowsheets for full assessment and VS info.     -MRA facilitated  -oxycodone 10 mg tab x1 for HA  -ondansetron 4 mg iv given for nausea    Is this a stroke patient? yes- Stroke booklet reviewed with patient and family, risk factors identified for patient and stroke booklet remains at bedside for ongoing education.     Neuro:  Fili Coma Scale  Best Eye Response: 4-->(E4) spontaneous  Best Motor Response: 6-->(M6) obeys commands  Best Verbal Response: 5-->(V5) oriented  Bradford Coma Scale Score: 15  Assessment Qualifiers: patient not sedated/intubated  Pupil PERRLA: yes     24hr Temp:  [97.4 °F (36.3 °C)-99.4 °F (37.4 °C)]     CV:   Rhythm: normal sinus rhythm  BP goals:   SBP < 220  MAP > 65    Resp:           Plan: N/A    GI/:     Diet/Nutrition Received: regular  Last Bowel Movement: 01/13/24  Voiding Characteristics: voids spontaneously without difficulty    Intake/Output Summary (Last 24 hours) at 1/19/2024 0629  Last data filed at 1/19/2024 0156  Gross per 24 hour   Intake 3130 ml   Output 3150 ml   Net -20 ml     Unmeasured Output  Urine Occurrence: 1  Stool Occurrence: 1  Pad Count: 3    Labs/Accuchecks:  Recent Labs   Lab 01/19/24  0208   WBC 13.54*   RBC 3.94*   HGB 11.4*   HCT 35.6*         Recent Labs   Lab 01/19/24  0208      K 4.3   CO2 25      BUN 17   CREATININE 0.8   ALKPHOS 72   ALT 26   AST 13   BILITOT 0.2    No results for input(s): "PROTIME", "INR", "APTT", "HEPANTIXA" in the last 168 hours. No results for input(s): "CPK", "CPKMB", "TROPONINI", "MB" in the last 168 hours.    Electrolytes: N/A - electrolytes WDL  Accuchecks: ACHS    Gtts:      LDA/Wounds:  Lines/Drains/Airways       Peripheral Intravenous Line  Duration                  Peripheral IV - Single " Lumen 01/16/24 0101 20 G Anterior;Left Forearm 3 days         Peripheral IV - Single Lumen 01/18/24 20 G Anterior;Distal;Right Upper Arm 1 day                  Wounds: No  Wound care consulted: No

## 2024-01-19 NOTE — PROGRESS NOTES
[facility]  Neurosurgery  Progress Note    Subjective:     History of Present Illness: 59 yo F with PMH of hypothyroidism who presents as transfer from [facility] due to thunderclap headache that started suddenly on Friday, 6 days ago. She reports her headache has worsened more and more over the last week so she came to the hospital. She denies any illicit drug use or blood thinners. She reports her headache is a 10 out of 10. 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 Per report there was xanthochromia on LP performed at OSH on 11/10/24. NSGY consulted for aneurysm.     Post-Op Info:  * No surgery found *       Interval History: 1/19 naeon, exam stable, MRA w contrast obtained overnight. Will review imaging with staff. Continue course and SAH protocol today    Medications:  Continuous Infusions:  Scheduled Meds:   enoxparin  40 mg Subcutaneous Q24H (prophylaxis, 1700)    fludrocortisone  100 mcg Oral Daily    levothyroxine  25 mcg Oral Before breakfast    LIDOcaine  1 patch Transdermal Q24H    methocarbamoL  750 mg Oral QID    niMODipine  60 mg Oral Q4H    polyethylene glycol  17 g Oral BID    pregabalin  225 mg Oral BID    senna-docusate 8.6-50 mg  2 tablet Oral BID    sodium chloride  2,000 mg Oral TID     PRN Meds:acetaminophen, bisacodyL, hydrALAZINE, labetalol, magnesium oxide, magnesium oxide, ondansetron, oxyCODONE, oxyCODONE, potassium bicarbonate, potassium bicarbonate, potassium bicarbonate, potassium, sodium phosphates, potassium, sodium phosphates, potassium, sodium phosphates     Review of Systems  Objective:     Weight: 97.1 kg (214 lb)  Body mass index is 33.52 kg/m².  Vital Signs (Most Recent):  Temp: 98.5 °F (36.9 °C) (01/19/24 0701)  Pulse: 102 (01/19/24 0701)  Resp: (!) 23 (01/19/24 0701)  BP: (!) 140/73 (01/19/24 0701)  SpO2: (!) 94 % (01/19/24 0701) Vital Signs (24h Range):  Temp:  [97.4 °F (36.3 °C)-99.4 °F (37.4 °C)] 98.5  "°F (36.9 °C)  Pulse:  [] 102  Resp:  [15-28] 23  SpO2:  [92 %-100 %] 94 %  BP: (120-189)/() 140/73     Date 01/19/24 0700 - 01/20/24 0659   Shift 0153-5186 9526-7813 7766-6726 24 Hour Total   INTAKE   P.O. 220   220   Shift Total(mL/kg) 220(2.3)   220(2.3)   OUTPUT   Urine(mL/kg/hr) 450   450   Shift Total(mL/kg) 450(4.6)   450(4.6)   Weight (kg) 97.1 97.1 97.1 97.1                              Physical Exam         Neurosurgery Physical Exam  General: well developed, well nourished, no distress.   HEENT: normocephalic, atraumatic  CV: regular rate   Pulmonary: normal respirations, no signs of respiratory distress  Abdomen: soft, non-distended, not tender to palpation  Skin: Skin is warm, dry and intact  Heme: no bruising    Neuro:   Mental Status: AO x4, no aphasia, no dysarthria   CN: PERRL, EOMI, VF intact to confrontation, sensation intact bilaterally, eyebrow raise and grimace symmetric, tongue midline  Motor: moves all extremities spontaneously, 4/5 throughout, no pronator drift   Sensory: intact to light touch throughout  Cerebellar: finger to nose intact bilaterally  Reflexes: -Rodriguez's, -Babinski, no clonus. Patellar: 2+ bilaterally   Significant Labs:  Recent Labs   Lab 01/17/24  0907 01/18/24  0007 01/19/24  0208   GLU  --  115* 168*    137 138   K  --  4.1 4.3   CL  --  105 102   CO2  --  21* 25   BUN  --  15 17   CREATININE  --  0.6 0.8   CALCIUM  --  9.8 10.2   MG  --  1.8  --        Recent Labs   Lab 01/18/24  0007 01/19/24  0208   WBC 15.86* 13.54*   HGB 11.5* 11.4*   HCT 35.6* 35.6*    374       No results for input(s): "LABPT", "INR", "APTT" in the last 48 hours.  Microbiology Results (last 7 days)       ** No results found for the last 168 hours. **          All pertinent labs from the last 24 hours have been reviewed.    Significant Diagnostics:  I have reviewed all pertinent imaging results/findings within the past 24 hours.  Assessment/Plan:     * Anterior " communicating artery aneurysm  59 yo F with PMH of hypothyroidism who presents as transfer from [facility] due to thunderclap headache that started suddenly on Friday, 6 days ago. She reports her headache has worsened more and more over the last week so she came to the hospital. She denies any illicit drug use or blood thinners. She reports her headache is a 10 out of 10.     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   Per report there was xanthochromia on LP performed at OSH on 11/10/24.     Presumably post bleed day 8  Coil embolization of Acomm 1/11    - admitted to [facility], q1h neuro checks  - SAH protocol // vasospasm watch   - keppra for seizure ppx   - Na > 135   - nimotop x21 days   - TCDs x14 days   - SBP < 180   - on room air, ctm  - strict I/O's; keep euvolemic to 1L net positive  - minimize narcotics for HA control  - hold any blood thinners  - rest of care per NCC; nsgy will continue to follow  -MRA w contrast completed 1/19. Will review with team for further recommendations.        Lb Mcintosh MD  Neurosurgery  [facility] - Neuro [facility]

## 2024-01-19 NOTE — ASSESSMENT & PLAN NOTE
Concern for mild CSW 2/2 SAH -> RESOLVED    - Monitor I&Os, goal euvolemia  - Improved overnight, space out checks to qday  - Goal eunatremia  - Decrease fludrocortisone 50 mcg qday x2 days -> off  - C/w salt tabs 2 g TID, encourage PO salt intake

## 2024-01-19 NOTE — PLAN OF CARE
"[facility] Care Plan    POC reviewed with Loreto Goff and family at 1400. Pt verbalized understanding. Questions and concerns addressed. No acute events today. Pt progressing toward goals. Will continue to monitor. See below and flowsheets for full assessment and VS info.             Is this a stroke patient? yes- Stroke booklet reviewed with patient, risk factors identified for patient and stroke booklet remains at bedside for ongoing education.     Neuro:  Irma Coma Scale  Best Eye Response: 4-->(E4) spontaneous  Best Motor Response: 6-->(M6) obeys commands  Best Verbal Response: 5-->(V5) oriented  Fili Coma Scale Score: 15  Assessment Qualifiers: patient not sedated/intubated  Pupil PERRLA: yes     24 hr Temp:  [98.5 °F (36.9 °C)-99.4 °F (37.4 °C)]     CV:   Rhythm: normal sinus rhythm  BP goals:   SBP < 220  MAP > 65    Resp:           Plan: N/A    GI/:     Diet/Nutrition Received: regular  Last Bowel Movement: 01/19/24  Voiding Characteristics: voids spontaneously without difficulty    Intake/Output Summary (Last 24 hours) at 1/19/2024 1548  Last data filed at 1/19/2024 1532  Gross per 24 hour   Intake 3060 ml   Output 3450 ml   Net -390 ml     Unmeasured Output  Urine Occurrence: 1  Stool Occurrence: 1  Pad Count: 3    Labs/Accuchecks:  Recent Labs   Lab 01/19/24  0208   WBC 13.54*   RBC 3.94*   HGB 11.4*   HCT 35.6*         Recent Labs   Lab 01/19/24  0208      K 4.3   CO2 25      BUN 17   CREATININE 0.8   ALKPHOS 72   ALT 26   AST 13   BILITOT 0.2    No results for input(s): "PROTIME", "INR", "APTT", "HEPANTIXA" in the last 168 hours. No results for input(s): "CPK", "CPKMB", "TROPONINI", "MB" in the last 168 hours.    Electrolytes: N/A - electrolytes WDL  Accuchecks: none    Gtts:      LDA/Wounds:  Lines/Drains/Airways       Peripheral Intravenous Line  Duration                  Peripheral IV - Single Lumen 01/16/24 0101 20 G Anterior;Left Forearm 3 days         Peripheral IV - Single " Lumen 01/18/24 20 G Anterior;Distal;Right Upper Arm 1 day                  Wounds:        Wound care consulted: No

## 2024-01-19 NOTE — PROGRESS NOTES
[facility] - Neuro Critical Care  Neurocritical Care  Progress Note    Admit Date: 1/10/2024  Service Date: 01/19/2024  Length of Stay: 9    Subjective:     Chief Complaint: Anterior communicating artery aneurysm    History of Present Illness: Ms. Loreto Goff is a 59 y/o F with a PMHx of depression and hypothyroidism who presents to [facility] with thunderclap HA. She initially presented to [facility] on 01/10, but states that her symtoms consisting of neck pain radiating the front of her head, photophobia, and R leg numbness started on 01/05.  No acute intracranial abnormality on CT reported from [facility], though questionable area of hyperdensity upon review of disc at [facility].  A Comm aneurysm on CTA. LP performed at [facility] with reported xanthochromia. Further LP results from [facility] as follows CSF color, red; ROSEMARY CSF, bloody; CSF volume, 14; WBC, 31; RBC 51,000; Protein 64.8; Glucose 73; Gram stain without organisms seen. She will be admitted to [facility] for hourly neuro-monitoring and a higher level of care.       Hospital Course: 01/11: persistent headache, with sats in low 90s, use decadron rather then dilaudid, awaiting angio on cardene for sbp control   01/12: has bilateral 6th nerve palsy today which is more apparent, head CT unchanged, was difficult to examine yesterday due to presence of narcotics and headache  01/13/2024 Started on silodosin overnight for urinary retention requiring I&O cath. Worsening HA w transient L sided numbness overnight. CTH stable. TCDs pending. Persistent 10/10 HA. Gabapentin changed to lyrica 150 BID. 500NS bolus given for euvolemia.   01/14/2024 Improved HA overnight to 7/10, 10/10 again this AM, Increased lyrica to 225BID. Remains net -3L this AM despite aggressive fluid replacement overnight of 3L total, high UOP. Given 1L NS and started on fludrocortisone 100BID. Will reassess volume status at noon for further replacement. TCDs pending.  01/15/2024 Na briefly low at 132, uptrending to 137 on  recheck. C/o persistent HA and photophobia. D/c'd keppra given pt aneurysm secured.   01/16/2024 Na stable overnight, space out checks to q12hrs. TCDs wnl. Methocarbamol increased to 750 mg QID given persistent HA  01/17/2024 NAEON, TCDs stable, serum Na stable. D/c'd IV morphine, transitioned to oral pain regimen   01/18/2024 PBD 14. TCDs pending. MRA w vessel wall imaging pending clearance of cochlear implant.   01/19/2024 MRA w/ no evidence of residual filling of coiled Acomm aneurysm, small unruptured PICA aneurysm noted, outpt f/u. Serum Na stable, weaning fludrocortisone. Stable for transfer to floor     Interval History:  Please see hospital course above for full details     Review of Systems   Constitutional:  Positive for activity change, appetite change and fatigue.   Eyes:  Positive for photophobia. Negative for visual disturbance.   Respiratory: Negative.     Cardiovascular: Negative.    Gastrointestinal:  Positive for constipation and nausea. Negative for vomiting.   Musculoskeletal:  Positive for neck pain and neck stiffness.   Neurological:  Positive for numbness and headaches. Negative for dizziness, seizures, facial asymmetry, speech difficulty, weakness and light-headedness.        Unchanged L 2nd-4th toe numbness   Psychiatric/Behavioral:  Positive for decreased concentration, dysphoric mood and sleep disturbance.      Objective:     Vitals:  Temp: 98.7 °F (37.1 °C)  Pulse: 84  Rhythm: normal sinus rhythm  BP: 136/71  MAP (mmHg): 97  Resp: (!) 28  SpO2: (!) 88 %    Temp  Min: 98.3 °F (36.8 °C)  Max: 99.4 °F (37.4 °C)  Pulse  Min: 79  Max: 109  BP  Min: 127/60  Max: 189/88  MAP (mmHg)  Min: 87  Max: 127  Resp  Min: 15  Max: 35  SpO2  Min: 88 %  Max: 100 %    01/18 0701 - 01/19 0700  In: 3130 [P.O.:3130]  Out: 3150 [Urine:3150]   Unmeasured Output  Urine Occurrence: 1  Stool Occurrence: 1  Pad Count: 3        Physical Exam  General Appearance: Not in acute hemodynamic distress  Mental Status Exam:  awake, alert, aware, oriented, no aphasia, no dysarthria  Cranial Nerves: VFF, EOM full, pupils are equal and reactive to light bilaterally, symmetric facial sensory, symmetric facial motor, hearing grossly normal, midline tongue  Motor: no drift in the UE/LE. Power is 5/5 in both UE/LE. Normal tone.  Sensory: Symmetric to LT in all 4 limbs without extinction  Coordination: FTN without dysmetria, fine motor regular and fast  Vascular: S1/S2 of normal intensity, no S3/S4 appreciated, no murmurs appreciated  Lungs: CTA bilaterally without wheezing  Abdomen: Soft, non-distended, non-tender, BS +       Medications:  Continuous Scheduledenoxparin, 40 mg, Q24H (prophylaxis, 1700)  [START ON 1/20/2024] fludrocortisone, 50 mcg, Daily  levothyroxine, 25 mcg, Before breakfast  LIDOcaine, 1 patch, Q24H  methocarbamoL, 750 mg, QID  niMODipine, 60 mg, Q4H  polyethylene glycol, 17 g, BID  pregabalin, 225 mg, BID  senna-docusate 8.6-50 mg, 2 tablet, BID  sodium chloride, 2,000 mg, TID    PRNacetaminophen, 1,000 mg, Q8H PRN  bisacodyL, 10 mg, Daily PRN  hydrALAZINE, 10 mg, Q4H PRN  labetalol, 10 mg, Q4H PRN  magnesium oxide, 800 mg, PRN  magnesium oxide, 800 mg, PRN  ondansetron, 4 mg, Q6H PRN  oxyCODONE, 5 mg, Q4H PRN  oxyCODONE, 10 mg, Q6H PRN  potassium bicarbonate, 35 mEq, PRN  potassium bicarbonate, 50 mEq, PRN  potassium bicarbonate, 60 mEq, PRN  potassium, sodium phosphates, 2 packet, PRN  potassium, sodium phosphates, 2 packet, PRN  potassium, sodium phosphates, 2 packet, PRN      Today I personally reviewed pertinent imaging, laboratory results, notably: MRA w/o evidence of residual filling of treated Acomm aneurysm. CBC w/ downtrending leukocytosis to 13.54, Hb/platelets stable. CMP w/ stable Na at 138, otherwise unremarkable    Diet  Diet Adult Regular (IDDSI Level 7)  Diet Adult Regular (IDDSI Level 7)      Assessment/Plan:     Neuro  * Anterior communicating artery aneurysm  59 y/o presents to [facility] with thunderclap HA.  Per [facility], reportedly no acute intracranial abnormality on CT head, though upon review at bedside, area of questionable hyperdensity with A Comm aneurysm on CTA. LP performed at [facility] with reported xanthochromia, though not documented on paper chart. Further LP results from [facility] as follows CSF color, red; ROSEMARY CSF, bloody; CSF volume, 14; WBC, 31; RBC 51,000; Protein 64.8; Glucose 73; Gram stain without organisms seen.    S/p coiling on 1/11    -NSGY, VN consulted  -Repeat MRA w/o evidence of residual filling of Acomm aneurysm. Incidental PICA aneurysm, unruptured -> outpt f/u   -q4h neuro checks, vital checks  -EKG, ECHO, CXR reviewed  -Daily CBC, CMP, mag, phos  -SBP < 220 given aneurysm secured, prn labetalol and hydralazine  -SCDs, lovenox qday for DVT ppx  -Nimotop 60q4h x21 days  -Daily TCDs -> no evidence of vasospasm to date, d/c given pt PBD #14  -Lyrica 225 mg BID, methocarbamol 750 mg QID for HA management   -Strict I&Os, goal euvolemia  -Bolus PRN to goal   -PT/OT    Thunderclap headache  Pain control as needed  See primary problem     Psychiatric  Recurrent major depression in partial remission  Previously on Trintellix per paper chart    - Outpt f/u   - Resume home psych meds upon discharge    Pulmonary  Mild intermittent asthma without complication  PRN duonebs    Cardiac/Vascular  Essential hypertension  Hx of    - Goal -220 mmHg during vasospasm period  - Not on home antihypertensives per patient  - Start lowering SBP goal given pt completed vasospasm watch period, goal SBP   - Start PO antihypertensives if persistently above goal  - PRN labetalol, hydralazine    Renal/  Hyponatremia  Concern for mild CSW 2/2 SAH -> RESOLVED    - Monitor I&Os, goal euvolemia  - Improved overnight, space out checks to qday  - Goal eunatremia  - Decrease fludrocortisone 50 mcg qday x2 days -> off  - C/w salt tabs 2 g TID, encourage PO salt intake     Endocrine  Acquired hypothyroidism  Known history of  thyroid cyst/goiter    - C/w home synthroid          The patient is being Prophylaxed for:  Venous Thromboembolism with: Mechanical or Chemical  Stress Ulcer with: None  Ventilator Pneumonia with: not applicable    Activity Orders            Diet Adult Regular (IDDSI Level 7): Regular starting at 01/16 0913    Turn patient starting at 01/10 2200    Elevate HOB starting at 01/10 2156          Full Code    Stable for transfer to floor    Level III visit    Trudy Storey MD  Neurocritical Care  [facility] - Neuro Critical Care

## 2024-01-19 NOTE — ASSESSMENT & PLAN NOTE
59 yo F with PMH of hypothyroidism who presents as transfer from [facility] due to thunderclap headache that started suddenly on Friday, 6 days ago. She reports her headache has worsened more and more over the last week so she came to the hospital. She denies any illicit drug use or blood thinners. She reports her headache is a 10 out of 10.     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   Per report there was xanthochromia on LP performed at OSH on 11/10/24.     Presumably post bleed day 8  Coil embolization of Acomm 1/11    - admitted to [facility], q1h neuro checks  - SAH protocol // vasospasm watch   - keppra for seizure ppx   - Na > 135   - nimotop x21 days   - TCDs x14 days   - SBP < 180   - on room air, ctm  - strict I/O's; keep euvolemic to 1L net positive  - minimize narcotics for HA control  - hold any blood thinners  - rest of care per NCC; nsgy will continue to follow  -MRA w contrast completed 1/19. Will review with team for further recommendations.

## 2024-01-19 NOTE — PLAN OF CARE
Problem: Occupational Therapy  Goal: Occupational Therapy Goal  Description: Goals to be met by: 2/13/24     Patient will increase functional independence with ADLs by performing:    UE Dressing with Supervision.  LE Dressing with Contact Guard Assistance.  Grooming while seated with Stand-by Assistance.  Supine to sit with Minimal Assistance.    Outcome: Ongoing, Progressing     Patient has met all goals at this time and will be DC from OT services. DC recommendation has been updated to reflect patient current progress.

## 2024-01-19 NOTE — ASSESSMENT & PLAN NOTE
Hx of    - Goal -220 mmHg during vasospasm period  - Not on home antihypertensives per patient  - Start lowering SBP goal given pt completed vasospasm watch period, goal SBP   - Start PO antihypertensives if persistently above goal  - PRN labetalol, hydralazine

## 2024-01-19 NOTE — NURSING
Pt arrived back to room following MRA        Pt was escorted by RN on cardiac monitoring and ambu bag.         Patient placed back on bedside monitor with alarms audible, bed in low position with bed alarm on, call light within reach. [id number].

## 2024-01-19 NOTE — NURSING
2115- called MRI dept. for MRA facilitation. MRI tech to verify pt's cochlear implant and to check with radiologist. And will call back. PA informed.    2nd attempt for facilitation of pt's MRA. Per MRI tech they'll call back once available.

## 2024-01-20 LAB
ALBUMIN SERPL BCP-MCNC: 3.3 G/DL (ref 3.5–5.2)
ALP SERPL-CCNC: 77 U/L (ref 55–135)
ALT SERPL W/O P-5'-P-CCNC: 31 U/L (ref 10–44)
ANION GAP SERPL CALC-SCNC: 9 MMOL/L (ref 8–16)
AST SERPL-CCNC: 21 U/L (ref 10–40)
BASOPHILS # BLD AUTO: 0.08 K/UL (ref 0–0.2)
BASOPHILS NFR BLD: 0.7 % (ref 0–1.9)
BILIRUB SERPL-MCNC: 0.2 MG/DL (ref 0.1–1)
BUN SERPL-MCNC: 12 MG/DL (ref 6–20)
CALCIUM SERPL-MCNC: 10 MG/DL (ref 8.7–10.5)
CHLORIDE SERPL-SCNC: 101 MMOL/L (ref 95–110)
CO2 SERPL-SCNC: 26 MMOL/L (ref 23–29)
CREAT SERPL-MCNC: 0.7 MG/DL (ref 0.5–1.4)
DIFFERENTIAL METHOD BLD: ABNORMAL
EOSINOPHIL # BLD AUTO: 0.3 K/UL (ref 0–0.5)
EOSINOPHIL NFR BLD: 2.7 % (ref 0–8)
ERYTHROCYTE [DISTWIDTH] IN BLOOD BY AUTOMATED COUNT: 13.4 % (ref 11.5–14.5)
EST. GFR  (NO RACE VARIABLE): >60 ML/MIN/1.73 M^2
GLUCOSE SERPL-MCNC: 103 MG/DL (ref 70–110)
HCT VFR BLD AUTO: 35.3 % (ref 37–48.5)
HGB BLD-MCNC: 11.2 G/DL (ref 12–16)
IMM GRANULOCYTES # BLD AUTO: 0.06 K/UL (ref 0–0.04)
IMM GRANULOCYTES NFR BLD AUTO: 0.5 % (ref 0–0.5)
LYMPHOCYTES # BLD AUTO: 2.5 K/UL (ref 1–4.8)
LYMPHOCYTES NFR BLD: 21.2 % (ref 18–48)
MCH RBC QN AUTO: 28.6 PG (ref 27–31)
MCHC RBC AUTO-ENTMCNC: 31.7 G/DL (ref 32–36)
MCV RBC AUTO: 90 FL (ref 82–98)
MONOCYTES # BLD AUTO: 1.4 K/UL (ref 0.3–1)
MONOCYTES NFR BLD: 11.8 % (ref 4–15)
NEUTROPHILS # BLD AUTO: 7.4 K/UL (ref 1.8–7.7)
NEUTROPHILS NFR BLD: 63.1 % (ref 38–73)
NRBC BLD-RTO: 0 /100 WBC
PHOSPHATE SERPL-MCNC: 3.6 MG/DL (ref 2.7–4.5)
PLATELET # BLD AUTO: 396 K/UL (ref 150–450)
PMV BLD AUTO: 10.5 FL (ref 9.2–12.9)
POTASSIUM SERPL-SCNC: 3.9 MMOL/L (ref 3.5–5.1)
PROT SERPL-MCNC: 7.5 G/DL (ref 6–8.4)
RBC # BLD AUTO: 3.92 M/UL (ref 4–5.4)
SODIUM SERPL-SCNC: 136 MMOL/L (ref 136–145)
WBC # BLD AUTO: 11.72 K/UL (ref 3.9–12.7)

## 2024-01-20 PROCEDURE — 11000001 HC ACUTE MED/SURG PRIVATE ROOM

## 2024-01-20 PROCEDURE — 25000003 PHARM REV CODE 250

## 2024-01-20 PROCEDURE — 63600175 PHARM REV CODE 636 W HCPCS

## 2024-01-20 PROCEDURE — 84100 ASSAY OF PHOSPHORUS: CPT

## 2024-01-20 PROCEDURE — 25000003 PHARM REV CODE 250: Performed by: PSYCHIATRY & NEUROLOGY

## 2024-01-20 PROCEDURE — 20600001 HC STEP DOWN PRIVATE ROOM

## 2024-01-20 PROCEDURE — 63600175 PHARM REV CODE 636 W HCPCS: Performed by: PSYCHIATRY & NEUROLOGY

## 2024-01-20 PROCEDURE — 94761 N-INVAS EAR/PLS OXIMETRY MLT: CPT

## 2024-01-20 PROCEDURE — 25000003 PHARM REV CODE 250: Performed by: NURSE PRACTITIONER

## 2024-01-20 PROCEDURE — 85025 COMPLETE CBC W/AUTO DIFF WBC: CPT

## 2024-01-20 PROCEDURE — 80053 COMPREHEN METABOLIC PANEL: CPT

## 2024-01-20 RX ADMIN — METHOCARBAMOL TABLETS 750 MG: 750 TABLET, COATED ORAL at 08:01

## 2024-01-20 RX ADMIN — PREGABALIN 225 MG: 150 CAPSULE ORAL at 09:01

## 2024-01-20 RX ADMIN — FLUDROCORTISONE ACETATE 50 MCG: 0.1 TABLET ORAL at 09:01

## 2024-01-20 RX ADMIN — SODIUM CHLORIDE 2000 MG: 1 TABLET ORAL at 09:01

## 2024-01-20 RX ADMIN — NIMODIPINE 60 MG: 30 CAPSULE, LIQUID FILLED ORAL at 04:01

## 2024-01-20 RX ADMIN — SENNOSIDES AND DOCUSATE SODIUM 2 TABLET: 50; 8.6 TABLET ORAL at 09:01

## 2024-01-20 RX ADMIN — OXYCODONE HYDROCHLORIDE 10 MG: 10 TABLET ORAL at 01:01

## 2024-01-20 RX ADMIN — SODIUM CHLORIDE 2000 MG: 1 TABLET ORAL at 02:01

## 2024-01-20 RX ADMIN — OXYCODONE HYDROCHLORIDE 5 MG: 5 TABLET ORAL at 05:01

## 2024-01-20 RX ADMIN — ACETAMINOPHEN 1000 MG: 500 TABLET ORAL at 04:01

## 2024-01-20 RX ADMIN — OXYCODONE HYDROCHLORIDE 10 MG: 10 TABLET ORAL at 08:01

## 2024-01-20 RX ADMIN — SENNOSIDES AND DOCUSATE SODIUM 2 TABLET: 50; 8.6 TABLET ORAL at 08:01

## 2024-01-20 RX ADMIN — ONDANSETRON 4 MG: 2 INJECTION INTRAMUSCULAR; INTRAVENOUS at 08:01

## 2024-01-20 RX ADMIN — METHOCARBAMOL TABLETS 750 MG: 750 TABLET, COATED ORAL at 02:01

## 2024-01-20 RX ADMIN — NIMODIPINE 60 MG: 30 CAPSULE, LIQUID FILLED ORAL at 01:01

## 2024-01-20 RX ADMIN — NIMODIPINE 60 MG: 30 CAPSULE, LIQUID FILLED ORAL at 05:01

## 2024-01-20 RX ADMIN — METHOCARBAMOL TABLETS 750 MG: 750 TABLET, COATED ORAL at 04:01

## 2024-01-20 RX ADMIN — ENOXAPARIN SODIUM 40 MG: 100 INJECTION SUBCUTANEOUS at 04:01

## 2024-01-20 RX ADMIN — SODIUM CHLORIDE 2000 MG: 1 TABLET ORAL at 08:01

## 2024-01-20 RX ADMIN — POLYETHYLENE GLYCOL 3350 17 G: 17 POWDER, FOR SOLUTION ORAL at 09:01

## 2024-01-20 RX ADMIN — NIMODIPINE 60 MG: 30 CAPSULE, LIQUID FILLED ORAL at 09:01

## 2024-01-20 RX ADMIN — LEVOTHYROXINE SODIUM 25 MCG: 25 TABLET ORAL at 05:01

## 2024-01-20 RX ADMIN — PREGABALIN 225 MG: 150 CAPSULE ORAL at 08:01

## 2024-01-20 RX ADMIN — METHOCARBAMOL TABLETS 750 MG: 750 TABLET, COATED ORAL at 09:01

## 2024-01-20 NOTE — PLAN OF CARE
"[facility] Care Plan    POC reviewed with Loreto Goff  at 0300. Pt verbalized understanding. Questions and concerns addressed. No acute events overnight. Pt progressing toward goals. Will continue to monitor. See below and flowsheets for full assessment and VS info.     -oxycodone 10 mg tab given x1 for HA  -Pt. Transferred to  950      Is this a stroke patient? yes- Stroke booklet reviewed with patient and family, risk factors identified for patient and stroke booklet remains at bedside for ongoing education.     Neuro:  Fili Coma Scale  Best Eye Response: 4-->(E4) spontaneous  Best Motor Response: 6-->(M6) obeys commands  Best Verbal Response: 5-->(V5) oriented  Reva Coma Scale Score: 15  Assessment Qualifiers: patient not sedated/intubated  Pupil PERRLA: yes     24hr Temp:  [97.9 °F (36.6 °C)-98.9 °F (37.2 °C)]     CV:   Rhythm: normal sinus rhythm  BP goals:   SBP < 180  MAP > 65    Resp:           Plan: N/A    GI/:     Diet/Nutrition Received: regular  Last Bowel Movement: 01/19/24  Voiding Characteristics: voids spontaneously without difficulty    Intake/Output Summary (Last 24 hours) at 1/20/2024 0525  Last data filed at 1/20/2024 0404  Gross per 24 hour   Intake 2590 ml   Output 3600 ml   Net -1010 ml     Unmeasured Output  Urine Occurrence: 1  Stool Occurrence: 1  Pad Count: 3    Labs/Accuchecks:  Recent Labs   Lab 01/20/24  0207   WBC 11.72   RBC 3.92*   HGB 11.2*   HCT 35.3*         Recent Labs   Lab 01/20/24  0207      K 3.9   CO2 26      BUN 12   CREATININE 0.7   ALKPHOS 77   ALT 31   AST 21   BILITOT 0.2    No results for input(s): "PROTIME", "INR", "APTT", "HEPANTIXA" in the last 168 hours. No results for input(s): "CPK", "CPKMB", "TROPONINI", "MB" in the last 168 hours.    Electrolytes: N/A - electrolytes WDL  Accuchecks:none    Gtts:      LDA/Wounds:  Lines/Drains/Airways       Peripheral Intravenous Line  Duration                  Peripheral IV - Single Lumen 01/16/24 0101 " 20 G Anterior;Left Forearm 4 days         Peripheral IV - Single Lumen 01/18/24 20 G Anterior;Distal;Right Upper Arm 2 days                  Wounds: No  Wound care consulted: No

## 2024-01-20 NOTE — SUBJECTIVE & OBJECTIVE
Interval History: NAEON. Pt doing well this morning    Medications:  Continuous Infusions:  Scheduled Meds:   enoxparin  40 mg Subcutaneous Q24H (prophylaxis, 1700)    fludrocortisone  50 mcg Oral Daily    levothyroxine  25 mcg Oral Before breakfast    LIDOcaine  1 patch Transdermal Q24H    methocarbamoL  750 mg Oral QID    niMODipine  60 mg Oral Q4H    polyethylene glycol  17 g Oral BID    pregabalin  225 mg Oral BID    senna-docusate 8.6-50 mg  2 tablet Oral BID    sodium chloride  2,000 mg Oral TID     PRN Meds:acetaminophen, bisacodyL, hydrALAZINE, labetalol, magnesium oxide, magnesium oxide, ondansetron, oxyCODONE, oxyCODONE, potassium bicarbonate, potassium bicarbonate, potassium bicarbonate, potassium, sodium phosphates, potassium, sodium phosphates, potassium, sodium phosphates     Review of Systems  Objective:     Weight: 97.1 kg (214 lb)  Body mass index is 33.52 kg/m².  Vital Signs (Most Recent):  Temp: 98.7 °F (37.1 °C) (01/20/24 0749)  Pulse: 72 (01/20/24 0749)  Resp: 18 (01/20/24 0749)  BP: (!) 129/56 (01/20/24 0749)  SpO2: 97 % (01/20/24 0749) Vital Signs (24h Range):  Temp:  [97.9 °F (36.6 °C)-99.2 °F (37.3 °C)] 98.7 °F (37.1 °C)  Pulse:  [72-95] 72  Resp:  [11-36] 18  SpO2:  [88 %-100 %] 97 %  BP: (129-178)/(56-91) 129/56                                 Physical Exam         Neurosurgery Physical Exam  Mental Status: AO x4, no aphasia, no dysarthria   CN: PERRL, EOMI, VF intact to confrontation, sensation intact bilaterally, eyebrow raise and grimace symmetric, tongue midline  Motor: moves all extremities spontaneously, 4/5 throughout, no pronator drift   Sensory: intact to light touch throughout  Cerebellar: finger to nose intact bilaterally  Reflexes: -Rodriguez's, -Babinski, no clonus. Patellar: 2+ bilaterally   Significant Labs:  Recent Labs   Lab 01/19/24 0208 01/20/24  0207   * 103    136   K 4.3 3.9    101   CO2 25 26   BUN 17 12   CREATININE 0.8 0.7   CALCIUM 10.2 10.0  "    Recent Labs   Lab 01/19/24  0208 01/20/24  0207   WBC 13.54* 11.72   HGB 11.4* 11.2*   HCT 35.6* 35.3*    396     No results for input(s): "LABPT", "INR", "APTT" in the last 48 hours.  Microbiology Results (last 7 days)       ** No results found for the last 168 hours. **          All pertinent labs from the last 24 hours have been reviewed.    Significant Diagnostics:  I have reviewed all pertinent imaging results/findings within the past 24 hours.  "

## 2024-01-20 NOTE — NURSING
Report given to JOHANA Simms. Pt. Transferred  via bed to [facility] on telemonitor, labeled pt belongings, pt's chart. Latest VS BP- 178/72 HR 88 RR 25 o2 sat 97%. Receiving RN at bedside, pt neurologically intact. PA updated of this transfer.

## 2024-01-20 NOTE — PLAN OF CARE
Problem: Adult Inpatient Plan of Care  Goal: Plan of Care Review  Outcome: Ongoing, Progressing  Goal: Optimal Comfort and Wellbeing  Outcome: Ongoing, Progressing     Problem: Pain (Stroke, Hemorrhagic)  Goal: Acceptable Pain Control  Outcome: Ongoing, Progressing

## 2024-01-20 NOTE — PROGRESS NOTES
Jani Grimaldo - Neurosurgery ([facility])  Neurosurgery  Progress Note    Subjective:     History of Present Illness: 59 yo F with PMH of hypothyroidism who presents as transfer from [place] due to thunderclap headache that started suddenly on Friday, 6 days ago. She reports her headache has worsened more and more over the last week so she came to the hospital. She denies any illicit drug use or blood thinners. She reports her headache is a 10 out of 10. 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 Per report there was xanthochromia on LP performed at OSH on 11/10/24. NSGY consulted for aneurysm.     Post-Op Info:  * No surgery found *       Interval History: NAEON. Pt doing well this morning    Medications:  Continuous Infusions:  Scheduled Meds:   enoxparin  40 mg Subcutaneous Q24H (prophylaxis, 1700)    fludrocortisone  50 mcg Oral Daily    levothyroxine  25 mcg Oral Before breakfast    LIDOcaine  1 patch Transdermal Q24H    methocarbamoL  750 mg Oral QID    niMODipine  60 mg Oral Q4H    polyethylene glycol  17 g Oral BID    pregabalin  225 mg Oral BID    senna-docusate 8.6-50 mg  2 tablet Oral BID    sodium chloride  2,000 mg Oral TID     PRN Meds:acetaminophen, bisacodyL, hydrALAZINE, labetalol, magnesium oxide, magnesium oxide, ondansetron, oxyCODONE, oxyCODONE, potassium bicarbonate, potassium bicarbonate, potassium bicarbonate, potassium, sodium phosphates, potassium, sodium phosphates, potassium, sodium phosphates     Review of Systems  Objective:     Weight: 97.1 kg (214 lb)  Body mass index is 33.52 kg/m².  Vital Signs (Most Recent):  Temp: 98.7 °F (37.1 °C) (01/20/24 0749)  Pulse: 72 (01/20/24 0749)  Resp: 18 (01/20/24 0749)  BP: (!) 129/56 (01/20/24 0749)  SpO2: 97 % (01/20/24 0749) Vital Signs (24h Range):  Temp:  [97.9 °F (36.6 °C)-99.2 °F (37.3 °C)] 98.7 °F (37.1 °C)  Pulse:  [72-95] 72  Resp:  [11-36] 18  SpO2:  [88 %-100 %] 97 %  BP: (129-178)/(56-91)  "129/56                                 Physical Exam         Neurosurgery Physical Exam  Mental Status: AO x4, no aphasia, no dysarthria   CN: PERRL, EOMI, VF intact to confrontation, sensation intact bilaterally, eyebrow raise and grimace symmetric, tongue midline  Motor: moves all extremities spontaneously, 4/5 throughout, no pronator drift   Sensory: intact to light touch throughout  Cerebellar: finger to nose intact bilaterally  Reflexes: -Rodriguez's, -Babinski, no clonus. Patellar: 2+ bilaterally   Significant Labs:  Recent Labs   Lab 01/19/24  0208 01/20/24  0207   * 103    136   K 4.3 3.9    101   CO2 25 26   BUN 17 12   CREATININE 0.8 0.7   CALCIUM 10.2 10.0     Recent Labs   Lab 01/19/24 0208 01/20/24 0207   WBC 13.54* 11.72   HGB 11.4* 11.2*   HCT 35.6* 35.3*    396     No results for input(s): "LABPT", "INR", "APTT" in the last 48 hours.  Microbiology Results (last 7 days)       ** No results found for the last 168 hours. **          All pertinent labs from the last 24 hours have been reviewed.    Significant Diagnostics:  I have reviewed all pertinent imaging results/findings within the past 24 hours.  Assessment/Plan:     * Anterior communicating artery aneurysm  57 yo F with PMH of hypothyroidism who presents as transfer from [employer] [place] due to thunderclap headache that started suddenly on Friday, 6 days ago. She reports her headache has worsened more and more over the last week so she came to the hospital. She denies any illicit drug use or blood thinners. She reports her headache is a 10 out of 10.     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   Per report there was xanthochromia on LP performed at OSH on 11/10/24.     Presumably post bleed day 8  Coil embolization of Acomm 1/11    - admitted to [facility], Floor   - SAH protocol // vasospasm watch   - keppra for seizure ppx   - Na > 135   - nimotop x21 days  - SBP < 180   - on " room air, ctm  - strict I/O's; keep euvolemic to 1L net positive  - minimize narcotics for HA control  - hold any blood thinners    Dispo: ongoing        Christiano Hernandez MD  Neurosurgery  Jani Grimaldo - Neurosurgery ([facility])

## 2024-01-20 NOTE — ASSESSMENT & PLAN NOTE
59 yo F with PMH of hypothyroidism who presents as transfer from [employer] due to thunderclap headache that started suddenly on Friday, 6 days ago. She reports her headache has worsened more and more over the last week so she came to the hospital. She denies any illicit drug use or blood thinners. She reports her headache is a 10 out of 10.     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   Per report there was xanthochromia on LP performed at OSH on 11/10/24.     Presumably post bleed day 8  Coil embolization of Acomm 1/11    - admitted to [facility], Floor   - SAH protocol // vasospasm watch   - keppra for seizure ppx   - Na > 135   - nimotop x21 days  - SBP < 180   - on room air, ctm  - strict I/O's; keep euvolemic to 1L net positive  - minimize narcotics for HA control  - hold any blood thinners    Dispo: ongoing

## 2024-01-21 LAB
ALBUMIN SERPL BCP-MCNC: 3 G/DL (ref 3.5–5.2)
ALP SERPL-CCNC: 76 U/L (ref 55–135)
ALT SERPL W/O P-5'-P-CCNC: 30 U/L (ref 10–44)
ANION GAP SERPL CALC-SCNC: 9 MMOL/L (ref 8–16)
AST SERPL-CCNC: 19 U/L (ref 10–40)
BASOPHILS # BLD AUTO: 0.03 K/UL (ref 0–0.2)
BASOPHILS NFR BLD: 0.4 % (ref 0–1.9)
BILIRUB SERPL-MCNC: 0.2 MG/DL (ref 0.1–1)
BUN SERPL-MCNC: 13 MG/DL (ref 6–20)
CALCIUM SERPL-MCNC: 9.7 MG/DL (ref 8.7–10.5)
CHLORIDE SERPL-SCNC: 101 MMOL/L (ref 95–110)
CO2 SERPL-SCNC: 27 MMOL/L (ref 23–29)
CREAT SERPL-MCNC: 0.7 MG/DL (ref 0.5–1.4)
DIFFERENTIAL METHOD BLD: ABNORMAL
EOSINOPHIL # BLD AUTO: 0.2 K/UL (ref 0–0.5)
EOSINOPHIL NFR BLD: 2.9 % (ref 0–8)
ERYTHROCYTE [DISTWIDTH] IN BLOOD BY AUTOMATED COUNT: 13.2 % (ref 11.5–14.5)
EST. GFR  (NO RACE VARIABLE): >60 ML/MIN/1.73 M^2
GLUCOSE SERPL-MCNC: 126 MG/DL (ref 70–110)
HCT VFR BLD AUTO: 32.2 % (ref 37–48.5)
HGB BLD-MCNC: 10.5 G/DL (ref 12–16)
IMM GRANULOCYTES # BLD AUTO: 0.03 K/UL (ref 0–0.04)
IMM GRANULOCYTES NFR BLD AUTO: 0.4 % (ref 0–0.5)
LYMPHOCYTES # BLD AUTO: 1.6 K/UL (ref 1–4.8)
LYMPHOCYTES NFR BLD: 19 % (ref 18–48)
MCH RBC QN AUTO: 28.8 PG (ref 27–31)
MCHC RBC AUTO-ENTMCNC: 32.6 G/DL (ref 32–36)
MCV RBC AUTO: 89 FL (ref 82–98)
MONOCYTES # BLD AUTO: 0.9 K/UL (ref 0.3–1)
MONOCYTES NFR BLD: 10.6 % (ref 4–15)
NEUTROPHILS # BLD AUTO: 5.6 K/UL (ref 1.8–7.7)
NEUTROPHILS NFR BLD: 66.7 % (ref 38–73)
NRBC BLD-RTO: 0 /100 WBC
PHOSPHATE SERPL-MCNC: 4.1 MG/DL (ref 2.7–4.5)
PLATELET # BLD AUTO: 375 K/UL (ref 150–450)
PMV BLD AUTO: 10.4 FL (ref 9.2–12.9)
POTASSIUM SERPL-SCNC: 3.9 MMOL/L (ref 3.5–5.1)
PROT SERPL-MCNC: 6.9 G/DL (ref 6–8.4)
RBC # BLD AUTO: 3.64 M/UL (ref 4–5.4)
SODIUM SERPL-SCNC: 137 MMOL/L (ref 136–145)
WBC # BLD AUTO: 8.32 K/UL (ref 3.9–12.7)

## 2024-01-21 PROCEDURE — 25000003 PHARM REV CODE 250

## 2024-01-21 PROCEDURE — 25000003 PHARM REV CODE 250: Performed by: NURSE PRACTITIONER

## 2024-01-21 PROCEDURE — 20600001 HC STEP DOWN PRIVATE ROOM

## 2024-01-21 PROCEDURE — 85025 COMPLETE CBC W/AUTO DIFF WBC: CPT

## 2024-01-21 PROCEDURE — 84100 ASSAY OF PHOSPHORUS: CPT

## 2024-01-21 PROCEDURE — 63600175 PHARM REV CODE 636 W HCPCS: Performed by: PSYCHIATRY & NEUROLOGY

## 2024-01-21 PROCEDURE — 36415 COLL VENOUS BLD VENIPUNCTURE: CPT

## 2024-01-21 PROCEDURE — 99233 SBSQ HOSP IP/OBS HIGH 50: CPT | Mod: ,,, | Performed by: NEUROLOGICAL SURGERY

## 2024-01-21 PROCEDURE — 25000003 PHARM REV CODE 250: Performed by: PSYCHIATRY & NEUROLOGY

## 2024-01-21 PROCEDURE — 80053 COMPREHEN METABOLIC PANEL: CPT

## 2024-01-21 PROCEDURE — 11000001 HC ACUTE MED/SURG PRIVATE ROOM

## 2024-01-21 RX ADMIN — FLUDROCORTISONE ACETATE 50 MCG: 0.1 TABLET ORAL at 01:01

## 2024-01-21 RX ADMIN — LEVOTHYROXINE SODIUM 25 MCG: 25 TABLET ORAL at 05:01

## 2024-01-21 RX ADMIN — METHOCARBAMOL TABLETS 750 MG: 750 TABLET, COATED ORAL at 05:01

## 2024-01-21 RX ADMIN — OXYCODONE HYDROCHLORIDE 5 MG: 5 TABLET ORAL at 02:01

## 2024-01-21 RX ADMIN — METHOCARBAMOL TABLETS 750 MG: 750 TABLET, COATED ORAL at 01:01

## 2024-01-21 RX ADMIN — ENOXAPARIN SODIUM 40 MG: 100 INJECTION SUBCUTANEOUS at 05:01

## 2024-01-21 RX ADMIN — PREGABALIN 225 MG: 150 CAPSULE ORAL at 10:01

## 2024-01-21 RX ADMIN — NIMODIPINE 60 MG: 30 CAPSULE, LIQUID FILLED ORAL at 02:01

## 2024-01-21 RX ADMIN — NIMODIPINE 60 MG: 30 CAPSULE, LIQUID FILLED ORAL at 09:01

## 2024-01-21 RX ADMIN — NIMODIPINE 60 MG: 30 CAPSULE, LIQUID FILLED ORAL at 05:01

## 2024-01-21 RX ADMIN — SENNOSIDES AND DOCUSATE SODIUM 2 TABLET: 50; 8.6 TABLET ORAL at 09:01

## 2024-01-21 RX ADMIN — NIMODIPINE 60 MG: 30 CAPSULE, LIQUID FILLED ORAL at 10:01

## 2024-01-21 RX ADMIN — SODIUM CHLORIDE 2000 MG: 1 TABLET ORAL at 10:01

## 2024-01-21 RX ADMIN — OXYCODONE HYDROCHLORIDE 5 MG: 5 TABLET ORAL at 05:01

## 2024-01-21 RX ADMIN — PREGABALIN 225 MG: 150 CAPSULE ORAL at 09:01

## 2024-01-21 RX ADMIN — OXYCODONE HYDROCHLORIDE 10 MG: 10 TABLET ORAL at 09:01

## 2024-01-21 RX ADMIN — METHOCARBAMOL TABLETS 750 MG: 750 TABLET, COATED ORAL at 09:01

## 2024-01-21 RX ADMIN — SENNOSIDES AND DOCUSATE SODIUM 2 TABLET: 50; 8.6 TABLET ORAL at 10:01

## 2024-01-21 RX ADMIN — POLYETHYLENE GLYCOL 3350 17 G: 17 POWDER, FOR SOLUTION ORAL at 09:01

## 2024-01-21 RX ADMIN — OXYCODONE HYDROCHLORIDE 10 MG: 10 TABLET ORAL at 01:01

## 2024-01-21 RX ADMIN — SODIUM CHLORIDE 2000 MG: 1 TABLET ORAL at 05:01

## 2024-01-21 RX ADMIN — METHOCARBAMOL TABLETS 750 MG: 750 TABLET, COATED ORAL at 10:01

## 2024-01-21 RX ADMIN — POLYETHYLENE GLYCOL 3350 17 G: 17 POWDER, FOR SOLUTION ORAL at 10:01

## 2024-01-21 NOTE — PLAN OF CARE
Problem: Skin Injury Risk Increased  Goal: Skin Health and Integrity  Outcome: Ongoing, Not Progressing  Intervention: Optimize Skin Protection  Flowsheets (Taken 1/20/2024 1942)  Head of Bed (HOB) Positioning:   HOB elevated   HOB lowered

## 2024-01-21 NOTE — PROGRESS NOTES
Santos CONTRERAS, spoke with Dr. Mejia about patient's slurred/gargled and word finding speech. Patient was also having intermittent confusion. No new orders, provider came to bedside. [id number].

## 2024-01-21 NOTE — ASSESSMENT & PLAN NOTE
59 yo F with PMH of hypothyroidism who presents as transfer from [employer] due to thunderclap headache that started suddenly on Friday, 6 days ago. She reports her headache has worsened more and more over the last week so she came to the hospital. She denies any illicit drug use or blood thinners. She reports her headache is a 10 out of 10.     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   Per report there was xanthochromia on LP performed at OSH on 11/10/24.     Presumably post bleed day 8  Coil embolization of Acomm 1/11    - admitted to [facility], Floor   - SAH protocol // vasospasm watch   - keppra for seizure ppx   - Na > 135   - nimotop x21 days  - SBP < 180   - on room air, ctm  - strict I/O's; keep euvolemic to 1L net positive  - minimize narcotics for HA control  - hold any blood thinners    Dispo: possibly home tomorrow

## 2024-01-21 NOTE — PROGRESS NOTES
Jani Grimaldo - Neurosurgery ([facility])  Neurosurgery  Progress Note    Subjective:     History of Present Illness: 59 yo F with PMH of hypothyroidism who presents as transfer from [employer] due to thunderclap headache that started suddenly on Friday, 6 days ago. She reports her headache has worsened more and more over the last week so she came to the hospital. She denies any illicit drug use or blood thinners. She reports her headache is a 10 out of 10. 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 Per report there was xanthochromia on LP performed at OSH on 11/10/24. NSGY consulted for aneurysm.     Post-Op Info:  * No surgery found *       Interval History: NAEON. Pt doing well this morning. Possible TIA episode yesterday with dysarthria and word finding difficulty but pt is back to baseline.    Medications:  Continuous Infusions:  Scheduled Meds:   enoxparin  40 mg Subcutaneous Q24H (prophylaxis, 1700)    fludrocortisone  50 mcg Oral Daily    levothyroxine  25 mcg Oral Before breakfast    LIDOcaine  1 patch Transdermal Q24H    methocarbamoL  750 mg Oral QID    niMODipine  60 mg Oral Q4H    polyethylene glycol  17 g Oral BID    pregabalin  225 mg Oral BID    senna-docusate 8.6-50 mg  2 tablet Oral BID    sodium chloride  2,000 mg Oral TID     PRN Meds:acetaminophen, bisacodyL, hydrALAZINE, labetalol, magnesium oxide, magnesium oxide, ondansetron, oxyCODONE, oxyCODONE, potassium bicarbonate, potassium bicarbonate, potassium bicarbonate, potassium, sodium phosphates, potassium, sodium phosphates, potassium, sodium phosphates     Review of Systems  Objective:     Weight: 97.1 kg (214 lb)  Body mass index is 33.52 kg/m².  Vital Signs (Most Recent):  Temp: 98.1 °F (36.7 °C) (01/21/24 0413)  Pulse: 86 (01/21/24 0413)  Resp: 18 (01/21/24 0509)  BP: 132/60 (01/21/24 0507)  SpO2: (!) 93 % (01/21/24 0413) Vital Signs (24h Range):  Temp:  [97.2 °F (36.2 °C)-100.6 °F (38.1 °C)]  "98.1 °F (36.7 °C)  Pulse:  [72-98] 86  Resp:  [18] 18  SpO2:  [93 %-97 %] 93 %  BP: (112-151)/(53-71) 132/60                                 Physical Exam         Neurosurgery Physical Exam    General: well developed, well nourished, no distress.   HEENT: normocephalic, atraumatic  CV: regular rate   Pulmonary: normal respirations, no signs of respiratory distress  Abdomen: soft, non-distended, not tender to palpation  Skin: Skin is warm, dry and intact  Heme: no bruising    Neuro:   Mental Status: AO x4, no aphasia, no dysarthria   CN: PERRL, EOMI, VF intact to confrontation, sensation intact bilaterally, eyebrow raise and grimace symmetric, tongue midline  Motor: moves all extremities spontaneously, full strength throughout, no pronator drift   Sensory: intact to light touch throughout  Cerebellar: finger to nose intact bilaterally  Reflexes: -Rodriguez's, -Babinski, no clonus. Patellar: 2+ bilaterally     Significant Labs:  Recent Labs   Lab 01/20/24  0207 01/21/24  0426    126*    137   K 3.9 3.9    101   CO2 26 27   BUN 12 13   CREATININE 0.7 0.7   CALCIUM 10.0 9.7     Recent Labs   Lab 01/20/24 0207 01/21/24  0426   WBC 11.72 8.32   HGB 11.2* 10.5*   HCT 35.3* 32.2*    375     No results for input(s): "LABPT", "INR", "APTT" in the last 48 hours.  Microbiology Results (last 7 days)       ** No results found for the last 168 hours. **          All pertinent labs from the last 24 hours have been reviewed.    Significant Diagnostics:  I have reviewed all pertinent imaging results/findings within the past 24 hours.  Assessment/Plan:     * Anterior communicating artery aneurysm  57 yo F with PMH of hypothyroidism who presents as transfer from [employer] due to thunderclap headache that started suddenly on Friday, 6 days ago. She reports her headache has worsened more and more over the last week so she came to the hospital. She denies any illicit drug use or blood thinners. She reports her " headache is a 10 out of 10.     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   Per report there was xanthochromia on LP performed at OSH on 11/10/24.     Presumably post bleed day 8  Coil embolization of Acomm 1/11    - admitted to [facility], Floor   - SAH protocol // vasospasm watch   - keppra for seizure ppx   - Na > 135   - nimotop x21 days  - SBP < 180   - on room air, ctm  - strict I/O's; keep euvolemic to 1L net positive  - minimize narcotics for HA control  - hold any blood thinners    Dispo: possibly home tomorrow        Christiano Hernandez MD  Neurosurgery  [facility] Grimaldo - Neurosurgery ([facility])

## 2024-01-21 NOTE — SUBJECTIVE & OBJECTIVE
Interval History: NAEON. Pt doing well this morning. Possible TIA episode yesterday with dysarthria and word finding difficulty but pt is back to baseline.    Medications:  Continuous Infusions:  Scheduled Meds:   enoxparin  40 mg Subcutaneous Q24H (prophylaxis, 1700)    fludrocortisone  50 mcg Oral Daily    levothyroxine  25 mcg Oral Before breakfast    LIDOcaine  1 patch Transdermal Q24H    methocarbamoL  750 mg Oral QID    niMODipine  60 mg Oral Q4H    polyethylene glycol  17 g Oral BID    pregabalin  225 mg Oral BID    senna-docusate 8.6-50 mg  2 tablet Oral BID    sodium chloride  2,000 mg Oral TID     PRN Meds:acetaminophen, bisacodyL, hydrALAZINE, labetalol, magnesium oxide, magnesium oxide, ondansetron, oxyCODONE, oxyCODONE, potassium bicarbonate, potassium bicarbonate, potassium bicarbonate, potassium, sodium phosphates, potassium, sodium phosphates, potassium, sodium phosphates     Review of Systems  Objective:     Weight: 97.1 kg (214 lb)  Body mass index is 33.52 kg/m².  Vital Signs (Most Recent):  Temp: 98.1 °F (36.7 °C) (01/21/24 0413)  Pulse: 86 (01/21/24 0413)  Resp: 18 (01/21/24 0509)  BP: 132/60 (01/21/24 0507)  SpO2: (!) 93 % (01/21/24 0413) Vital Signs (24h Range):  Temp:  [97.2 °F (36.2 °C)-100.6 °F (38.1 °C)] 98.1 °F (36.7 °C)  Pulse:  [72-98] 86  Resp:  [18] 18  SpO2:  [93 %-97 %] 93 %  BP: (112-151)/(53-71) 132/60                                 Physical Exam         Neurosurgery Physical Exam    General: well developed, well nourished, no distress.   HEENT: normocephalic, atraumatic  CV: regular rate   Pulmonary: normal respirations, no signs of respiratory distress  Abdomen: soft, non-distended, not tender to palpation  Skin: Skin is warm, dry and intact  Heme: no bruising    Neuro:   Mental Status: AO x4, no aphasia, no dysarthria   CN: PERRL, EOMI, VF intact to confrontation, sensation intact bilaterally, eyebrow raise and grimace symmetric, tongue midline  Motor: moves all extremities  "spontaneously, full strength throughout, no pronator drift   Sensory: intact to light touch throughout  Cerebellar: finger to nose intact bilaterally  Reflexes: -Rodriguez's, -Babinski, no clonus. Patellar: 2+ bilaterally     Significant Labs:  Recent Labs   Lab 01/20/24 0207 01/21/24  0426    126*    137   K 3.9 3.9    101   CO2 26 27   BUN 12 13   CREATININE 0.7 0.7   CALCIUM 10.0 9.7     Recent Labs   Lab 01/20/24 0207 01/21/24 0426   WBC 11.72 8.32   HGB 11.2* 10.5*   HCT 35.3* 32.2*    375     No results for input(s): "LABPT", "INR", "APTT" in the last 48 hours.  Microbiology Results (last 7 days)       ** No results found for the last 168 hours. **          All pertinent labs from the last 24 hours have been reviewed.    Significant Diagnostics:  I have reviewed all pertinent imaging results/findings within the past 24 hours.  "

## 2024-01-22 LAB
ALBUMIN SERPL BCP-MCNC: 3 G/DL (ref 3.5–5.2)
ALP SERPL-CCNC: 119 U/L (ref 55–135)
ALT SERPL W/O P-5'-P-CCNC: 66 U/L (ref 10–44)
ANION GAP SERPL CALC-SCNC: 7 MMOL/L (ref 8–16)
AST SERPL-CCNC: 33 U/L (ref 10–40)
BASOPHILS # BLD AUTO: 0.05 K/UL (ref 0–0.2)
BASOPHILS NFR BLD: 0.5 % (ref 0–1.9)
BILIRUB SERPL-MCNC: 0.2 MG/DL (ref 0.1–1)
BUN SERPL-MCNC: 16 MG/DL (ref 6–20)
CALCIUM SERPL-MCNC: 9.9 MG/DL (ref 8.7–10.5)
CHLORIDE SERPL-SCNC: 102 MMOL/L (ref 95–110)
CO2 SERPL-SCNC: 30 MMOL/L (ref 23–29)
CREAT SERPL-MCNC: 0.9 MG/DL (ref 0.5–1.4)
DIFFERENTIAL METHOD BLD: ABNORMAL
EOSINOPHIL # BLD AUTO: 0.3 K/UL (ref 0–0.5)
EOSINOPHIL NFR BLD: 2.8 % (ref 0–8)
ERYTHROCYTE [DISTWIDTH] IN BLOOD BY AUTOMATED COUNT: 13.2 % (ref 11.5–14.5)
EST. GFR  (NO RACE VARIABLE): >60 ML/MIN/1.73 M^2
GLUCOSE SERPL-MCNC: 137 MG/DL (ref 70–110)
HCT VFR BLD AUTO: 32.2 % (ref 37–48.5)
HGB BLD-MCNC: 10.2 G/DL (ref 12–16)
IMM GRANULOCYTES # BLD AUTO: 0.04 K/UL (ref 0–0.04)
IMM GRANULOCYTES NFR BLD AUTO: 0.4 % (ref 0–0.5)
LYMPHOCYTES # BLD AUTO: 1.8 K/UL (ref 1–4.8)
LYMPHOCYTES NFR BLD: 19.2 % (ref 18–48)
MCH RBC QN AUTO: 28.7 PG (ref 27–31)
MCHC RBC AUTO-ENTMCNC: 31.7 G/DL (ref 32–36)
MCV RBC AUTO: 91 FL (ref 82–98)
MONOCYTES # BLD AUTO: 1.1 K/UL (ref 0.3–1)
MONOCYTES NFR BLD: 11 % (ref 4–15)
NEUTROPHILS # BLD AUTO: 6.3 K/UL (ref 1.8–7.7)
NEUTROPHILS NFR BLD: 66.1 % (ref 38–73)
NRBC BLD-RTO: 0 /100 WBC
PHOSPHATE SERPL-MCNC: 4.2 MG/DL (ref 2.7–4.5)
PLATELET # BLD AUTO: 378 K/UL (ref 150–450)
PMV BLD AUTO: 10.5 FL (ref 9.2–12.9)
POTASSIUM SERPL-SCNC: 4.4 MMOL/L (ref 3.5–5.1)
PROT SERPL-MCNC: 7 G/DL (ref 6–8.4)
RBC # BLD AUTO: 3.55 M/UL (ref 4–5.4)
SODIUM SERPL-SCNC: 139 MMOL/L (ref 136–145)
WBC # BLD AUTO: 9.54 K/UL (ref 3.9–12.7)

## 2024-01-22 PROCEDURE — 63600175 PHARM REV CODE 636 W HCPCS: Performed by: PSYCHIATRY & NEUROLOGY

## 2024-01-22 PROCEDURE — 97116 GAIT TRAINING THERAPY: CPT

## 2024-01-22 PROCEDURE — 36415 COLL VENOUS BLD VENIPUNCTURE: CPT

## 2024-01-22 PROCEDURE — 97129 THER IVNTJ 1ST 15 MIN: CPT

## 2024-01-22 PROCEDURE — 97130 THER IVNTJ EA ADDL 15 MIN: CPT

## 2024-01-22 PROCEDURE — 99233 SBSQ HOSP IP/OBS HIGH 50: CPT | Mod: ,,, | Performed by: NEUROLOGICAL SURGERY

## 2024-01-22 PROCEDURE — 85025 COMPLETE CBC W/AUTO DIFF WBC: CPT

## 2024-01-22 PROCEDURE — 25000003 PHARM REV CODE 250

## 2024-01-22 PROCEDURE — 80053 COMPREHEN METABOLIC PANEL: CPT

## 2024-01-22 PROCEDURE — 84100 ASSAY OF PHOSPHORUS: CPT

## 2024-01-22 PROCEDURE — 25000003 PHARM REV CODE 250: Performed by: NURSE PRACTITIONER

## 2024-01-22 PROCEDURE — 25000003 PHARM REV CODE 250: Performed by: STUDENT IN AN ORGANIZED HEALTH CARE EDUCATION/TRAINING PROGRAM

## 2024-01-22 PROCEDURE — 25000003 PHARM REV CODE 250: Performed by: PSYCHIATRY & NEUROLOGY

## 2024-01-22 PROCEDURE — 20600001 HC STEP DOWN PRIVATE ROOM

## 2024-01-22 RX ORDER — ACETAMINOPHEN 325 MG/1
650 TABLET ORAL EVERY 6 HOURS
Status: DISCONTINUED | OUTPATIENT
Start: 2024-01-22 | End: 2024-01-24 | Stop reason: HOSPADM

## 2024-01-22 RX ADMIN — ENOXAPARIN SODIUM 40 MG: 100 INJECTION SUBCUTANEOUS at 06:01

## 2024-01-22 RX ADMIN — SENNOSIDES AND DOCUSATE SODIUM 2 TABLET: 50; 8.6 TABLET ORAL at 10:01

## 2024-01-22 RX ADMIN — SENNOSIDES AND DOCUSATE SODIUM 2 TABLET: 50; 8.6 TABLET ORAL at 09:01

## 2024-01-22 RX ADMIN — ACETAMINOPHEN 650 MG: 325 TABLET ORAL at 12:01

## 2024-01-22 RX ADMIN — SODIUM CHLORIDE 2000 MG: 1 TABLET ORAL at 10:01

## 2024-01-22 RX ADMIN — METHOCARBAMOL TABLETS 750 MG: 750 TABLET, COATED ORAL at 09:01

## 2024-01-22 RX ADMIN — METHOCARBAMOL TABLETS 750 MG: 750 TABLET, COATED ORAL at 10:01

## 2024-01-22 RX ADMIN — POLYETHYLENE GLYCOL 3350 17 G: 17 POWDER, FOR SOLUTION ORAL at 10:01

## 2024-01-22 RX ADMIN — OXYCODONE HYDROCHLORIDE 10 MG: 10 TABLET ORAL at 05:01

## 2024-01-22 RX ADMIN — PREGABALIN 225 MG: 150 CAPSULE ORAL at 09:01

## 2024-01-22 RX ADMIN — OXYCODONE HYDROCHLORIDE 10 MG: 10 TABLET ORAL at 06:01

## 2024-01-22 RX ADMIN — LEVOTHYROXINE SODIUM 25 MCG: 25 TABLET ORAL at 05:01

## 2024-01-22 RX ADMIN — NIMODIPINE 60 MG: 30 CAPSULE, LIQUID FILLED ORAL at 05:01

## 2024-01-22 RX ADMIN — METHOCARBAMOL TABLETS 750 MG: 750 TABLET, COATED ORAL at 12:01

## 2024-01-22 RX ADMIN — SODIUM CHLORIDE 2000 MG: 1 TABLET ORAL at 09:01

## 2024-01-22 RX ADMIN — SODIUM CHLORIDE 2000 MG: 1 TABLET ORAL at 02:01

## 2024-01-22 RX ADMIN — NIMODIPINE 60 MG: 30 CAPSULE, LIQUID FILLED ORAL at 02:01

## 2024-01-22 RX ADMIN — NIMODIPINE 60 MG: 30 CAPSULE, LIQUID FILLED ORAL at 06:01

## 2024-01-22 RX ADMIN — METHOCARBAMOL TABLETS 750 MG: 750 TABLET, COATED ORAL at 06:01

## 2024-01-22 RX ADMIN — OXYCODONE HYDROCHLORIDE 5 MG: 5 TABLET ORAL at 01:01

## 2024-01-22 RX ADMIN — PREGABALIN 225 MG: 150 CAPSULE ORAL at 10:01

## 2024-01-22 RX ADMIN — OXYCODONE HYDROCHLORIDE 10 MG: 10 TABLET ORAL at 12:01

## 2024-01-22 RX ADMIN — NIMODIPINE 60 MG: 30 CAPSULE, LIQUID FILLED ORAL at 09:01

## 2024-01-22 RX ADMIN — OXYCODONE HYDROCHLORIDE 5 MG: 5 TABLET ORAL at 10:01

## 2024-01-22 RX ADMIN — POLYETHYLENE GLYCOL 3350 17 G: 17 POWDER, FOR SOLUTION ORAL at 09:01

## 2024-01-22 RX ADMIN — NIMODIPINE 60 MG: 30 CAPSULE, LIQUID FILLED ORAL at 10:01

## 2024-01-22 RX ADMIN — ACETAMINOPHEN 650 MG: 325 TABLET ORAL at 06:01

## 2024-01-22 NOTE — PLAN OF CARE
Jani Grimaldo - Neurosurgery ([facility])  Discharge Reassessment    Primary Care Provider: Rosa Sterling    Expected Discharge Date: 1/24/2024    Patient is not medically ready for discharge.  Patient noted to be low intensity.  Anticipate patient to discharge home with needs or possibly home health once medically ready.    Discharge Plan A and Plan B have been determined by review of patient's clinical status, future medical and therapeutic needs, and coverage/benefits for post-acute care in coordination with multidisciplinary team members.     Reassessment (most recent)       Discharge Reassessment - 01/22/24 1532          Discharge Reassessment    Assessment Type Discharge Planning Reassessment     Did the patient's condition or plan change since previous assessment? Yes     Discharge Plan discussed with: Patient     Communicated CLEO with patient/caregiver Date not available/Unable to determine     Discharge Plan A Home with family     Discharge Plan B Home with family;Home Health     DME Needed Upon Discharge  none     Transition of Care Barriers None     Why the patient remains in the hospital Requires continued medical care        Post-Acute Status    Discharge Delays None known at this time

## 2024-01-22 NOTE — PT/OT/SLP PROGRESS
Physical Therapy Treatment    Patient Name:  Loreto Goff   MRN:  [id number]    Recent Surgery: * No surgery found *      Recommendations:     Discharge Recommendations:    Low intensity therapy  Discharge Equipment Recommendations: none   Barriers to discharge: None    Highest Level of Mobility: Gait 80'  Assistance Required: CGA-min(a) w/ no AD    Assessment:     Loreto Goff is a 58 y.o. female admitted with a medical diagnosis of Anterior communicating artery aneurysm.    Pt met seated in bedside chair and agreeable to PT treatment. Today's PT treatment focus was on continued gait training to improve activity tolerance and address gait deviations. Pt limited today by a severe headache that increased during ambulation. Pt also displays impulsive behavior (trying to peek into other pt's rooms) and required cues for safety awareness throughout session. She had mild LOB today while ambulating and was able to correct using step strategy. She refused a gait trial with RW.     Pt is progressing towards acute PT goals appropriately and continues to benefit from acute PT sessions.     Rehab Prognosis: Good; patient would benefit from acute skilled PT services to address these deficits and reach maximum level of function.      Plan:     During this hospitalization, patient to be seen 4 x/week to address the identified rehab impairments via gait training, therapeutic activities, therapeutic exercises and progress toward the following goals:    Plan of Care Expires:  02/13/24    This plan of care has been discussed with the patient/caregiver, who was included in its development and is in agreement with the identified goals and treatment plan.     Subjective     Communicated with RN prior to session.  Patient agreeable to participate.     Pain/Comfort:  Pain Rating 1: 9/10  Location - Orientation 1: generalized  Location 1: head  Pain Addressed 1: Reposition, Distraction  Pain Rating Post-Intervention 1: 9/10    Chief  "Complaint: Anterior communicating artery aneurysm   Patient/Family Comments/goals: "They won't let me walk by myself" (PT reinforced that pt is able to ambulate with staff assist)      Objective:     Patient found up in chair with peripheral IV, pulse ox (continuous), telemetry  upon PT entry to room.    General Precautions: Standard, fall   Orthopedic Precautions:N/A   Braces: N/A         Exams:    Cognition:  Patient is oriented to Person, Place, Time, Situation  Follows one-step commands, is easily distracted  Insight to deficits/safety awareness: impaired    Functional Mobility:    Bed Mobility:  Not assessed     Transfers:   Sit to Stand Transfer: Supervision or Set-up Assistance  from bedside chair with no AD              Gait:  Patient received gait training in hallway 35 feet, then 80 with Contact Guard Assistance and Minimal Assistance and  no AD  Gait Assessment: occasional unsteady gait, narrow base of support, flexed posture, decreased karson, and occasional L LE scissoring over R  Gait Pattern Observed: Step-to   Comments: All lines remained intact throughout ambulation trial, gait belt utilized. Pt demos impulsive behavior and attempts to look into other pt's rooms. PT provided cues for redirection. Pt at times spontaneously closes her eyes due to pain, requires cues from therapist to maintain eyes open. PT attempted to provided pt a RW for balance assist, pt refused    Balance:  Static Stand:   Contact-Guard Assist with no AD  Dynamic Stand:  CGA-min(a)  with no AD      Therapeutic Activities/Exercises     Patient assisted with functional mobility as noted above  Patient instructed to ambulate with hospital staff at least 3x per day  Patient educated on the importance of early mobility to prevent functional decline during hospital stay  Patient was instructed to utilize staff assistance for mobility/transfers.  Patient is appropriate to transfer with CGA-min(A) and RN/PCT assist  Patient educated on " PT POC and role of PT in acute care  White board updated regarding patient's safest level of mobility with staff assistance, RN also updated.     AM-PAC 6 CLICK MOBILITY  Turning over in bed (including adjusting bedclothes, sheets and blankets)?: 4  Sitting down on and standing up from a chair with arms (e.g., wheelchair, bedside commode, etc.): 4  Moving from lying on back to sitting on the side of the bed?: 4  Moving to and from a bed to a chair (including a wheelchair)?: 4  Need to walk in hospital room?: 3  Climbing 3-5 steps with a railing?: 3  Basic Mobility Total Score: 22     Patient left up in chair with all lines intact, call button in reach, and rn notified.        History/Goals:     PAST MEDICAL HISTORY:  History reviewed. No pertinent past medical history.    History reviewed. No pertinent surgical history.    GOALS:   Multidisciplinary Problems       Physical Therapy Goals          Problem: Physical Therapy    Goal Priority Disciplines Outcome Goal Variances Interventions   Physical Therapy Goal     PT, PT/OT Ongoing, Progressing     Description: Goals to be met by: 24     Patient will increase functional independence with mobility by performin. Supine to sit with Modified [place]  2. Sit to supine with Modified [place]  3. Sit to stand transfer with Modified [place]  4. Bed to chair transfer with Stand-by Assistance using LRAD as needed  5. Gait  x 150 feet with Stand-by Assistance using LRAD as needed.   6. Ascend/descend 2 stair with no Handrails and Stand-by Assistance  7. Lower extremity exercise program x15 reps per handout, with assistance as needed                         Time Tracking:     PT Received On: 24  PT Start Time: 1024     PT Stop Time: 1052  PT Total Time (min): 28 min     Billable Minutes: Gait Training 28      Agata Mckeon, PT  2024  Pager# [phone]

## 2024-01-22 NOTE — PT/OT/SLP PROGRESS
"Speech Language Pathology Treatment    Patient Name:  Loreto Goff   MRN:  [id number]  Admitting Diagnosis: Anterior communicating artery aneurysm    Recommendations:                 General Recommendations:  Cognitive-linguistic therapy  Diet recommendations:  Regular Diet - IDDSI Level 7, Liquid Diet Level: Thin liquids - IDDSI Level 0   Aspiration Precautions: Standard aspiration precautions   General Precautions: Standard, fall  Communication strategies:  none    Assessment:     Loreto Goff is a 58 y.o. female with an SLP diagnosis of Cognitive-Linguistic Impairment.      Subjective     "I see 2 of you." Pt reporting double vision at end of session.  Medical team and nurse notified via Secure Chat.     Pain/Comfort:  Pain Rating 1:  (did not rate - pt has persistent headache)    Respiratory Status: Room air    Objective:     Has the patient been evaluated by SLP for swallowing?   Yes  Keep patient NPO? No   Current Respiratory Status:        Pt seen for ongoing cognitive therapy. Pt completed a mental manipulation task ranking words in fo4 with 100% accuracy given need for repetition x 1. Following a 2 minute delay, pt recalled 3/3 unrelated words without A. PT solved functional math word problems with 90% accuracy given repetition x 2, increasing to 100% given additional cues.  Pt completed a clock drawing with lower half of numbers placed too close to middle of clock.  Pt displayed awareness of this, however, and appeared likely due to pt's recline positioned in bed when completing task.  Pt was able to place hands on clock appropriately to display current time (2:35pm). Cont POC.     Goals:   Multidisciplinary Problems       SLP Goals          Problem: SLP    Goal Priority Disciplines Outcome   SLP Goal     SLP    Description: Speech Language Pathology Goals  Updated goals expected to be met 1/24:   1. Following a delay, pt will recall 3/3 novel items with supervision.   2. Pt will complete math/money/time " management tasks with 80% accuracy given min cues.     Goals expected to be met by 1/19:  1. Pt will O x 4. Goal met  2. Pt will recall general information with 70% accuracy. Goal met  3. Following a delay, pt will recall 3/3 novel items with supervision. ongoing  4. Pt will answer complex yes/no q's with 80% accuracy given mod cues. Goal met  5. Pt will follow 2-step commands with 70% accuracy given max cues. Goal met  6. Pt will answer simple problem solving q's with 70% accuracy given min cues. Goal met                               Plan:     Patient to be seen:  3 x/week   Plan of Care expires:  02/11/24  Plan of Care reviewed with:  patient   SLP Follow-Up:  Yes       Discharge recommendations:  High Intensity Therapy     Time Tracking:     SLP Treatment Date:   01/22/24  Speech Start Time:  1411  Speech Stop Time:  1439     Speech Total Time (min):  28 min    Billable Minutes: Speech Therapy Individual (cognitive therapy, 2 units) 28    01/22/2024

## 2024-01-22 NOTE — PROGRESS NOTES
[facility] - Neurosurgery ([facility])  Neurosurgery  Progress Note    Subjective:     History of Present Illness: 59 yo F with PMH of hypothyroidism who presents as transfer from [facility] due to thunderclap headache that started suddenly on Friday, 6 days ago. She reports her headache has worsened more and more over the last week so she came to the hospital. She denies any illicit drug use or blood thinners. She reports her headache is a 10 out of 10. 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 Per report there was xanthochromia on LP performed at OSH on 11/10/24. NSGY consulted for aneurysm.     Post-Op Info:  * No surgery found *       Interval History: 1/22: NAEO. PBD 17. Patient still complains of persistent HA but is intact on exam. Analgesic regimen altered, will CTM. Remains on SAH protocol.     Medications:  Continuous Infusions:  Scheduled Meds:   acetaminophen  650 mg Oral Q6H    enoxparin  40 mg Subcutaneous Q24H (prophylaxis, 1700)    levothyroxine  25 mcg Oral Before breakfast    LIDOcaine  1 patch Transdermal Q24H    methocarbamoL  750 mg Oral QID    niMODipine  60 mg Oral Q4H    polyethylene glycol  17 g Oral BID    pregabalin  225 mg Oral BID    senna-docusate 8.6-50 mg  2 tablet Oral BID    sodium chloride  2,000 mg Oral TID     PRN Meds:bisacodyL, hydrALAZINE, labetalol, magnesium oxide, magnesium oxide, ondansetron, oxyCODONE, oxyCODONE, potassium bicarbonate, potassium bicarbonate, potassium bicarbonate, potassium, sodium phosphates, potassium, sodium phosphates, potassium, sodium phosphates     Review of Systems  Objective:     Weight: 97.1 kg (214 lb)  Body mass index is 33.52 kg/m².  Vital Signs (Most Recent):  Temp: 99 °F (37.2 °C) (01/22/24 0807)  Pulse: 79 (01/22/24 0807)  Resp: 18 (01/22/24 0807)  BP: 132/63 (01/22/24 0807)  SpO2: 95 % (01/22/24 0807) Vital Signs (24h Range):  Temp:  [97.4 °F (36.3 °C)-99 °F (37.2 °C)] 99 °F (37.2  pls inform pt that I discussed this with Dr Ashley Kingsley (via staff message), and we agreed for pt to try to increase gabapentin, either 600 mg BID if it doesnt make her too tired, or 300 mg in AM, 300 mg noon, and 600 mg bedtime  I appreciate pt's patience. "°C)  Pulse:  [75-92] 79  Resp:  [18] 18  SpO2:  [95 %-98 %] 95 %  BP: (132-156)/(63-84) 132/63     Physical Exam  General: well developed, well nourished, no distress.   Head: normocephalic, atraumatic  Mental Status: Awake, Alert, Oriented  Speech: Clear with content appropriate to conversation  Cranial nerves: CN II-XII grossly intact.   Sensory: intact to light touch throughout    Motor Strength: Moves all extremities spontaneously with good tone.  Full strength upper and lower extremities. No abnormal movements seen.     Observed getting up out of bed to sit upright at the edge of the bed       Significant Labs:  Recent Labs   Lab 01/21/24  0426 01/22/24  0444   * 137*    139   K 3.9 4.4    102   CO2 27 30*   BUN 13 16   CREATININE 0.7 0.9   CALCIUM 9.7 9.9     Recent Labs   Lab 01/21/24  0426 01/22/24  0444   WBC 8.32 9.54   HGB 10.5* 10.2*   HCT 32.2* 32.2*    378     No results for input(s): "LABPT", "INR", "APTT" in the last 48 hours.  Microbiology Results (last 7 days)       ** No results found for the last 168 hours. **          All pertinent labs from the last 24 hours have been reviewed.    Significant Diagnostics:  No new relevant imaging to discuss.   Assessment/Plan:     * Anterior communicating artery aneurysm  57 yo F with PMH of hypothyroidism who presents as transfer from [facility] due to thunderclap headache that started suddenly on Friday, 6 days ago. She reports her headache has worsened more and more over the last week so she came to the hospital. She denies any illicit drug use or blood thinners. She reports her headache is a 10 out of 10.     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   Per report there was xanthochromia on LP performed at OSH on 11/10/24.     Now s/p coil embolization of Acomm 1/11    - PBD 17  - Admitted to NPU with q4 neuro checks  - SAH protocol // vasospasm watch   - Na > 135   - nimotop x21 " days  - SBP < 180   - strict I/O's; keep euvolemic to 1L net positive    Dispo: possibly home tomorrow    Hyponatremia  --Resolved, will CTM    Acquired hypothyroidism  --Continue home levothyroxine    Essential hypertension  --On Nimodipine per SAH protocol        Clarita Carey PA-C  Neurosurgery  [facility] - Neurosurgery ([facility])

## 2024-01-22 NOTE — PLAN OF CARE
Problem: Pain (Stroke, Hemorrhagic)  Goal: Acceptable Pain Control  Outcome: Ongoing, Progressing  Intervention: Monitor and Manage Pain  Flowsheets (Taken 1/21/2024 2009)  Pain Management Interventions: care clustered

## 2024-01-22 NOTE — SUBJECTIVE & OBJECTIVE
Interval History: 1/22: NAEO. PBD 17. Patient still complains of persistent HA but is intact on exam. Analgesic regimen altered, will CTM. Remains on SAH protocol.     Medications:  Continuous Infusions:  Scheduled Meds:   acetaminophen  650 mg Oral Q6H    enoxparin  40 mg Subcutaneous Q24H (prophylaxis, 1700)    levothyroxine  25 mcg Oral Before breakfast    LIDOcaine  1 patch Transdermal Q24H    methocarbamoL  750 mg Oral QID    niMODipine  60 mg Oral Q4H    polyethylene glycol  17 g Oral BID    pregabalin  225 mg Oral BID    senna-docusate 8.6-50 mg  2 tablet Oral BID    sodium chloride  2,000 mg Oral TID     PRN Meds:bisacodyL, hydrALAZINE, labetalol, magnesium oxide, magnesium oxide, ondansetron, oxyCODONE, oxyCODONE, potassium bicarbonate, potassium bicarbonate, potassium bicarbonate, potassium, sodium phosphates, potassium, sodium phosphates, potassium, sodium phosphates     Review of Systems  Objective:     Weight: 97.1 kg (214 lb)  Body mass index is 33.52 kg/m².  Vital Signs (Most Recent):  Temp: 99 °F (37.2 °C) (01/22/24 0807)  Pulse: 79 (01/22/24 0807)  Resp: 18 (01/22/24 0807)  BP: 132/63 (01/22/24 0807)  SpO2: 95 % (01/22/24 0807) Vital Signs (24h Range):  Temp:  [97.4 °F (36.3 °C)-99 °F (37.2 °C)] 99 °F (37.2 °C)  Pulse:  [75-92] 79  Resp:  [18] 18  SpO2:  [95 %-98 %] 95 %  BP: (132-156)/(63-84) 132/63     Physical Exam  General: well developed, well nourished, no distress.   Head: normocephalic, atraumatic  Mental Status: Awake, Alert, Oriented  Speech: Clear with content appropriate to conversation  Cranial nerves: CN II-XII grossly intact.   Sensory: intact to light touch throughout    Motor Strength: Moves all extremities spontaneously with good tone.  Full strength upper and lower extremities. No abnormal movements seen.     Observed getting up out of bed to sit upright at the edge of the bed       Significant Labs:  Recent Labs   Lab 01/21/24  0426 01/22/24  0444   * 137*    139   K  "3.9 4.4    102   CO2 27 30*   BUN 13 16   CREATININE 0.7 0.9   CALCIUM 9.7 9.9     Recent Labs   Lab 01/21/24  0426 01/22/24  0444   WBC 8.32 9.54   HGB 10.5* 10.2*   HCT 32.2* 32.2*    378     No results for input(s): "LABPT", "INR", "APTT" in the last 48 hours.  Microbiology Results (last 7 days)       ** No results found for the last 168 hours. **          All pertinent labs from the last 24 hours have been reviewed.    Significant Diagnostics:  No new relevant imaging to discuss.   "

## 2024-01-22 NOTE — ASSESSMENT & PLAN NOTE
59 yo F with PMH of hypothyroidism who presents as transfer from [facility] due to thunderclap headache that started suddenly on Friday, 6 days ago. She reports her headache has worsened more and more over the last week so she came to the hospital. She denies any illicit drug use or blood thinners. She reports her headache is a 10 out of 10.     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   Per report there was xanthochromia on LP performed at OSH on 11/10/24.     Now s/p coil embolization of Acomm 1/11    - PBD 17  - Admitted to NPU with q4 neuro checks  - SAH protocol // vasospasm watch   - Na > 135   - nimotop x21 days  - SBP < 180   - strict I/O's; keep euvolemic to 1L net positive    Dispo: possibly home tomorrow

## 2024-01-23 LAB
ALBUMIN SERPL BCP-MCNC: 2.8 G/DL (ref 3.5–5.2)
ALP SERPL-CCNC: 97 U/L (ref 55–135)
ALT SERPL W/O P-5'-P-CCNC: 48 U/L (ref 10–44)
ANION GAP SERPL CALC-SCNC: 10 MMOL/L (ref 8–16)
AST SERPL-CCNC: 20 U/L (ref 10–40)
BASOPHILS # BLD AUTO: 0.03 K/UL (ref 0–0.2)
BASOPHILS NFR BLD: 0.5 % (ref 0–1.9)
BILIRUB SERPL-MCNC: 0.2 MG/DL (ref 0.1–1)
BUN SERPL-MCNC: 13 MG/DL (ref 6–20)
CALCIUM SERPL-MCNC: 9.4 MG/DL (ref 8.7–10.5)
CHLORIDE SERPL-SCNC: 109 MMOL/L (ref 95–110)
CO2 SERPL-SCNC: 24 MMOL/L (ref 23–29)
CREAT SERPL-MCNC: 0.7 MG/DL (ref 0.5–1.4)
DIFFERENTIAL METHOD BLD: ABNORMAL
EOSINOPHIL # BLD AUTO: 0.3 K/UL (ref 0–0.5)
EOSINOPHIL NFR BLD: 4.5 % (ref 0–8)
ERYTHROCYTE [DISTWIDTH] IN BLOOD BY AUTOMATED COUNT: 13.2 % (ref 11.5–14.5)
EST. GFR  (NO RACE VARIABLE): >60 ML/MIN/1.73 M^2
GLUCOSE SERPL-MCNC: 121 MG/DL (ref 70–110)
HCT VFR BLD AUTO: 32.4 % (ref 37–48.5)
HGB BLD-MCNC: 10.1 G/DL (ref 12–16)
IMM GRANULOCYTES # BLD AUTO: 0.03 K/UL (ref 0–0.04)
IMM GRANULOCYTES NFR BLD AUTO: 0.5 % (ref 0–0.5)
LYMPHOCYTES # BLD AUTO: 1.6 K/UL (ref 1–4.8)
LYMPHOCYTES NFR BLD: 25.5 % (ref 18–48)
MCH RBC QN AUTO: 28.8 PG (ref 27–31)
MCHC RBC AUTO-ENTMCNC: 31.2 G/DL (ref 32–36)
MCV RBC AUTO: 92 FL (ref 82–98)
MONOCYTES # BLD AUTO: 0.7 K/UL (ref 0.3–1)
MONOCYTES NFR BLD: 11.1 % (ref 4–15)
NEUTROPHILS # BLD AUTO: 3.7 K/UL (ref 1.8–7.7)
NEUTROPHILS NFR BLD: 57.9 % (ref 38–73)
NRBC BLD-RTO: 0 /100 WBC
PHOSPHATE SERPL-MCNC: 4 MG/DL (ref 2.7–4.5)
PLATELET # BLD AUTO: 376 K/UL (ref 150–450)
PMV BLD AUTO: 10.8 FL (ref 9.2–12.9)
POTASSIUM SERPL-SCNC: 4.1 MMOL/L (ref 3.5–5.1)
PROT SERPL-MCNC: 6.6 G/DL (ref 6–8.4)
RBC # BLD AUTO: 3.51 M/UL (ref 4–5.4)
SODIUM SERPL-SCNC: 143 MMOL/L (ref 136–145)
WBC # BLD AUTO: 6.42 K/UL (ref 3.9–12.7)

## 2024-01-23 PROCEDURE — 63600175 PHARM REV CODE 636 W HCPCS: Performed by: PSYCHIATRY & NEUROLOGY

## 2024-01-23 PROCEDURE — 20600001 HC STEP DOWN PRIVATE ROOM

## 2024-01-23 PROCEDURE — 25000003 PHARM REV CODE 250: Performed by: PSYCHIATRY & NEUROLOGY

## 2024-01-23 PROCEDURE — 25000003 PHARM REV CODE 250

## 2024-01-23 PROCEDURE — 92507 TX SP LANG VOICE COMM INDIV: CPT

## 2024-01-23 PROCEDURE — 97530 THERAPEUTIC ACTIVITIES: CPT

## 2024-01-23 PROCEDURE — 25000003 PHARM REV CODE 250: Performed by: NURSE PRACTITIONER

## 2024-01-23 PROCEDURE — 85025 COMPLETE CBC W/AUTO DIFF WBC: CPT

## 2024-01-23 PROCEDURE — 25000003 PHARM REV CODE 250: Performed by: STUDENT IN AN ORGANIZED HEALTH CARE EDUCATION/TRAINING PROGRAM

## 2024-01-23 PROCEDURE — 80053 COMPREHEN METABOLIC PANEL: CPT

## 2024-01-23 PROCEDURE — 99233 SBSQ HOSP IP/OBS HIGH 50: CPT | Mod: ,,, | Performed by: NEUROLOGICAL SURGERY

## 2024-01-23 PROCEDURE — 84100 ASSAY OF PHOSPHORUS: CPT

## 2024-01-23 PROCEDURE — 36415 COLL VENOUS BLD VENIPUNCTURE: CPT

## 2024-01-23 PROCEDURE — 97116 GAIT TRAINING THERAPY: CPT

## 2024-01-23 PROCEDURE — 97112 NEUROMUSCULAR REEDUCATION: CPT

## 2024-01-23 PROCEDURE — 97535 SELF CARE MNGMENT TRAINING: CPT

## 2024-01-23 RX ORDER — BUTALBITAL, ACETAMINOPHEN AND CAFFEINE 50; 325; 40 MG/1; MG/1; MG/1
1 TABLET ORAL DAILY PRN
Status: DISCONTINUED | OUTPATIENT
Start: 2024-01-23 | End: 2024-01-24

## 2024-01-23 RX ADMIN — POLYETHYLENE GLYCOL 3350 17 G: 17 POWDER, FOR SOLUTION ORAL at 09:01

## 2024-01-23 RX ADMIN — OXYCODONE HYDROCHLORIDE 10 MG: 10 TABLET ORAL at 09:01

## 2024-01-23 RX ADMIN — NIMODIPINE 60 MG: 30 CAPSULE, LIQUID FILLED ORAL at 02:01

## 2024-01-23 RX ADMIN — METHOCARBAMOL TABLETS 750 MG: 750 TABLET, COATED ORAL at 12:01

## 2024-01-23 RX ADMIN — SODIUM CHLORIDE 2000 MG: 1 TABLET ORAL at 02:01

## 2024-01-23 RX ADMIN — ACETAMINOPHEN 650 MG: 325 TABLET ORAL at 06:01

## 2024-01-23 RX ADMIN — POLYETHYLENE GLYCOL 3350 17 G: 17 POWDER, FOR SOLUTION ORAL at 10:01

## 2024-01-23 RX ADMIN — ACETAMINOPHEN 650 MG: 325 TABLET ORAL at 12:01

## 2024-01-23 RX ADMIN — OXYCODONE HYDROCHLORIDE 5 MG: 5 TABLET ORAL at 02:01

## 2024-01-23 RX ADMIN — BUTALBITAL, ACETAMINOPHEN, AND CAFFEINE 1 TABLET: 50; 325; 40 TABLET ORAL at 12:01

## 2024-01-23 RX ADMIN — METHOCARBAMOL TABLETS 750 MG: 750 TABLET, COATED ORAL at 09:01

## 2024-01-23 RX ADMIN — ACETAMINOPHEN 650 MG: 325 TABLET ORAL at 05:01

## 2024-01-23 RX ADMIN — METHOCARBAMOL TABLETS 750 MG: 750 TABLET, COATED ORAL at 10:01

## 2024-01-23 RX ADMIN — NIMODIPINE 60 MG: 30 CAPSULE, LIQUID FILLED ORAL at 05:01

## 2024-01-23 RX ADMIN — NIMODIPINE 60 MG: 30 CAPSULE, LIQUID FILLED ORAL at 10:01

## 2024-01-23 RX ADMIN — OXYCODONE HYDROCHLORIDE 10 MG: 10 TABLET ORAL at 05:01

## 2024-01-23 RX ADMIN — NIMODIPINE 60 MG: 30 CAPSULE, LIQUID FILLED ORAL at 06:01

## 2024-01-23 RX ADMIN — PREGABALIN 225 MG: 150 CAPSULE ORAL at 09:01

## 2024-01-23 RX ADMIN — ENOXAPARIN SODIUM 40 MG: 100 INJECTION SUBCUTANEOUS at 05:01

## 2024-01-23 RX ADMIN — NIMODIPINE 60 MG: 30 CAPSULE, LIQUID FILLED ORAL at 09:01

## 2024-01-23 RX ADMIN — SODIUM CHLORIDE 2000 MG: 1 TABLET ORAL at 09:01

## 2024-01-23 RX ADMIN — SENNOSIDES AND DOCUSATE SODIUM 2 TABLET: 50; 8.6 TABLET ORAL at 09:01

## 2024-01-23 RX ADMIN — SODIUM CHLORIDE 2000 MG: 1 TABLET ORAL at 10:01

## 2024-01-23 RX ADMIN — LEVOTHYROXINE SODIUM 25 MCG: 25 TABLET ORAL at 06:01

## 2024-01-23 RX ADMIN — METHOCARBAMOL TABLETS 750 MG: 750 TABLET, COATED ORAL at 05:01

## 2024-01-23 RX ADMIN — PREGABALIN 225 MG: 150 CAPSULE ORAL at 10:01

## 2024-01-23 RX ADMIN — SENNOSIDES AND DOCUSATE SODIUM 2 TABLET: 50; 8.6 TABLET ORAL at 10:01

## 2024-01-23 RX ADMIN — ACETAMINOPHEN 650 MG: 325 TABLET ORAL at 02:01

## 2024-01-23 RX ADMIN — OXYCODONE HYDROCHLORIDE 5 MG: 5 TABLET ORAL at 10:01

## 2024-01-23 NOTE — PT/OT/SLP PROGRESS
"Speech Language Pathology Treatment    Patient Name:  Loreto Goff   MRN:  [id number]  Admitting Diagnosis: Anterior communicating artery aneurysm    Recommendations:                 General Recommendations:  cog therapy  Diet recommendations:  Regular, Liquid Diet Level: Thin   Aspiration Precautions: Standard aspiration precautions   General Precautions: Standard, aspiration  Communication strategies:  none    Assessment:     Loreto Goff is a 58 y.o. female with an SLP diagnosis of Cognitive-Linguistic Impairment.  She presents with mild impairment/close to baseline.    Subjective     "My memory is not the best because of my seizures" per pt  Patient goals: home     Pain/Comfort:  Pain Rating 1: 3/10  Location 1: head  Pain Rating Post-Intervention 1: 3/10    Respiratory Status: Room air    Objective:     Has the patient been evaluated by SLP for swallowing?   Yes  Keep patient NPO? No   Current Respiratory Status:        Pt. Seen at bedside and was alert with good attention to task, despite head ache.  Ms. Goff was oriented to time and place with good recall of recent and remote events.  She verbalized good insight to deficits and situation.  She responded to functional math and time calculations with 100% accuracy and recalled 3/3 objects after 5 minute delay.  Ms. Moss responded to mental manipulation tasks with 100% accuracy with mild delays in responding likely due to head ache pain.  Ongoing education provided to pt. Re cognitive communicative deficits and rehab plan of care.  No further speech therapy recommended at this time.  Memory appears to be at baseline    Goals:   Multidisciplinary Problems       SLP Goals          Problem: SLP    Goal Priority Disciplines Outcome   SLP Goal     SLP    Description: Speech Language Pathology Goals  Updated goals expected to be met 1/24:   1. Following a delay, pt will recall 3/3 novel items with supervision.   2. Pt will complete math/money/time management tasks " with 80% accuracy given min cues.     Goals expected to be met by 1/19:  1. Pt will O x 4. Goal met  2. Pt will recall general information with 70% accuracy. Goal met  3. Following a delay, pt will recall 3/3 novel items with supervision. ongoing  4. Pt will answer complex yes/no q's with 80% accuracy given mod cues. Goal met  5. Pt will follow 2-step commands with 70% accuracy given max cues. Goal met  6. Pt will answer simple problem solving q's with 70% accuracy given min cues. Goal met                               Plan:     Patient to be seen:    Plan of Care expires:  02/11/24  Plan of Care reviewed with:  patient   SLP Follow-Up:  no       Discharge recommendations: No therapy indicated  Barriers to Discharge:  None    Time Tracking:     SLP Treatment Date:   01/23/24  Speech Start Time:  0800  Speech Stop Time:  0825     Speech Total Time (min):  25 min    Billable Minutes: Speech Therapy Individual 15 and Self Care/Home Management Training 10    01/23/2024

## 2024-01-23 NOTE — NURSING
Up in chair at bedside after walking with P.T. and reporting continuous supply of ice to moisten her wash cloth to apply to her head is the only this that helps reduce her constant headache pain.  Reports has not slept in the past 5 nights with today being the 6 th day and wants to rest.

## 2024-01-23 NOTE — NURSING
VINICIO Otto at chair side discussing plan of care and potential for discharging today and trying Fioracet.

## 2024-01-23 NOTE — SUBJECTIVE & OBJECTIVE
Interval History: 1/23: NAEO. PBD 18. Reports headache is improved this AM. Fioricet added this AM. Exam stable.     Medications:  Continuous Infusions:  Scheduled Meds:   acetaminophen  650 mg Oral Q6H    enoxparin  40 mg Subcutaneous Q24H (prophylaxis, 1700)    levothyroxine  25 mcg Oral Before breakfast    LIDOcaine  1 patch Transdermal Q24H    methocarbamoL  750 mg Oral QID    niMODipine  60 mg Oral Q4H    polyethylene glycol  17 g Oral BID    pregabalin  225 mg Oral BID    senna-docusate 8.6-50 mg  2 tablet Oral BID    sodium chloride  2,000 mg Oral TID     PRN Meds:bisacodyL, butalbital-acetaminophen-caffeine -40 mg, hydrALAZINE, labetalol, ondansetron, oxyCODONE, oxyCODONE     Review of Systems  Objective:     Weight: 97.1 kg (214 lb)  Body mass index is 33.52 kg/m².  Vital Signs (Most Recent):  Temp: 98.4 °F (36.9 °C) (01/23/24 1059)  Pulse: 70 (01/23/24 1059)  Resp: 18 (01/23/24 1059)  BP: (!) 111/54 (01/23/24 1059)  SpO2: (!) 93 % (01/23/24 1059) Vital Signs (24h Range):  Temp:  [97.6 °F (36.4 °C)-98.7 °F (37.1 °C)] 98.4 °F (36.9 °C)  Pulse:  [68-85] 70  Resp:  [16-18] 18  SpO2:  [92 %-98 %] 93 %  BP: (111-145)/(53-69) 111/54      Physical Exam  General: well developed, well nourished, no distress.   Head: normocephalic, atraumatic  Mental Status: Awake, Alert, Oriented  Speech: Clear with content appropriate to conversation  Cranial nerves: CN II-XII grossly intact.   Sensory: intact to light touch throughout     Motor Strength: Moves all extremities spontaneously with good tone.  Full strength upper and lower extremities. No abnormal movements seen.      Sitting up in her chair this morning     Significant Labs:  Recent Labs   Lab 01/22/24  0444 01/23/24  0429   * 121*    143   K 4.4 4.1    109   CO2 30* 24   BUN 16 13   CREATININE 0.9 0.7   CALCIUM 9.9 9.4     Recent Labs   Lab 01/22/24  0444 01/23/24  0429   WBC 9.54 6.42   HGB 10.2* 10.1*   HCT 32.2* 32.4*    376     No  "results for input(s): "LABPT", "INR", "APTT" in the last 48 hours.  Microbiology Results (last 7 days)       ** No results found for the last 168 hours. **          All pertinent labs from the last 24 hours have been reviewed.    Significant Diagnostics:  No new relevant imaging to discuss.   "

## 2024-01-23 NOTE — ASSESSMENT & PLAN NOTE
57 yo F with PMH of hypothyroidism who presents as transfer from [facility] due to thunderclap headache that started suddenly on Friday, 6 days ago. She reports her headache has worsened more and more over the last week so she came to the hospital. She denies any illicit drug use or blood thinners. She reports her headache is a 10 out of 10.     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   Per report there was xanthochromia on LP performed at OSH on 11/10/24.     Now s/p coil embolization of Acomm 1/11    - PBD 18  - Admitted to NPU with q4 neuro checks  - SAH protocol // vasospasm watch   - Na > 135   - nimotop x21 days  - SBP < 180   - strict I/O's; keep euvolemic to 1L net positive    Dispo: possibly home tomorrow

## 2024-01-23 NOTE — PROGRESS NOTES
Jani Grimaldo - Neurosurgery ([facility])  Neurosurgery  Progress Note    Subjective:     History of Present Illness: 57 yo F with PMH of hypothyroidism who presents as transfer from [facility] due to thunderclap headache that started suddenly on Friday, 6 days ago. She reports her headache has worsened more and more over the last week so she came to the hospital. She denies any illicit drug use or blood thinners. She reports her headache is a 10 out of 10. 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 Per report there was xanthochromia on LP performed at OSH on 11/10/24. NSGY consulted for aneurysm.     Post-Op Info:  * No surgery found *       Interval History: 1/23: NAEO. PBD 18. Reports headache is improved this AM. Fioricet added this AM. Exam stable.     Medications:  Continuous Infusions:  Scheduled Meds:   acetaminophen  650 mg Oral Q6H    enoxparin  40 mg Subcutaneous Q24H (prophylaxis, 1700)    levothyroxine  25 mcg Oral Before breakfast    LIDOcaine  1 patch Transdermal Q24H    methocarbamoL  750 mg Oral QID    niMODipine  60 mg Oral Q4H    polyethylene glycol  17 g Oral BID    pregabalin  225 mg Oral BID    senna-docusate 8.6-50 mg  2 tablet Oral BID    sodium chloride  2,000 mg Oral TID     PRN Meds:bisacodyL, butalbital-acetaminophen-caffeine -40 mg, hydrALAZINE, labetalol, ondansetron, oxyCODONE, oxyCODONE     Review of Systems  Objective:     Weight: 97.1 kg (214 lb)  Body mass index is 33.52 kg/m².  Vital Signs (Most Recent):  Temp: 98.4 °F (36.9 °C) (01/23/24 1059)  Pulse: 70 (01/23/24 1059)  Resp: 18 (01/23/24 1059)  BP: (!) 111/54 (01/23/24 1059)  SpO2: (!) 93 % (01/23/24 1059) Vital Signs (24h Range):  Temp:  [97.6 °F (36.4 °C)-98.7 °F (37.1 °C)] 98.4 °F (36.9 °C)  Pulse:  [68-85] 70  Resp:  [16-18] 18  SpO2:  [92 %-98 %] 93 %  BP: (111-145)/(53-69) 111/54      Physical Exam  General: well developed, well nourished, no distress.   Head:  "normocephalic, atraumatic  Mental Status: Awake, Alert, Oriented  Speech: Clear with content appropriate to conversation  Cranial nerves: CN II-XII grossly intact.   Sensory: intact to light touch throughout     Motor Strength: Moves all extremities spontaneously with good tone.  Full strength upper and lower extremities. No abnormal movements seen.      Sitting up in her chair this morning     Significant Labs:  Recent Labs   Lab 01/22/24  0444 01/23/24  0429   * 121*    143   K 4.4 4.1    109   CO2 30* 24   BUN 16 13   CREATININE 0.9 0.7   CALCIUM 9.9 9.4     Recent Labs   Lab 01/22/24  0444 01/23/24  0429   WBC 9.54 6.42   HGB 10.2* 10.1*   HCT 32.2* 32.4*    376     No results for input(s): "LABPT", "INR", "APTT" in the last 48 hours.  Microbiology Results (last 7 days)       ** No results found for the last 168 hours. **          All pertinent labs from the last 24 hours have been reviewed.    Significant Diagnostics:  No new relevant imaging to discuss.   Assessment/Plan:     * Anterior communicating artery aneurysm  57 yo F with PMH of hypothyroidism who presents as transfer from [facility] due to thunderclap headache that started suddenly on Friday, 6 days ago. She reports her headache has worsened more and more over the last week so she came to the hospital. She denies any illicit drug use or blood thinners. She reports her headache is a 10 out of 10.     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   Per report there was xanthochromia on LP performed at OSH on 11/10/24.     Now s/p coil embolization of Acomm 1/11    - PBD 18  - Admitted to NPU with q4 neuro checks  - SAH protocol // vasospasm watch   - Na > 135   - nimotop x21 days  - SBP < 180   - strict I/O's; keep euvolemic to 1L net positive    Dispo: possibly home tomorrow    Hyponatremia  --Resolved, will CTM    Acquired hypothyroidism  --Continue home " levothyroxine    Essential hypertension  --On Nimodipine per SAH protocol        Clarita Carey PA-C  Neurosurgery  Jani Grimaldo - Neurosurgery ([facility])

## 2024-01-23 NOTE — PLAN OF CARE
Problem: Skin Injury Risk Increased  Goal: Skin Health and Integrity  Outcome: Ongoing, Progressing  Intervention: Optimize Skin Protection  Flowsheets (Taken 1/22/2024 2010)  Head of Bed (HOB) Positioning:   HOB elevated   HOB lowered

## 2024-01-23 NOTE — PT/OT/SLP PROGRESS
"Physical Therapy Treatment    Patient Name:  Loreto Goff   MRN:  [id number]  Admitting Diagnosis:  Anterior communicating artery aneurysm   Recent Surgery: * No surgery found *    Admit Date: 1/10/2024  Length of Stay: 13 days    Recommendations:     Discharge Recommendations:  Low Intensity Therapy  Discharge Equipment Recommendations: none   Justification for Equipment: N/A  Barriers to discharge: Evolving Clinical Presentation    Assessment:     Loreto Goff is a 58 y.o. female admitted with a medical diagnosis of Anterior communicating artery aneurysm.  She presents with the following impairments/functional limitations:  gait instability, impaired balance, impaired endurance, impaired self care skills, impaired functional mobility, decreased safety awareness, pain.     Pt agreeable to therapy, very talkative, reports she has been getting up on her own despite being told not to. Pt ambulates in leslie with improved endurance and stability but still relatively unstable and a fall risk.     Rehab Prognosis: Good; patient would benefit from acute skilled PT services to address these deficits and reach maximum level of function.    Recent Surgery: * No surgery found *      Treatment Tolerated: Fairly Well    Highest level of mobility achieved this visit: ambulates 200ft w/ CGA and no A/D    Activity with RN/PCT: ambulate with 1 person assist    Plan:     During this hospitalization, patient to be seen 4 x/week to address the identified rehab impairments via gait training, therapeutic activities, therapeutic exercises, neuromuscular re-education and progress toward the following goals:    Plan of Care Expires:  02/13/24    Subjective     JOHANA Sanders notified prior to session. No family present upon PT entrance into room.    Chief Complaint: headache  Patient/Family Comments/goals: "My son is a  and fights MMA"  Pain/Comfort:  Pain Rating 1:  (12/10)  Location - Orientation 1: generalized  Location 1: head  Pain " "Addressed 1: Reposition, Distraction, Nurse notified      Objective:     Additional staff present: none    Patient found supine with:  (no active lines)   Cognition:   Alert and Cooperative  Command following: Follows one-step verbal commands  Fluency: clear/fluent  General Precautions: Standard, aspiration   Orthopedic Precautions:N/A   Braces: N/A   Body mass index is 33.52 kg/m².  Oxygen Device: Room Air    Vitals: /60 (BP Location: Left arm, Patient Position: Lying)   Pulse 68   Temp 97.6 °F (36.4 °C) (Oral)   Resp 16   Ht 5' 7" (1.702 m)   Wt 97.1 kg (214 lb)   SpO2 97%   BMI 33.52 kg/m²     Outcome Measures:  AM-PAC 6 CLICK MOBILITY  Turning over in bed (including adjusting bedclothes, sheets and blankets)?: 4  Sitting down on and standing up from a chair with arms (e.g., wheelchair, bedside commode, etc.): 4  Moving from lying on back to sitting on the side of the bed?: 4  Moving to and from a bed to a chair (including a wheelchair)?: 4  Need to walk in hospital room?: 3  Climbing 3-5 steps with a railing?: 3  Basic Mobility Total Score: 22     Functional Mobility:    Bed Mobility:   Supine to Sit: stand by assistance; from L side of bed  Scooting anteriorly to EOB to have both feet planted on floor: stand by assistance    Sitting Balance at Edge of Bed:  Assistance Level Required: Stand-by Assistance  Time: 3 min  Postural deviations noted: no deviations noted    Transfers:   Sit <> Stand Transfer: contact guard assistance with no assistive device   Stand <> Sit Transfer: contact guard assistance with no assistive device   x1 trials from EOB    Standing Balance:  Assistance Level Required: Stand-by Assistance  Patient used: no assistive device   Time: 20 min  Postural deviations noted: no deviations noted      Gait:  Patient ambulated: 200ft   Patient required: contact guard  Patient used:  no assistive device   Gait Pattern observed: swing-through gait  Gait Deviation(s): occasional unsteady " gait, decreased step length, and decreased karson  Impairments due to: impaired balance  Gait belt utilized  Comments: pt easily distracted, stops to talk a few times    Therapeutic Exercises:   Static and dynamic sitting balance at EOB  Static and dynamic standing balance at EOB  Standing tolerance  Balance during gait    Education:  Time provided for education, counseling and discussion of health disposition in regards to patient's current status  All questions answered within PT scope of practice and to patient's satisfaction  PT role in POC to address current functional deficits  Pt educated on proper body mechanics, safety techniques, and energy conservation with PT facilitation and cueing throughout session    Patient left up in chair with call button in reach and RN Tommy present.    GOALS:   Multidisciplinary Problems       Physical Therapy Goals          Problem: Physical Therapy    Goal Priority Disciplines Outcome Goal Variances Interventions   Physical Therapy Goal     PT, PT/OT Ongoing, Progressing     Description: Goals to be met by: 24     Patient will increase functional independence with mobility by performin. Supine to sit with Modified [place]  2. Sit to supine with Modified [place]  3. Sit to stand transfer with Modified [place]  4. Bed to chair transfer with Stand-by Assistance using LRAD as needed  5. Gait  x 150 feet with Stand-by Assistance using LRAD as needed.   6. Ascend/descend 2 stair with no Handrails and Stand-by Assistance  7. Lower extremity exercise program x15 reps per handout, with assistance as needed                       Time Tracking:     PT Received On: 24  PT Start Time: 855     PT Stop Time: 933  PT Total Time (min): 38 min     Billable Minutes:   Gait Training 18 min, Therapeutic Activity 12 min, and Neuromuscular Re-education 10 min    Treatment Type: Treatment  PT/PTA: PT       Hayden Ramirez, PT, DPT  2024

## 2024-01-24 ENCOUNTER — TELEPHONE (OUTPATIENT)
Dept: NEUROSURGERY | Facility: CLINIC | Age: 59
End: 2024-01-24
Payer: COMMERCIAL

## 2024-01-24 VITALS
WEIGHT: 214 LBS | HEART RATE: 76 BPM | HEIGHT: 67 IN | DIASTOLIC BLOOD PRESSURE: 58 MMHG | RESPIRATION RATE: 18 BRPM | TEMPERATURE: 96 F | OXYGEN SATURATION: 95 % | SYSTOLIC BLOOD PRESSURE: 125 MMHG | BODY MASS INDEX: 33.59 KG/M2

## 2024-01-24 DIAGNOSIS — I67.1 CEREBRAL ANEURYSM, NONRUPTURED: Primary | ICD-10-CM

## 2024-01-24 PROBLEM — I61.5 INTRAVENTRICULAR HEMORRHAGE: Status: ACTIVE | Noted: 2024-01-24

## 2024-01-24 PROBLEM — E87.6 HYPOKALEMIA: Status: ACTIVE | Noted: 2024-01-24

## 2024-01-24 PROBLEM — I60.9 SAH (SUBARACHNOID HEMORRHAGE): Status: ACTIVE | Noted: 2024-01-24

## 2024-01-24 PROBLEM — R11.0 NAUSEA: Status: ACTIVE | Noted: 2024-01-24

## 2024-01-24 PROBLEM — R52 PAIN: Status: ACTIVE | Noted: 2024-01-24

## 2024-01-24 PROBLEM — D62 ACUTE BLOOD LOSS ANEMIA: Status: ACTIVE | Noted: 2024-01-24

## 2024-01-24 PROBLEM — K59.00 CONSTIPATION: Status: ACTIVE | Noted: 2024-01-24

## 2024-01-24 PROBLEM — G93.6 VASOGENIC CEREBRAL EDEMA: Status: ACTIVE | Noted: 2024-01-24

## 2024-01-24 PROBLEM — G45.9 TRANSIENT ISCHEMIC ATTACK: Status: ACTIVE | Noted: 2024-01-24

## 2024-01-24 LAB
ALBUMIN SERPL BCP-MCNC: 3.2 G/DL (ref 3.5–5.2)
ALP SERPL-CCNC: 102 U/L (ref 55–135)
ALT SERPL W/O P-5'-P-CCNC: 44 U/L (ref 10–44)
ANION GAP SERPL CALC-SCNC: 11 MMOL/L (ref 8–16)
AST SERPL-CCNC: 24 U/L (ref 10–40)
BASOPHILS # BLD AUTO: 0.05 K/UL (ref 0–0.2)
BASOPHILS NFR BLD: 0.7 % (ref 0–1.9)
BILIRUB SERPL-MCNC: 0.2 MG/DL (ref 0.1–1)
BUN SERPL-MCNC: 11 MG/DL (ref 6–20)
CALCIUM SERPL-MCNC: 10 MG/DL (ref 8.7–10.5)
CHLORIDE SERPL-SCNC: 104 MMOL/L (ref 95–110)
CO2 SERPL-SCNC: 25 MMOL/L (ref 23–29)
CREAT SERPL-MCNC: 0.8 MG/DL (ref 0.5–1.4)
DIFFERENTIAL METHOD BLD: ABNORMAL
EOSINOPHIL # BLD AUTO: 0.3 K/UL (ref 0–0.5)
EOSINOPHIL NFR BLD: 4.1 % (ref 0–8)
ERYTHROCYTE [DISTWIDTH] IN BLOOD BY AUTOMATED COUNT: 13.4 % (ref 11.5–14.5)
EST. GFR  (NO RACE VARIABLE): >60 ML/MIN/1.73 M^2
GLUCOSE SERPL-MCNC: 143 MG/DL (ref 70–110)
HCT VFR BLD AUTO: 34.3 % (ref 37–48.5)
HGB BLD-MCNC: 10.8 G/DL (ref 12–16)
IMM GRANULOCYTES # BLD AUTO: 0.02 K/UL (ref 0–0.04)
IMM GRANULOCYTES NFR BLD AUTO: 0.3 % (ref 0–0.5)
LYMPHOCYTES # BLD AUTO: 1.9 K/UL (ref 1–4.8)
LYMPHOCYTES NFR BLD: 24.9 % (ref 18–48)
MCH RBC QN AUTO: 28.3 PG (ref 27–31)
MCHC RBC AUTO-ENTMCNC: 31.5 G/DL (ref 32–36)
MCV RBC AUTO: 90 FL (ref 82–98)
MONOCYTES # BLD AUTO: 0.6 K/UL (ref 0.3–1)
MONOCYTES NFR BLD: 8.1 % (ref 4–15)
NEUTROPHILS # BLD AUTO: 4.7 K/UL (ref 1.8–7.7)
NEUTROPHILS NFR BLD: 61.9 % (ref 38–73)
NRBC BLD-RTO: 0 /100 WBC
PHOSPHATE SERPL-MCNC: 3.8 MG/DL (ref 2.7–4.5)
PLATELET # BLD AUTO: 427 K/UL (ref 150–450)
PMV BLD AUTO: 10.9 FL (ref 9.2–12.9)
POTASSIUM SERPL-SCNC: 3.9 MMOL/L (ref 3.5–5.1)
PROT SERPL-MCNC: 7.4 G/DL (ref 6–8.4)
RBC # BLD AUTO: 3.81 M/UL (ref 4–5.4)
SODIUM SERPL-SCNC: 140 MMOL/L (ref 136–145)
WBC # BLD AUTO: 7.52 K/UL (ref 3.9–12.7)

## 2024-01-24 PROCEDURE — 25000003 PHARM REV CODE 250

## 2024-01-24 PROCEDURE — 85025 COMPLETE CBC W/AUTO DIFF WBC: CPT

## 2024-01-24 PROCEDURE — 25000003 PHARM REV CODE 250: Performed by: NURSE PRACTITIONER

## 2024-01-24 PROCEDURE — 25000003 PHARM REV CODE 250: Performed by: STUDENT IN AN ORGANIZED HEALTH CARE EDUCATION/TRAINING PROGRAM

## 2024-01-24 PROCEDURE — 36415 COLL VENOUS BLD VENIPUNCTURE: CPT

## 2024-01-24 PROCEDURE — 25000003 PHARM REV CODE 250: Performed by: PSYCHIATRY & NEUROLOGY

## 2024-01-24 PROCEDURE — 99238 HOSP IP/OBS DSCHRG MGMT 30/<: CPT | Mod: ,,, | Performed by: NEUROLOGICAL SURGERY

## 2024-01-24 PROCEDURE — 97116 GAIT TRAINING THERAPY: CPT | Mod: CQ

## 2024-01-24 PROCEDURE — 84100 ASSAY OF PHOSPHORUS: CPT

## 2024-01-24 PROCEDURE — 80053 COMPREHEN METABOLIC PANEL: CPT

## 2024-01-24 RX ORDER — ACETAMINOPHEN 325 MG/1
650 TABLET ORAL EVERY 6 HOURS
Qty: 240 TABLET | Refills: 0 | Status: SHIPPED | OUTPATIENT
Start: 2024-01-24 | End: 2024-02-23

## 2024-01-24 RX ORDER — LEVOTHYROXINE SODIUM 25 UG/1
25 TABLET ORAL
Qty: 30 TABLET | Refills: 11 | Status: SHIPPED | OUTPATIENT
Start: 2024-01-25 | End: 2025-01-24

## 2024-01-24 RX ORDER — POLYETHYLENE GLYCOL 3350 17 G/17G
17 POWDER, FOR SOLUTION ORAL 2 TIMES DAILY
Qty: 510 G | Refills: 0 | Status: SHIPPED | OUTPATIENT
Start: 2024-01-24 | End: 2024-02-23

## 2024-01-24 RX ORDER — OXYCODONE HYDROCHLORIDE 5 MG/1
5 TABLET ORAL EVERY 6 HOURS PRN
Qty: 28 TABLET | Refills: 0 | Status: SHIPPED | OUTPATIENT
Start: 2024-01-24 | End: 2024-01-31

## 2024-01-24 RX ORDER — METHOCARBAMOL 750 MG/1
750 TABLET, FILM COATED ORAL 4 TIMES DAILY PRN
Qty: 40 TABLET | Refills: 0 | Status: SHIPPED | OUTPATIENT
Start: 2024-01-24 | End: 2024-02-03

## 2024-01-24 RX ORDER — AMOXICILLIN 250 MG
2 CAPSULE ORAL 2 TIMES DAILY
Qty: 14 TABLET | Refills: 0 | Status: SHIPPED | OUTPATIENT
Start: 2024-01-24

## 2024-01-24 RX ORDER — PREGABALIN 225 MG/1
225 CAPSULE ORAL 2 TIMES DAILY
Qty: 60 CAPSULE | Refills: 0 | Status: SHIPPED | OUTPATIENT
Start: 2024-01-24 | End: 2024-02-23

## 2024-01-24 RX ORDER — BUTALBITAL, ACETAMINOPHEN AND CAFFEINE 50; 325; 40 MG/1; MG/1; MG/1
1 TABLET ORAL DAILY PRN
Qty: 30 TABLET | Refills: 0 | Status: SHIPPED | OUTPATIENT
Start: 2024-01-24 | End: 2024-02-23

## 2024-01-24 RX ORDER — BUTALBITAL, ACETAMINOPHEN AND CAFFEINE 50; 325; 40 MG/1; MG/1; MG/1
1 TABLET ORAL EVERY 6 HOURS PRN
Status: DISCONTINUED | OUTPATIENT
Start: 2024-01-24 | End: 2024-01-24 | Stop reason: HOSPADM

## 2024-01-24 RX ORDER — NIMODIPINE 30 MG/1
60 CAPSULE, LIQUID FILLED ORAL EVERY 4 HOURS
Qty: 36 CAPSULE | Refills: 0 | Status: SHIPPED | OUTPATIENT
Start: 2024-01-24 | End: 2024-01-27

## 2024-01-24 RX ADMIN — METHOCARBAMOL TABLETS 750 MG: 750 TABLET, COATED ORAL at 02:01

## 2024-01-24 RX ADMIN — ACETAMINOPHEN 650 MG: 325 TABLET ORAL at 03:01

## 2024-01-24 RX ADMIN — METHOCARBAMOL TABLETS 750 MG: 750 TABLET, COATED ORAL at 09:01

## 2024-01-24 RX ADMIN — NIMODIPINE 60 MG: 30 CAPSULE, LIQUID FILLED ORAL at 08:01

## 2024-01-24 RX ADMIN — OXYCODONE HYDROCHLORIDE 10 MG: 10 TABLET ORAL at 09:01

## 2024-01-24 RX ADMIN — NIMODIPINE 60 MG: 30 CAPSULE, LIQUID FILLED ORAL at 02:01

## 2024-01-24 RX ADMIN — SENNOSIDES AND DOCUSATE SODIUM 2 TABLET: 50; 8.6 TABLET ORAL at 09:01

## 2024-01-24 RX ADMIN — NIMODIPINE 60 MG: 30 CAPSULE, LIQUID FILLED ORAL at 03:01

## 2024-01-24 RX ADMIN — SODIUM CHLORIDE 2000 MG: 1 TABLET ORAL at 09:01

## 2024-01-24 RX ADMIN — LEVOTHYROXINE SODIUM 25 MCG: 25 TABLET ORAL at 08:01

## 2024-01-24 RX ADMIN — OXYCODONE HYDROCHLORIDE 10 MG: 10 TABLET ORAL at 03:01

## 2024-01-24 RX ADMIN — SODIUM CHLORIDE 2000 MG: 1 TABLET ORAL at 02:01

## 2024-01-24 RX ADMIN — ACETAMINOPHEN 650 MG: 325 TABLET ORAL at 08:01

## 2024-01-24 RX ADMIN — BUTALBITAL, ACETAMINOPHEN, AND CAFFEINE 1 TABLET: 50; 325; 40 TABLET ORAL at 09:01

## 2024-01-24 RX ADMIN — PREGABALIN 225 MG: 150 CAPSULE ORAL at 09:01

## 2024-01-24 RX ADMIN — NIMODIPINE 60 MG: 30 CAPSULE, LIQUID FILLED ORAL at 11:01

## 2024-01-24 NOTE — NURSING
Message to CM:  Patient reports lives 2[place]5 hours away and will need assistance with transportation home at discharge today

## 2024-01-24 NOTE — PLAN OF CARE
Problem: Pain Acute  Goal: Acceptable Pain Control and Functional Ability  Outcome: Ongoing, Progressing     Problem: Fall Injury Risk  Goal: Absence of Fall and Fall-Related Injury  Outcome: Ongoing, Progressing  Patient started on Fioricet decreased pain noted

## 2024-01-24 NOTE — DISCHARGE SUMMARY
Jani Grimaldo - Neurosurgery ([facility])  Neurosurgery  Discharge Summary      Patient Name: Loreto Goff  MRN: [id number]  Admission Date: 1/10/2024  Hospital Length of Stay: 14 days  Discharge Date and Time:  01/24/2024 2:27 PM  Attending Physician: Lebron Crump MD   Discharging Provider: Clarita Carey PA-C  Primary Care Provider: Hallie, Provider    HPI:   59 yo F with PMH of hypothyroidism who presents as transfer from [facility] due to thunderclap headache that started suddenly on Friday, 6 days ago. She reports her headache has worsened more and more over the last week so she came to the hospital. She denies any illicit drug use or blood thinners. She reports her headache is a 10 out of 10. 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 Per report there was xanthochromia on LP performed at OSH on 11/10/24. NSGY consulted for aneurysm.     * No surgery found *     Hospital Course:   Patient was admitted to Neuro ICU for close neurological monitoring. She underwent angiogram with coil embolization on 1/11/24 which she tolerated without complication. She was started on SAH protocol including a 21 day course of Nimodipine. Post op head CT demonstrated resolving SAH. She underwent TCDs which did not suggest vasospams. Throughout her hospital course she complained of persistent headache which was improved with oral analgesics. She was downgraded out of the ICU and transferred to NPU for ongoing care. On post bleed day 19 her headache was significantly improved. She was cleared by PT and OT for return to home upon discharge.     At time of discharge on 1/24/24 the patient was neurologically stable, was afebrile, and vital signs were stable. She was tolerating a diet and voiding without difficulty. Discharge instructions were verbally discussed with the patient, including wound care and follow up appointments. The patient was also given a discharge instruction sheet  explaining the aforementioned discussion. The patient verbalized understanding of instructions and agreed to the plan. She has follow up appointments with Dr. Tong. However, [facility] is out of network with her insurance so she will need a referral for ongoing care beyond the post op window.        Goals of Care Treatment Preferences:  Code Status: Full Code      Consults:   Consults (From admission, onward)          Status Ordering Provider     Inpatient consult to Interventional Radiology  Once        Provider:  (Not yet assigned)    Completed NICPATRIA, MELL S.     Inpatient consult to Neurosurgery  Once        Provider:  (Not yet assigned)    Completed NICAUD, MELL S.     Inpatient consult to Vascular (Stroke) Neurology  Once        Provider:  (Not yet assigned)    Completed NICPATRIA, MELL S.     Inpatient consult to Physical Medicine Rehab  Once        Provider:  (Not yet assigned)    Completed NICPATRIA, MELL S.     Inpatient consult to Registered Dietitian/Nutritionist  Once        Provider:  (Not yet assigned)    Completed NICPATRIA, MELL S.     IP consult to case management/social work  Once        Provider:  (Not yet assigned)    Completed MELL CHICAS.          Physical Exam:   General: well developed, well nourished, no distress.   Head: normocephalic, atraumatic  Mental Status: Awake, Alert, Oriented  Speech: Clear with content appropriate to conversation  Cranial nerves: CN II-XII grossly intact.   Sensory: intact to light touch throughout     Motor Strength: Moves all extremities spontaneously with good tone.  Full strength upper and lower extremities. No abnormal movements seen.      Observed ambulating throughout her room this AM    Significant Diagnostic Studies: N/A    Pending Diagnostic Studies:       Procedure Component Value Units Date/Time    Freeze and Hold, [facility] [[id number]] Collected: 01/10/24 1500    Order Status: Sent Lab Status: No result     Specimen: CSF (Spinal Fluid) from  Cerebrospinal Fluid     US Transcran Doppler Intracran Artr Comp [[id number]]     Order Status: Sent Lab Status: No result           Final Active Diagnoses:    Diagnosis Date Noted POA    PRINCIPAL PROBLEM:  Anterior communicating artery aneurysm [I67.1] 01/10/2024 Yes    Hyponatremia [E87.1] 01/15/2024 No    Thunderclap headache [G44.53] 01/10/2024 Yes    Essential hypertension [I10] 01/10/2024 Yes    Acquired hypothyroidism [E03.9] 01/10/2024 Yes    Mild intermittent asthma without complication [J45.20] 01/10/2024 Yes    Recurrent major depression in partial remission [F33.41] 01/10/2024 Yes      Problems Resolved During this Admission:    Diagnosis Date Noted Date Resolved POA    Urinary retention [R33.9] 01/13/2024 01/16/2024 No      Discharged Condition: good     Disposition: Home or Self Care    Follow Up:    Patient Instructions:      Notify your health care provider if you experience any of the following:  temperature >100.4     Notify your health care provider if you experience any of the following:  persistent nausea and vomiting or diarrhea     Notify your health care provider if you experience any of the following:  severe uncontrolled pain     Notify your health care provider if you experience any of the following:  redness, tenderness, or signs of infection (pain, swelling, redness, odor or green/yellow discharge around incision site)     Notify your health care provider if you experience any of the following:  difficulty breathing or increased cough     Notify your health care provider if you experience any of the following:  severe persistent headache     Notify your health care provider if you experience any of the following:  worsening rash     Notify your health care provider if you experience any of the following:  persistent dizziness, light-headedness, or visual disturbances     Notify your health care provider if you experience any of the following:  increased confusion or weakness     Notify  your health care provider if you experience any of the following:     Activity as tolerated     Medications:  Reconciled Home Medications:      Medication List        START taking these medications      acetaminophen 325 MG tablet  Commonly known as: TYLENOL  Take 2 tablets (650 mg total) by mouth every 6 (six) hours.     butalbital-acetaminophen-caffeine -40 mg -40 mg per tablet  Commonly known as: FIORICET, ESGIC  Take 1 tablet by mouth daily as needed for Headaches.     GAVILAX 17 gram/dose powder  Generic drug: polyethylene glycol  Use cap to measure 17 g, mix with liquid, and take by mouth 2 (two) times daily.     levothyroxine 25 MCG tablet  Commonly known as: SYNTHROID  Take 1 tablet (25 mcg total) by mouth before breakfast.  Start taking on: January 25, 2024     methocarbamoL 750 MG Tab  Commonly known as: ROBAXIN  Take 1 tablet (750 mg total) by mouth 4 (four) times daily as needed (For pain).     niMODipine 30 MG Cap  Commonly known as: NIMOTOP  Take 2 capsules (60 mg total) by mouth every 4 (four) hours. for 3 days     oxyCODONE 5 MG immediate release tablet  Commonly known as: ROXICODONE  Take 1 tablet (5 mg total) by mouth every 6 (six) hours as needed for Pain.     pregabalin 225 MG Cap  Commonly known as: LYRICA  Take 1 capsule (225 mg total) by mouth 2 (two) times daily.     STOOL SOFTENER-LAXATIVE 8.6-50 mg per tablet  Generic drug: senna-docusate 8.6-50 mg  Take 2 tablets by mouth 2 (two) times daily.              Clarita Carey PA-C  Neurosurgery  [facility] Neurosurgery [facility])

## 2024-01-24 NOTE — NURSING
"Patient sitting up with legs dangling over side of bed; reports pain as 0 out of 10 "with ice" in wash cloth on her head.    "

## 2024-01-24 NOTE — CARE UPDATE
I have reviewed the chart of Loreto Goff and participated in the care of the patient who is hospitalized for the following:    Active Hospital Problems    Diagnosis    *Anterior communicating artery aneurysm    Constipation     Liekly due to pain medication  Stool softeners as needed      Nausea     Due to SAH  Prn antinausea as needed      Pain     back pain and neck pain.   Multimodal pain management      SAH (subarachnoid hemorrhage)     Due to acomm aneurysm   Recent coil embolization of the anterior communicating artery.         Intraventricular hemorrhage     Due to acomm aneurysm   Recent coil embolization of the anterior communicating artery.       Vasogenic cerebral edema     Due to SAH and IVH  Monitored with CTH heads  Had a day of word finding difficulty and confusion, repeat cth was stable        Transient ischemic attack     Period of Transient neurological symptoms of dysarthria, confusion and word finding difficulty  Repeat cths stable  Has resolved      Acute blood loss anemia     Due to SAH and IVH from ruptured accom aneurysm now s/p coil   Monitored with cbc  Tranfuse <7      Hypokalemia     Noted on labs  Replaced as needed per primary  Monitor with daily labs  Now resolved      Hyponatremia    Thunderclap headache    Essential hypertension    Acquired hypothyroidism    Mild intermittent asthma without complication    Recurrent major depression in partial remission          I have reviewed the Loreto Goff with the multidisciplinary team during rounds.      Mildred Corado NP  Unit Based [facility]

## 2024-01-24 NOTE — PT/OT/SLP PROGRESS
"Physical Therapy Treatment    Patient Name:  Loreto Goff   MRN:  [id number]    Recommendations:     Discharge Recommendations: Low Intensity Therapy  Discharge Equipment Recommendations: none  Barriers to discharge: None    Assessment:     Loreto Goff is a 58 y.o. female admitted with a medical diagnosis of Anterior communicating artery aneurysm.  She presents with the following impairments/functional limitations: weakness, impaired self care skills, impaired functional mobility, decreased safety awareness, impaired cardiopulmonary response to activity, impaired endurance Pt tolerated treatment session well today. Pt was able to tolerate an increase to distance ambulated. Pt at times not wanting assistance during t/fs and gait training. PTA/PT conference with Agata Mckeon in regard to lowering POC from 4 times a week to 2 times a week. Patient can still benefit from continued skilled services within the acute environment and goals remain appropriate.   .    Rehab Prognosis: Good; patient would benefit from acute skilled PT services to address these deficits and reach maximum level of function.    Recent Surgery: * No surgery found *      Plan:     During this hospitalization, patient to be seen 2 x/week to address the identified rehab impairments via gait training, therapeutic activities, therapeutic exercises, neuromuscular re-education and progress toward the following goals:    Plan of Care Expires:  02/13/24    Subjective     Chief Complaint: None stated   Patient/Family Comments/goals: "I need a cold rag for my head."  Pain/Comfort:  Pain Rating 1:  (No pain when cold ice towel on head)  Pain Addressed 1: Pre-medicate for activity, Reposition, Distraction  Pain Rating Post-Intervention 1: 0/10      Objective:     Communicated with RN prior to session.  Patient found left sidelying with telemetry upon PT entry to room.     General Precautions: Standard, aspiration  Orthopedic Precautions: N/A  Braces: " N/A  Respiratory Status: Room air     Functional Mobility:  Bed Mobility:     Supine to Sit: stand by assistance  Transfers:     Sit to Stand:  stand by assistance with no AD  Gait: 10 ft to bathroom + 500 ft CGA/SBA with no AD   Pt requesting to use bathroom, unable to see toilet T/f due to pt insisting the door be closed. Cued to use grab bar to assist with sitting down and alert PTA once ready to get up      AM-PAC 6 CLICK MOBILITY  Turning over in bed (including adjusting bedclothes, sheets and blankets)?: 4  Sitting down on and standing up from a chair with arms (e.g., wheelchair, bedside commode, etc.): 4  Moving from lying on back to sitting on the side of the bed?: 4  Moving to and from a bed to a chair (including a wheelchair)?: 4  Need to walk in hospital room?: 3  Climbing 3-5 steps with a railing?: 3  Basic Mobility Total Score: 22       Treatment & Education:  Therapist provided instruction and educated for safety during transfers and gait training. As well as proper body mechanics, energy conservation, and fall prevention strategies during tasks listed above, and the effects of prolonged immobility and the importance of performing EOB/OOB activity and exercises to promote healing and reduce recovery time.       Patient left sitting edge of bed with all lines intact, call button in reach, RN notified, and RN present..    GOALS:   Multidisciplinary Problems       Physical Therapy Goals          Problem: Physical Therapy    Goal Priority Disciplines Outcome Goal Variances Interventions   Physical Therapy Goal     PT, PT/OT Ongoing, Progressing     Description: Goals to be met by: 24     Patient will increase functional independence with mobility by performin. Supine to sit with Modified [place]  2. Sit to supine with Modified [place]  3. Sit to stand transfer with Modified [place]  4. Bed to chair transfer with Stand-by Assistance using LRAD as needed  5. Gait  x 150 feet with  Stand-by Assistance using LRAD as needed.   6. Ascend/descend 2 stair with no Handrails and Stand-by Assistance  7. Lower extremity exercise program x15 reps per handout, with assistance as needed                         Time Tracking:     PT Received On: 01/24/24  PT Start Time: 1045     PT Stop Time: 1114  PT Total Time (min): 29 min     Billable Minutes: Gait Training 29    Treatment Type: Treatment  PT/PTA: PTA     Number of PTA visits since last PT visit: 1 01/24/2024

## 2024-01-24 NOTE — NURSING
Message to PA:  Patient is stating that Neurology group is suppose to return to her room to discuss her discharge before she can be discharged.

## 2024-01-24 NOTE — PLAN OF CARE
[facility] - Neurosurgery (Hospital)  Discharge Final Note    Primary Care Provider: Rosa Sterling    Expected Discharge Date: 1/24/2024    Patient to be discharged home.  CM to provide LYFT ride home.  Neurosurgery clinic to schedule follow up appointment.    Future Appointments   Date Time Provider Department Center   3/5/2024  3:30 PM [facility] OIC-MRI4 [facility] MRI [facility] Imaging [facility]   3/5/2024  4:00 PM [facility] OI-MRI1 [facility] MRI [facility]   3/11/2024 10:30 AM Lebron Crump MD [facility] NEUROS8 [facility]        Final Discharge Note (most recent)       Final Note - 01/24/24 [facility]          Final Note    Assessment Type Final Discharge Note     Anticipated Discharge Disposition Home or Self Care        Post-Acute Status    Post-Acute Authorization Other     Other Status No Post-Acute Service Needs     Discharge Delays None known at this time                     Important Message from Medicare

## 2024-01-24 NOTE — TELEPHONE ENCOUNTER
----- Message from Clarita Carey PA-C sent at 1/24/2024 12:50 PM CST -----  Hello!     Could you please assist with scheduling a follow up visit with Dr. Tong in 4-6 weeks? She will need an MRI and MRA brain with and without contrast prior to the visit.     Thanks so much!  -Clarita

## 2024-01-24 NOTE — NURSING
Response to CM:  Yes; medications have been delivered to bedside and pharmacist is currently at bedside.

## 2024-01-30 NOTE — PHYSICIAN QUERY
PT Name: Loreto Goff  MR #: [id number]     DOCUMENTATION CLARIFICATION      CDS/: Nora Munoz RN            Contact information: [email or web address]    This form is a permanent document in the medical record.     Query Date: January 30, 2024    Dear Provider,  By submitting this query, we are merely seeking further clarification of documentation.  Please utilize your independent clinical judgment when addressing the question(s) below.     The Medical Record contains the following:    Supporting Clinical Findings Location in Medical Record     Active Hospital Problems     Vasogenic cerebral edema        Due to SAH and IVH  Monitored with CTH heads  Had a day of word finding difficulty and confusion, repeat cth was stable     Anterior communicating artery aneurysm   SAH (subarachnoid hemorrhage)        Due to acomm aneurysm   Recent coil embolization of the anterior communicating artery.        Progress Note (Mildred Corado NP) HM                     Hospital Course:   Patient was admitted to Neuro ICU for close neurological monitoring. She underwent angiogram with coil embolization on 1/11/24 which she tolerated without complication. She was started on SAH protocol including a 21 day course of Nimodipine. Post op head CT demonstrated resolving SAH. She underwent TCDs which did not suggest vasospams. Throughout her hospital course she complained of persistent headache which was improved with oral analgesics. She was downgraded out of the ICU and transferred to NPU for ongoing care. On post bleed day 19 her headache was significantly improved. She was cleared by PT and OT for return to home upon discharge.      At time of discharge on 1/24/24 the patient was neurologically stable, was afebrile, and vital signs were stable. She was tolerating a diet and voiding without difficulty. Discharge instructions were verbally discussed with the patient, including wound care and follow up appointments. The patient  was also given a discharge instruction sheet explaining the aforementioned discussion. The patient verbalized understanding of instructions and agreed to the plan. She has follow up appointments with Dr. Tong. However, [facility] is out of network with her insurance so she will need a referral for ongoing care beyond the post op window.      FINDINGS:  The internal carotid arteries are normal caliber.     Right vertebral artery normal in caliber.  There is a 2 mm superiorly projected outpouching in origin of the posteroinferior cerebellar artery origin from the vertebral artery.  Configuration favoring small aneurysm rather than focal tortuosity of the origin (see 3D reconstruction).  This area is beyond the region of acquisition on vessel wall series     Basilar artery normal in caliber.     The proximal MCAs and PCAs appear within normal limits.     Focal susceptibility artifact at the level of the anterior communicating artery corresponding to the recent coil embolization.  No residual flow related signal within the lumen of the treated aneurysm.  Please note, assessment for residual filling mildly confounded by the lack of routine postcontrast MRA (post coiling protocol).  Circumferential wall enhancement at this site on the vessel wall imaging portion of the study, nonspecific in the post treatment setting.     The anterior cerebral arteries are otherwise normal in caliber.     No abnormal vessel wall enhancement elsewhere.     Impression:     Postsurgical change from recent anterior communicating artery aneurysm coiling without compelling evidence of residual aneurysm filling as discussed above.     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 D/C Summary (Aurelio) NS 1/24                          MRI Brain 1/19             Please clarify if the ____Vasogenic cerebral edema__________ diagnosis has been:    [ x ] Ruled In   [  ] Ruled In, Now Resolved   [  ] Ruled Out   [  ] Still to be ruled  out at the time of discharge   [  ] Other/Clarification of findings (please specify): _______________    [   ] Clinically undetermined         Please document in your progress notes daily for the duration of treatment, until resolved, and include in your discharge summary.    Form No. [id number]

## 2024-01-30 NOTE — PHYSICIAN QUERY
PT Name: Loreto Goff  MR #: [id number]     DOCUMENTATION CLARIFICATION      CDS/: Nora Munoz RN          Contact information: [email or web address]    This form is a permanent document in the medical record.     Query Date: January 30, 2024    Dear Provider,  By submitting this query, we are merely seeking further clarification of documentation.  Please utilize your independent clinical judgment when addressing the question(s) below.     The Medical Record contains the following:    Supporting Clinical Findings Location in Medical Record   Acute blood loss anemia        Due to SAH and IVH from ruptured accom aneurysm now s/p coil   Monitored with cbc  Tranfuse <7       Care Update (Mildred) 1/24 HM   Pre Op Diagnosis: A-comm aneurysm     Post Op Diagnosis: Same     Procedure: Diagnostic cerebral angiogram    Estimated Blood Loss: Minimal    Procedure 1/11   2247  WBC 17.79*  HGB 14.7  HCT 43.7       0429  WBC 9.54 6.42  HGB 10.2* 10.1*  HCT 32.2* 32.4*   376    Labs 1/11          Labs 1/23         Please clarify if the ___Acute blood loss anemia____ diagnosis has been:    [  ] Ruled In   [  ] Ruled In, Now Resolved   [  ] Ruled Out   [  ] Still to be ruled out at the time of discharge   [  ] Other/Clarification of findings (please specify): _______________    [   ] Clinically undetermined         Please document in your progress notes daily for the duration of treatment, until resolved, and include in your discharge summary.    Form No. [id number]

## 2024-01-31 NOTE — PHYSICIAN QUERY
PT Name: Loreto Goff  MR #: [id number]     DOCUMENTATION CLARIFICATION      CDS/: Nora Munoz RN          Contact information: [email or web address]    This form is a permanent document in the medical record.     Query Date: January 31, 2024    Dear Provider,  By submitting this query, we are merely seeking further clarification of documentation.  Please utilize your independent clinical judgment when addressing the question(s) below.     The Medical Record contains the following:    Supporting Clinical Findings Location in Medical Record   Acute blood loss anemia        Due to SAH and IVH from ruptured accom aneurysm now s/p coil   Monitored with cbc  Tranfuse <7       Care Update (Mildred) 1/24    Pre Op Diagnosis: A-comm aneurysm     Post Op Diagnosis: Same     Procedure: Diagnostic cerebral angiogram    Estimated Blood Loss: Minimal    Procedure 1/11   2247  WBC 17.79*  HGB 14.7  HCT 43.7       0429  WBC 9.54 6.42  HGB 10.2* 10.1*  HCT 32.2* 32.4*   376    Labs 1/11          Labs 1/23         Please clarify if the ___Acute blood loss anemia____ diagnosis has been:    [ X ] Ruled In   [  ] Ruled In, Now Resolved   [  ] Ruled Out   [  ] Still to be ruled out at the time of discharge   [  ] Other/Clarification of findings (please specify): _______________    [   ] Clinically undetermined         Please document in your progress notes daily for the duration of treatment, until resolved, and include in your discharge summary.    Form No. [id number]

## 2024-02-14 ENCOUNTER — TELEPHONE (OUTPATIENT)
Dept: NEUROLOGY | Facility: HOSPITAL | Age: 59
End: 2024-02-14
Payer: COMMERCIAL

## 2024-02-14 ENCOUNTER — PATIENT MESSAGE (OUTPATIENT)
Dept: NEUROLOGY | Facility: HOSPITAL | Age: 59
End: 2024-02-14
Payer: COMMERCIAL

## 2024-02-14 NOTE — TELEPHONE ENCOUNTER
RN reached out to case management for information on how patient may set up Lyft for attending appointments. RN was instructed that patient may contact her insurance directly as this would need to be set up through them after discharge. RN will let patient know.

## 2024-02-14 NOTE — TELEPHONE ENCOUNTER
.RN spoke with patient following discharge from hospital. Risk factors for stroke discussed with patient and warning signs for stroke also discussed with patient; teach back method utilized for both pieces of education. Patient verbalized understanding of discharge instructions and medications. Follow up appointment discussed; if not scheduled, reminder message sent to provider office.   Patient instructed to seek medical help via 911 if new symptoms occur; patient verbalized understanding of this instruction. Patient relayed no new questions or comments at this time.    Patient does complain of a mild headache. RN will let MD office know. Patient also inquiring how to set up a LYFT through insurance to get to her MRI and follow up appointment. This is how they sent her home on discharge day. RN will look into this and call patient back.

## 2024-02-20 ENCOUNTER — TELEPHONE (OUTPATIENT)
Dept: NEUROSURGERY | Facility: CLINIC | Age: 59
End: 2024-02-20
Payer: COMMERCIAL

## 2024-02-20 NOTE — TELEPHONE ENCOUNTER
----- Message from Rosie Bhatti sent at 2/20/2024 11:06 AM CST -----  Regarding: Return to work  Contact: [phone]  Patient calling requesting a callback from nurse or provider in regards to a return to work note with no limitations. She asked if you can email her the return to work note to [email or web address]. Please call back as soon as possible if she doesn't answer please leave a voicemail.

## 2024-02-27 ENCOUNTER — TELEPHONE (OUTPATIENT)
Dept: NEUROSURGERY | Facility: CLINIC | Age: 59
End: 2024-02-27
Payer: COMMERCIAL

## 2024-02-27 DIAGNOSIS — I67.1 CEREBRAL ANEURYSM, NONRUPTURED: Primary | ICD-10-CM

## 2024-02-27 NOTE — TELEPHONE ENCOUNTER
----- Message from Rosie Bhatti sent at 2/27/2024  4:39 PM CST -----  Regarding: MRI  Contact: [phone]  Patient calling requesting a callback from nurse or provider in regards to not being able to find anyone in network for her MRI. She wants to know what should she do now about that. She stated her insurance gave her Medical Network NAP form to be filled out from whoever she go to for the MRI. She said she needs advice on what to do next because she is really trying to get the MRI done. Please call back as soon as possible.

## 2024-02-27 NOTE — TELEPHONE ENCOUNTER
Returned called to pt. Informed that nurse Janien is gone for the day. Pt stated that [employer], the insurance, told her that they could not find the hospital in [place] that in their network. Pt also stated that the insurance company told her to call us to request us assist in finding a hospital that accepts her insurance. Pt also requested [facility] Network NAP form to be filled out. Provided fax number. Informed that will let the nurse know, and have she called pt back when she return to office. Pt v/u

## 2024-02-28 ENCOUNTER — TELEPHONE (OUTPATIENT)
Dept: NEUROSURGERY | Facility: CLINIC | Age: 59
End: 2024-02-28
Payer: COMMERCIAL

## 2024-02-28 NOTE — TELEPHONE ENCOUNTER
----- Message from Monica Larios MA sent at 2/27/2024  5:06 PM CST -----  Regarding: FW: MRI  Contact: [phone]  DEANNE Calvert. Can you please advise this pt? Thanks.  ----- Message -----  From: Rosie Bhatti  Sent: 2/27/2024   4:46 PM CST  To: Alisia DURBIN Staff  Subject: MRI                                              Patient calling requesting a callback from nurse or provider in regards to not being able to find anyone in network for her MRI. She wants to know what should she do now about that. She stated her insurance gave her Medical Network NAP form to be filled out from whoever she go to for the MRI. She said she needs advice on what to do next because she is really trying to get the MRI done. Please call back as soon as possible.

## 2024-03-05 ENCOUNTER — TELEPHONE (OUTPATIENT)
Dept: NEUROSURGERY | Facility: CLINIC | Age: 59
End: 2024-03-05
Payer: COMMERCIAL

## 2024-03-06 ENCOUNTER — TELEPHONE (OUTPATIENT)
Dept: NEUROSURGERY | Facility: CLINIC | Age: 59
End: 2024-03-06
Payer: COMMERCIAL

## 2024-03-06 ENCOUNTER — HOSPITAL ENCOUNTER (OUTPATIENT)
Dept: RADIOLOGY | Facility: HOSPITAL | Age: 59
Discharge: HOME | End: 2024-03-06
Attending: STUDENT IN AN ORGANIZED HEALTH CARE EDUCATION/TRAINING PROGRAM
Payer: COMMERCIAL

## 2024-03-06 DIAGNOSIS — I67.1 CEREBRAL ANEURYSM, NONRUPTURED: ICD-10-CM

## 2024-03-06 PROCEDURE — 70546 MR ANGIOGRAPH HEAD W/O&W/DYE: CPT | Mod: 26,59,, | Performed by: RADIOLOGY

## 2024-03-06 PROCEDURE — 25500020 PHARM REV CODE 255: Performed by: STUDENT IN AN ORGANIZED HEALTH CARE EDUCATION/TRAINING PROGRAM

## 2024-03-06 PROCEDURE — 70553 MRI BRAIN STEM W/O & W/DYE: CPT | Mod: 26,,, | Performed by: RADIOLOGY

## 2024-03-06 PROCEDURE — 70546 MR ANGIOGRAPH HEAD W/O&W/DYE: CPT | Mod: 59,TC

## 2024-03-06 PROCEDURE — A9585 GADOBUTROL INJECTION: HCPCS | Performed by: STUDENT IN AN ORGANIZED HEALTH CARE EDUCATION/TRAINING PROGRAM

## 2024-03-06 PROCEDURE — 70553 MRI BRAIN STEM W/O & W/DYE: CPT | Mod: TC

## 2024-03-06 RX ORDER — GADOBUTROL 604.72 MG/ML
9 INJECTION INTRAVENOUS
Status: COMPLETED | OUTPATIENT
Start: 2024-03-06 | End: 2024-03-06

## 2024-03-06 RX ADMIN — GADOBUTROL 9 ML: 604.72 INJECTION INTRAVENOUS at 12:03

## 2024-03-06 NOTE — TELEPHONE ENCOUNTER
----- Message from Afua Melissa sent at 3/6/2024  8:41 AM CST -----  Regarding: pt advice  Contact: [phone]  Pt calling in regards to needing Dr to send over prior authorization to [facility] in [facility].  Pls call

## 2024-03-06 NOTE — TELEPHONE ENCOUNTER
Returned call to pt. Advised by nurse Mehta. Explained we cannot do the prior authorization because the insurance denied the tests. Pt stated that the insurance told her that they approved for the tests. Informed pt that we will contact the authorization department to notify them. Pt v/u

## 2024-03-06 NOTE — TELEPHONE ENCOUNTER
Called and spoke with pt. Informed that we will send it to the PA. Once it finish we will fax it to [facility]

## 2024-03-06 NOTE — TELEPHONE ENCOUNTER
----- Message from Misty Mcgregor sent at 3/6/2024  8:53 AM CST -----  Regarding: prior auth  Contact: @ [phone]  Pt is requesting a prior authorization on the following her MRI per her insurance company which are schedule for today ....Please call and adv @ [phone]   fax # [phone] [facility]

## 2024-03-11 ENCOUNTER — OFFICE VISIT (OUTPATIENT)
Dept: NEUROSURGERY | Facility: CLINIC | Age: 59
End: 2024-03-11
Payer: COMMERCIAL

## 2024-03-11 ENCOUNTER — TELEPHONE (OUTPATIENT)
Dept: NEUROSURGERY | Facility: CLINIC | Age: 59
End: 2024-03-11

## 2024-03-11 VITALS — HEART RATE: 105 BPM | SYSTOLIC BLOOD PRESSURE: 163 MMHG | DIASTOLIC BLOOD PRESSURE: 96 MMHG

## 2024-03-11 DIAGNOSIS — I67.1 CEREBRAL ANEURYSM, NONRUPTURED: Primary | ICD-10-CM

## 2024-03-11 PROCEDURE — 3044F HG A1C LEVEL LT 7.0%: CPT | Mod: CPTII,S$GLB,, | Performed by: NEUROLOGICAL SURGERY

## 2024-03-11 PROCEDURE — 99999 PR PBB SHADOW E&M-EST. PATIENT-LVL III: CPT | Mod: PBBFAC,,, | Performed by: NEUROLOGICAL SURGERY

## 2024-03-11 PROCEDURE — 3080F DIAST BP >= 90 MM HG: CPT | Mod: CPTII,S$GLB,, | Performed by: NEUROLOGICAL SURGERY

## 2024-03-11 PROCEDURE — 4010F ACE/ARB THERAPY RXD/TAKEN: CPT | Mod: CPTII,S$GLB,, | Performed by: NEUROLOGICAL SURGERY

## 2024-03-11 PROCEDURE — 1159F MED LIST DOCD IN RCRD: CPT | Mod: CPTII,S$GLB,, | Performed by: NEUROLOGICAL SURGERY

## 2024-03-11 PROCEDURE — 99215 OFFICE O/P EST HI 40 MIN: CPT | Mod: S$GLB,,, | Performed by: NEUROLOGICAL SURGERY

## 2024-03-11 PROCEDURE — 3077F SYST BP >= 140 MM HG: CPT | Mod: CPTII,S$GLB,, | Performed by: NEUROLOGICAL SURGERY

## 2024-03-11 RX ORDER — VORTIOXETINE 20 MG/1
1 TABLET, FILM COATED ORAL
COMMUNITY
Start: 2024-02-15

## 2024-03-11 RX ORDER — MELOXICAM 15 MG/1
15 TABLET ORAL
COMMUNITY
Start: 2024-02-25

## 2024-03-11 RX ORDER — ATOMOXETINE 80 MG/1
80 CAPSULE ORAL
COMMUNITY
Start: 2024-02-15

## 2024-03-11 RX ORDER — AMLODIPINE BESYLATE 5 MG/1
5 TABLET ORAL
COMMUNITY
Start: 2024-01-11

## 2024-03-11 RX ORDER — METFORMIN HYDROCHLORIDE 500 MG/1
500 TABLET ORAL 2 TIMES DAILY
COMMUNITY
Start: 2024-02-09

## 2024-03-11 RX ORDER — LOSARTAN POTASSIUM 50 MG/1
50 TABLET ORAL
COMMUNITY
Start: 2024-01-11

## 2024-03-11 RX ORDER — PRAZOSIN HYDROCHLORIDE 2 MG/1
2 CAPSULE ORAL NIGHTLY
COMMUNITY
Start: 2024-02-25

## 2024-03-11 RX ORDER — DOXEPIN HYDROCHLORIDE 75 MG/1
75 CAPSULE ORAL NIGHTLY
COMMUNITY
Start: 2024-02-15

## 2024-03-11 NOTE — PROGRESS NOTES
Neurosurgery  History & Physical    SUBJECTIVE:   I, Ileana Childs, scribed for, and in the presence of, Yoel Crump MD. I performed the scribed service and the documentation accurately describes the services I performed. I attest to the accuracy of the note.       Chief Complaint: Headaches and dizziness.     History of Present Illness:  Loreto Goff is a 58 y.o. female presenting today for symptoms post-aneurysm. She reports headaches and dizziness daily. She states that the dizziness is exacerbated by standing up and once she stands up she needs to take a moment to adjust before moving forward. She also states that her neck is very stiff due to her headaches. She denies n/v, numbness, or paresthesias.     Review of patient's allergies indicates:   Allergen Reactions    Codeine Nausea And Vomiting       Current Outpatient Medications   Medication Sig Dispense Refill    levothyroxine (SYNTHROID) 25 MCG tablet Take 1 tablet (25 mcg total) by mouth before breakfast. 30 tablet 11    niMODipine (NIMOTOP) 30 MG Cap Take 2 capsules (60 mg total) by mouth every 4 (four) hours. for 3 days 36 capsule 0    pregabalin (LYRICA) 225 MG Cap Take 1 capsule (225 mg total) by mouth 2 (two) times daily. 60 capsule 0    senna-docusate 8.6-50 mg (PERICOLACE) 8.6-50 mg per tablet Take 2 tablets by mouth 2 (two) times daily. (Patient not taking: Reported on 3/11/2024) 14 tablet 0     No current facility-administered medications for this visit.       No past medical history on file.  No past surgical history on file.  Family History    None       Social History     Socioeconomic History    Marital status:      Social Determinants of Health     Financial Resource Strain: Patient Unable To Answer (1/11/2024)    Overall Financial Resource Strain (CARDIA)     Difficulty of Paying Living Expenses: Patient unable to answer   Food Insecurity: Patient Unable To Answer (1/11/2024)    Hunger Vital Sign     Worried About Running Out of  Food in the Last Year: Patient unable to answer     Ran Out of Food in the Last Year: Patient unable to answer   Transportation Needs: Patient Unable To Answer (1/11/2024)    PRAPARE - Transportation     Lack of Transportation (Medical): Patient unable to answer     Lack of Transportation (Non-Medical): Patient unable to answer   Physical Activity: Patient Unable To Answer (1/11/2024)    Exercise Vital Sign     Days of Exercise per Week: Patient unable to answer     Minutes of Exercise per Session: Patient unable to answer   Stress: Patient Unable To Answer (1/11/2024)    [employer] [place] [employer] - Occupational Stress Questionnaire     Feeling of Stress : Patient unable to answer   Social Connections: Patient Unable To Answer (1/11/2024)    Social Connection and Isolation Panel [NHANES]     Frequency of Communication with Friends and Family: Patient unable to answer     Frequency of Social Gatherings with Friends and Family: Patient unable to answer     Attends [employer] Services: Patient unable to answer     Active Member of Clubs or Organizations: Patient unable to answer     Attends Club or Organization Meetings: Patient unable to answer     Marital Status: Patient unable to answer   Housing Stability: Patient Unable To Answer (1/11/2024)    Housing Stability Vital Sign     Unable to Pay for Housing in the Last Year: Patient unable to answer     Unstable Housing in the Last Year: Patient unable to answer       Review of Systems   Gastrointestinal:  Negative for nausea and vomiting.   Neurological:  Positive for dizziness and headaches. Negative for numbness.   All other systems reviewed and are negative.      OBJECTIVE:     Vital Signs  Pain Score:   9  There is no height or weight on file to calculate BMI.      Physical Exam:    Constitutional: She appears well-developed and well-nourished. She is not diaphoretic. No distress.     Psych/Behavior: She is alert. She is oriented to person, place,  and time. She has a normal mood and affect.         Diagnostic Results:  I have personally reviewed the patient's diagnostic imaging. At this time, there is nothing concerning about her imaging.     ASSESSMENT/PLAN:   Patient is a 58 y.o. female post aneurysm rupture.     Patient reports intense dizziness and headaches daily.   Reviewed imaging is not concerning at this time.   After extensive discussion with the patient, she is aware that headaches and dizziness post-aneurysm are not abnormal. I recommend an MRA with vessel wall imaging in 6 months to see if there are any changes in status. If there is anything concerning on the MRA, we will follow up wth an angiogram. I will also refer her to neurology for management for her headaches.       Note dictated with voice recognition software, please excuse any grammatical errors.

## 2024-03-13 ENCOUNTER — HOME CARE VISIT (OUTPATIENT)
Dept: NEUROLOGY | Facility: HOSPITAL | Age: 59
End: 2024-03-13
Payer: COMMERCIAL

## 2024-03-13 NOTE — PROGRESS NOTES
Spoke with pt re: stroke mobile enrollment. Pt resides in [place], [place]. She states she is progressing well and returned back to work however c/o daily headaches, cervical neck muscle pain that was discussed at her most recent FU appointment with Dr. Crump. She states she had a repeat MRI and another one scheduled for 9/2024. She is s/p coil of aneurysm on 1/11/24. Post op education reviewed as well as medication review. Pt states she has been rationing her prescribed Fioricet because she only has 3 left and these are what helps to make life bearable. She took one today which enabled her to go work today. I messaged Dr Crump re: a refill request to her pharmacy which is [facility]. I gave my contact info for pts futrure needs. [facility]

## 2024-03-14 ENCOUNTER — TELEPHONE (OUTPATIENT)
Dept: NEUROSURGERY | Facility: CLINIC | Age: 59
End: 2024-03-14
Payer: COMMERCIAL

## 2024-03-14 NOTE — TELEPHONE ENCOUNTER
----- Message from Rosie Bhatti sent at 3/14/2024  3:39 PM CDT -----  Regarding: Callback  Contact: [phone]  Patient calling requesting a callback from nurse or provider in regards to not receiving anything from pharmacy yet about butalbital and methcarbinol medication.. Please call back as soon as possible.    [facility] [place] - [address]  [address] [place] [address]  Phone: [phone] Fax: [phone]

## 2024-03-15 ENCOUNTER — TELEPHONE (OUTPATIENT)
Dept: NEUROSURGERY | Facility: CLINIC | Age: 59
End: 2024-03-15
Payer: COMMERCIAL

## 2024-03-15 NOTE — TELEPHONE ENCOUNTER
----- Message from Linn Ednajann Blake sent at 3/15/2024  9:41 AM CDT -----  Regarding: refill  Contact: [phone]  Rx Refill/Request         Is this a Refill or New Rx:  Refill         Rx Name and Strength:    -butalbital-acetaminophen-caffeine -40 mg per tablet 1 tablet   -methocarbamoL tablet    Preferred Pharmacy with phone number:       [id number] - [place] - [address]  [address] [place] [place] [address]  Phone: [phone] Fax: [phone]       Communication Preference:         Additional Information Pt states she has not received medication from the pharmacy. Pt states she needs her medication to refilled. Also she states if the provider will not fill the medication would like an update.

## 2024-03-15 NOTE — TELEPHONE ENCOUNTER
Called and spoke with pt. Explained that we do not prescribe these medication outside of surgery. Explained that pt needs to follow up with the neurologist and have them prescribed those medication for pt. Pt verbalized understanding.

## 2024-04-29 PROBLEM — G45.9 TRANSIENT ISCHEMIC ATTACK: Status: RESOLVED | Noted: 2024-01-24 | Resolved: 2024-04-29

## 2024-05-20 ENCOUNTER — DOCUMENTATION ONLY (OUTPATIENT)
Dept: NEUROLOGY | Facility: HOSPITAL | Age: 59
End: 2024-05-20
Payer: COMMERCIAL

## 2024-09-11 ENCOUNTER — TELEPHONE (OUTPATIENT)
Dept: NEUROSURGERY | Facility: CLINIC | Age: 59
End: 2024-09-11
Payer: COMMERCIAL

## 2024-09-12 ENCOUNTER — PATIENT MESSAGE (OUTPATIENT)
Dept: NEUROSURGERY | Facility: CLINIC | Age: 59
End: 2024-09-12
Payer: COMMERCIAL

## 2024-09-12 NOTE — TELEPHONE ENCOUNTER
Called but was unable to reach pt. LVM with appts dates and times. LVM with clinic number to call back.

## 2024-10-07 ENCOUNTER — HOSPITAL ENCOUNTER (OUTPATIENT)
Dept: RADIOLOGY | Facility: HOSPITAL | Age: 59
Discharge: HOME | End: 2024-10-07
Attending: NEUROLOGICAL SURGERY
Payer: COMMERCIAL

## 2024-10-07 DIAGNOSIS — I67.1 CEREBRAL ANEURYSM, NONRUPTURED: ICD-10-CM

## 2024-10-07 PROCEDURE — 25500020 PHARM REV CODE 255: Performed by: NEUROLOGICAL SURGERY

## 2024-10-07 PROCEDURE — 70546 MR ANGIOGRAPH HEAD W/O&W/DYE: CPT | Mod: TC

## 2024-10-07 PROCEDURE — A9585 GADOBUTROL INJECTION: HCPCS | Performed by: NEUROLOGICAL SURGERY

## 2024-10-07 PROCEDURE — 70546 MR ANGIOGRAPH HEAD W/O&W/DYE: CPT | Mod: 26,,, | Performed by: RADIOLOGY

## 2024-10-07 RX ORDER — GADOBUTROL 604.72 MG/ML
10 INJECTION INTRAVENOUS
Status: COMPLETED | OUTPATIENT
Start: 2024-10-07 | End: 2024-10-07

## 2024-10-07 RX ADMIN — GADOBUTROL 10 ML: 604.72 INJECTION INTRAVENOUS at 09:10

## 2024-10-21 ENCOUNTER — OFFICE VISIT (OUTPATIENT)
Dept: NEUROSURGERY | Facility: CLINIC | Age: 59
End: 2024-10-21
Payer: COMMERCIAL

## 2024-10-21 ENCOUNTER — TELEPHONE (OUTPATIENT)
Dept: NEUROSURGERY | Facility: CLINIC | Age: 59
End: 2024-10-21
Payer: COMMERCIAL

## 2024-10-21 VITALS — HEART RATE: 108 BPM | DIASTOLIC BLOOD PRESSURE: 92 MMHG | SYSTOLIC BLOOD PRESSURE: 153 MMHG

## 2024-10-21 DIAGNOSIS — I67.1 CEREBRAL ANEURYSM, NONRUPTURED: Primary | ICD-10-CM

## 2024-10-21 DIAGNOSIS — I67.1 CEREBRAL ANEURYSM: Primary | ICD-10-CM

## 2024-10-21 PROCEDURE — 4010F ACE/ARB THERAPY RXD/TAKEN: CPT | Mod: CPTII,S$GLB,, | Performed by: NEUROLOGICAL SURGERY

## 2024-10-21 PROCEDURE — 3077F SYST BP >= 140 MM HG: CPT | Mod: CPTII,S$GLB,, | Performed by: NEUROLOGICAL SURGERY

## 2024-10-21 PROCEDURE — 99999 PR PBB SHADOW E&M-EST. PATIENT-LVL III: CPT | Mod: PBBFAC,,, | Performed by: NEUROLOGICAL SURGERY

## 2024-10-21 PROCEDURE — 3080F DIAST BP >= 90 MM HG: CPT | Mod: CPTII,S$GLB,, | Performed by: NEUROLOGICAL SURGERY

## 2024-10-21 PROCEDURE — 3044F HG A1C LEVEL LT 7.0%: CPT | Mod: CPTII,S$GLB,, | Performed by: NEUROLOGICAL SURGERY

## 2024-10-21 PROCEDURE — 99214 OFFICE O/P EST MOD 30 MIN: CPT | Mod: S$GLB,,, | Performed by: NEUROLOGICAL SURGERY

## 2024-10-21 PROCEDURE — 1159F MED LIST DOCD IN RCRD: CPT | Mod: CPTII,S$GLB,, | Performed by: NEUROLOGICAL SURGERY

## 2024-11-14 ENCOUNTER — PATIENT MESSAGE (OUTPATIENT)
Dept: NEUROSURGERY | Facility: CLINIC | Age: 59
End: 2024-11-14
Payer: COMMERCIAL

## 2024-11-22 ENCOUNTER — PATIENT MESSAGE (OUTPATIENT)
Dept: NEUROSURGERY | Facility: CLINIC | Age: 59
End: 2024-11-22
Payer: COMMERCIAL

## 2024-11-25 ENCOUNTER — PATIENT MESSAGE (OUTPATIENT)
Dept: NEUROSURGERY | Facility: CLINIC | Age: 59
End: 2024-11-25
Payer: COMMERCIAL

## 2024-11-26 ENCOUNTER — PATIENT MESSAGE (OUTPATIENT)
Dept: NEUROSURGERY | Facility: CLINIC | Age: 59
End: 2024-11-26
Payer: COMMERCIAL

## 2024-12-03 ENCOUNTER — PATIENT MESSAGE (OUTPATIENT)
Dept: NEUROSURGERY | Facility: CLINIC | Age: 59
End: 2024-12-03
Payer: COMMERCIAL

## 2024-12-18 ENCOUNTER — PATIENT MESSAGE (OUTPATIENT)
Dept: NEUROSURGERY | Facility: CLINIC | Age: 59
End: 2024-12-18
Payer: COMMERCIAL

## 2025-01-27 ENCOUNTER — PATIENT MESSAGE (OUTPATIENT)
Dept: INTERVENTIONAL RADIOLOGY/VASCULAR | Facility: HOSPITAL | Age: 60
End: 2025-01-27
Payer: COMMERCIAL

## 2025-01-28 ENCOUNTER — TELEPHONE (OUTPATIENT)
Dept: INTERVENTIONAL RADIOLOGY/VASCULAR | Facility: HOSPITAL | Age: 60
End: 2025-01-28
Payer: COMMERCIAL

## 2025-01-28 ENCOUNTER — PATIENT MESSAGE (OUTPATIENT)
Dept: NEUROSURGERY | Facility: CLINIC | Age: 60
End: 2025-01-28
Payer: COMMERCIAL

## 2025-01-28 NOTE — NURSING
Pre-procedure call complete.  2 patient identifier used (name and ).      No food after midnight.  You may drink clear liquids (sports drinks, clear juices) until 2 hours before arrival time.  Clear liquids include only water, black coffee (no creamer), clear oral rehydration drinks, clear sports drinks and clear fruit juices.  Clear liquids do NOT include anything with pulp or food particles (chicken broth, ice cream, yogurt, jello, etc).  NO orange juice, pulpy juices, or apple cider.  If you can read newsprint through the liquid, it qualifies as clear.  IF UNSURE, drink water instead.      Have NOTHING BY MOUTH 2 hours before arrival time.  Medication the morning of your procedure may be taken with a sip of water     Pt aware will need someone to provide transport home and monitor pt 8 hours post procedure.  No driving for at least 24 hours after procedure.   Patient advised to take blood pressure medications with a sip of water morning of procedure.  Patient verbalized aware of which medications to take.  Do not take oral diabetic medication and sleep medication (including OTC) the night before procedure.  Arrival time and location given.  Expected length of stay reviewed.  Covid screening completed.  Pt verbalized understanding of all pre-procedure instructions.  Written instructions and directions sent to patient in Mychart/portal.

## 2025-01-29 ENCOUNTER — TELEPHONE (OUTPATIENT)
Dept: NEUROSURGERY | Facility: CLINIC | Age: 60
End: 2025-01-29
Payer: COMMERCIAL

## 2025-01-29 ENCOUNTER — PATIENT MESSAGE (OUTPATIENT)
Dept: NEUROSURGERY | Facility: CLINIC | Age: 60
End: 2025-01-29
Payer: COMMERCIAL

## 2025-01-29 NOTE — TELEPHONE ENCOUNTER
Called and spoke with pt. She is going to [facility] to have labs done today.

## 2025-01-30 ENCOUNTER — HOSPITAL ENCOUNTER (OUTPATIENT)
Dept: INTERVENTIONAL RADIOLOGY/VASCULAR | Facility: HOSPITAL | Age: 60
Discharge: HOME | End: 2025-01-30
Attending: NEUROLOGICAL SURGERY
Payer: COMMERCIAL

## 2025-01-30 DIAGNOSIS — I67.1 CEREBRAL ANEURYSM, NONRUPTURED: ICD-10-CM

## 2025-01-30 LAB — POCT GLUCOSE: 92 MG/DL (ref 70–110)

## 2025-01-30 PROCEDURE — 25000003 PHARM REV CODE 250: Performed by: STUDENT IN AN ORGANIZED HEALTH CARE EDUCATION/TRAINING PROGRAM

## 2025-01-30 PROCEDURE — 99152 MOD SED SAME PHYS/QHP 5/>YRS: CPT | Performed by: NEUROLOGICAL SURGERY

## 2025-01-30 PROCEDURE — 36224 PLACE CATH CAROTD ART: CPT | Mod: LT | Performed by: NEUROLOGICAL SURGERY

## 2025-01-30 PROCEDURE — 82962 GLUCOSE BLOOD TEST: CPT

## 2025-01-30 PROCEDURE — C1889 IMPLANT/INSERT DEVICE, NOC: HCPCS

## 2025-01-30 PROCEDURE — C1769 GUIDE WIRE: HCPCS

## 2025-01-30 PROCEDURE — 36226 PLACE CATH VERTEBRAL ART: CPT | Mod: LT | Performed by: NEUROLOGICAL SURGERY

## 2025-01-30 PROCEDURE — 63600175 PHARM REV CODE 636 W HCPCS: Performed by: STUDENT IN AN ORGANIZED HEALTH CARE EDUCATION/TRAINING PROGRAM

## 2025-01-30 PROCEDURE — 99153 MOD SED SAME PHYS/QHP EA: CPT | Performed by: NEUROLOGICAL SURGERY

## 2025-01-30 PROCEDURE — C1760 CLOSURE DEV, VASC: HCPCS

## 2025-01-30 PROCEDURE — 25500020 PHARM REV CODE 255: Performed by: NEUROLOGICAL SURGERY

## 2025-01-30 PROCEDURE — C1894 INTRO/SHEATH, NON-LASER: HCPCS

## 2025-01-30 RX ORDER — IBUPROFEN 200 MG
TABLET ORAL
Status: DISCONTINUED
Start: 2025-01-30 | End: 2025-01-31 | Stop reason: HOSPADM

## 2025-01-30 RX ORDER — MIDAZOLAM HYDROCHLORIDE 1 MG/ML
INJECTION, SOLUTION INTRAMUSCULAR; INTRAVENOUS
Status: COMPLETED | OUTPATIENT
Start: 2025-01-30 | End: 2025-01-30

## 2025-01-30 RX ORDER — IBUPROFEN 200 MG
400 TABLET ORAL ONCE
Status: COMPLETED | OUTPATIENT
Start: 2025-01-30 | End: 2025-01-30

## 2025-01-30 RX ORDER — FENTANYL CITRATE 50 UG/ML
INJECTION, SOLUTION INTRAMUSCULAR; INTRAVENOUS
Status: COMPLETED | OUTPATIENT
Start: 2025-01-30 | End: 2025-01-30

## 2025-01-30 RX ORDER — LIDOCAINE HYDROCHLORIDE 10 MG/ML
1 INJECTION, SOLUTION EPIDURAL; INFILTRATION; INTRACAUDAL; PERINEURAL ONCE
Status: DISCONTINUED | OUTPATIENT
Start: 2025-01-30 | End: 2025-01-31 | Stop reason: HOSPADM

## 2025-01-30 RX ORDER — SODIUM CHLORIDE 9 MG/ML
INJECTION, SOLUTION INTRAVENOUS CONTINUOUS
Status: DISCONTINUED | OUTPATIENT
Start: 2025-01-30 | End: 2025-01-31 | Stop reason: HOSPADM

## 2025-01-30 RX ORDER — SODIUM CHLORIDE 0.9 % (FLUSH) 0.9 %
10 SYRINGE (ML) INJECTION
Status: DISCONTINUED | OUTPATIENT
Start: 2025-01-30 | End: 2025-01-31 | Stop reason: HOSPADM

## 2025-01-30 RX ORDER — IODIXANOL 320 MG/ML
100 INJECTION, SOLUTION INTRAVASCULAR
Status: COMPLETED | OUTPATIENT
Start: 2025-01-30 | End: 2025-01-30

## 2025-01-30 RX ADMIN — FENTANYL CITRATE 50 MCG: 50 INJECTION, SOLUTION INTRAMUSCULAR; INTRAVENOUS at 03:01

## 2025-01-30 RX ADMIN — IBUPROFEN 400 MG: 200 TABLET, FILM COATED ORAL at 04:01

## 2025-01-30 RX ADMIN — MIDAZOLAM HYDROCHLORIDE 0.5 MG: 2 INJECTION, SOLUTION INTRAMUSCULAR; INTRAVENOUS at 03:01

## 2025-01-30 RX ADMIN — MIDAZOLAM HYDROCHLORIDE 2 MG: 2 INJECTION, SOLUTION INTRAMUSCULAR; INTRAVENOUS at 03:01

## 2025-01-30 RX ADMIN — IODIXANOL 30 ML: 320 INJECTION, SOLUTION INTRAVASCULAR at 03:01

## 2025-01-30 NOTE — PROCEDURES
Interventional Neuroradiology Post-Procedure Note    Pre Op Diagnosis: Balloon assisted coiling of ruptured A.Comm aneurysm in 01/2024.     Post Op Diagnosis: Same    Procedure: Diagnostic cerebral angiogram    Procedure performed by: Lebron Tong MD; Parmjit Hill MD; Eliezer FAIRCHILD, Irwin    Written Informed Consent Obtained: Yes    Specimen Removed: NO    Estimated Blood Loss: Minimal    Procedure report:   A 5F sheath was placed into the right femoral artery and a 5F Ruddy catheter was advanced into the aortic arch.  The left ICA and left vertebral arteries were subselected and angiography of the brain was performed after injection into each of these vessels. Also, 3D spin was performed with reconstruction at separate workstation to obtain more details and further characterize the aneurysm during angiogram of the left ICA.    Preliminary interpretation: Previously seen and treated A.Comm aneurysm with residual neck filling (residual aneurysm is about 2mm x 1.5mm). Stable coil mass in the dome and body of aneurysm. There is small outpouching at the A.Chor segment of the left ICA, likely an infundibulum. Please see Imaging report for full details.    A right femoral artery angiogram was performed, the sheath removed and hemostasis achieved using 5F Vascade device.  No hematoma was present at the time of hemostasis.    The patient tolerated the procedure well.     Plan:  -Bed rest for 2h  -Groin check and pulse check q2h   -Avoid carrying heavy weights > 10 lbs x 24 hrs   -Remove groin dressing tomorrow                       Parmjit Hill MD, MHA  Fellow, NeuroEndovascular Surgery, [facility] [place]  Neurologist, [facility], [place]

## 2025-01-30 NOTE — PLAN OF CARE
Procedure completed. Patient tolerated well; VSS. Hemostasis achieved to right groin via Vascade at 1540; patient to remain flat until 1740. Site CDI without hematoma. Patient to be transported to [facility] accompanied by this RN; report to be given at the bedside.

## 2025-01-30 NOTE — PLAN OF CARE
Pt arrived to  for cerebral angiogram. Pt oriented to unit and staff, Pt safely transferred from stretcher to procedural table. Fall risk reviewed and comfort measures utilized with interventions. Safety strap applied, position pillows to minimize pressure points. Blankets applied. Pt prepped and draped utilizing standard sterile technique. Patient placed on continuous monitoring, as required by sedation policy. Timeouts implemented utilizing standard universal time-out per department and facility policy. RN to remain at bedside with continuous monitoring. Pt resting comfortably. Denies pain/discomfort. Will continue to monitor. See flow sheets for monitoring, medication administration, and updates. patient verbalizes understanding.

## 2025-01-30 NOTE — H&P
Interventional Neuroradiology Pre-procedure Note    Procedure: Diagnostic cerebral angiogram    History of Present Illness:  Loreto Goff is a 59 y.o. female who presents for f/u DSA after balloon assisted coiling of ruptured A.Comm aneurysm in 01/2024.    ROS:   Hematological: no known coagulopathies  Respiratory: no shortness of breath  Cardiovascular: no chest pain  Gastrointestinal: no abdominal pain  Genito-Urinary: no dysuria  Musculoskeletal: negative  Neurological: no TIA or stroke symptoms     Scheduled Meds:    LIDOcaine (PF) 10 mg/ml (1%)  1 mL Other Once     Current Meds:   Current Outpatient Medications:     amLODIPine (NORVASC) 5 MG tablet, Take 5 mg by mouth., Disp: , Rfl:     atomoxetine (STRATTERA) 80 MG capsule, Take 80 mg by mouth., Disp: , Rfl:     losartan (COZAAR) 50 MG tablet, Take 50 mg by mouth., Disp: , Rfl:     metFORMIN (GLUCOPHAGE) 500 MG tablet, Take 500 mg by mouth 2 (two) times daily., Disp: , Rfl:     doxepin (SINEQUAN) 75 MG capsule, Take 75 mg by mouth every evening., Disp: , Rfl:     levothyroxine (SYNTHROID) 25 MCG tablet, Take 1 tablet (25 mcg total) by mouth before breakfast., Disp: 30 tablet, Rfl: 11    meloxicam (MOBIC) 15 MG tablet, Take 15 mg by mouth., Disp: , Rfl:     niMODipine (NIMOTOP) 30 MG Cap, Take 2 capsules (60 mg total) by mouth every 4 (four) hours. for 3 days, Disp: 36 capsule, Rfl: 0    prazosin (MINIPRESS) 2 MG Cap, Take 2 mg by mouth every evening., Disp: , Rfl:     pregabalin (LYRICA) 225 MG Cap, Take 1 capsule (225 mg total) by mouth 2 (two) times daily., Disp: 60 capsule, Rfl: 0    senna-docusate 8.6-50 mg (PERICOLACE) 8.6-50 mg per tablet, Take 2 tablets by mouth 2 (two) times daily. (Patient not taking: Reported on 3/11/2024), Disp: 14 tablet, Rfl: 0    TRINTELLIX 20 mg Tab, Take 1 tablet by mouth., Disp: , Rfl:     Current Facility-Administered Medications:     0.9% NaCl infusion, , Intravenous, Continuous, Noelle Clark, TOSHIA    LIDOcaine (PF) 10  "mg/ml (1%) injection 10 mg, 1 mL, Other, Once, Noelle Clark PA-C   Continuous Infusions:    0.9% NaCl   Intravenous Continuous         PRN Meds:    Allergies:   Review of patient's allergies indicates:   Allergen Reactions    Codeine Nausea And Vomiting     Sedation Hx: No adverse events.    Labs:              Objective:  Vitals: Blood pressure (!) 141/70, pulse 89, temperature 98.2 °F (36.8 °C), temperature source Temporal, resp. rate 18, height 5' 7" (1.702 m), weight 97.1 kg (214 lb 1.1 oz), SpO2 99%, not currently breastfeeding.     Physical Exam:  General: well developed, well nourished, no distress.   Head: normocephalic, atraumatic  Neck: No tracheal deviation. No palpable masses. Full ROM.   Neurologic: Alert and oriented. Thought content appropriate.  GCS: E4 V5 M6; Total: 15  Language: No aphasia  Speech: No dysarthria  Cranial nerves: face symmetric, tongue midline, CN II-XII grossly intact.   Eyes: pupils equal, round, reactive to light with accomodation, EOMI.   Pulmonary: normal respirations, no signs of respiratory distress  Abdomen: soft, non-distended, not tender to palpation  Vascular: Pulses 2+ and symmetric radial and dorsalis pedis. No LE edema.   Skin: Skin is warm, dry and intact.  Sensory: intact to light touch throughout  Motor Strength: Moves all extremities spontaneously with good tone.  Full strength upper and lower extremities. No abnormal movements seen.     ASA: 2  MAL: 2    Plan:  -Plan for cerebral angiogram   -Sedation Plan: moderate sedation   -All diagnostics and imaging reviewed  -Patient NPO since MN  -Risks & benefits of procedure explained in detail; patient consented and all questions answered  -Further reccs to follow procedure          Parmjit Hill MD, MHA  Fellow, NeuroEndovascular Surgery, [facility] [place] [facility] Grimaldo  Neurologist, [facility], [place]    "

## 2025-01-31 VITALS
TEMPERATURE: 98 F | RESPIRATION RATE: 14 BRPM | BODY MASS INDEX: 33.6 KG/M2 | HEIGHT: 67 IN | DIASTOLIC BLOOD PRESSURE: 73 MMHG | SYSTOLIC BLOOD PRESSURE: 154 MMHG | OXYGEN SATURATION: 99 % | WEIGHT: 214.06 LBS | HEART RATE: 85 BPM

## 2025-01-31 NOTE — CARE UPDATE
2 hour post procedure monitoring complete at this time, dressing to right groin remains CDI, no redness or hematoma noted at this time, discharge instructions reviewed with patient and son, educational handouts/AVS also provided for reference, IV to right hand removed without difficulty, catheter tip intact, patient transported to [facility] via wheelchair in [facility].    

## 2025-01-31 NOTE — DISCHARGE INSTRUCTIONS
"For questions or concerns that are non-emergent, you may call our Radiology Clinic at [phone].    **After hours and weekends call [phone] and ask for "Radiology Resident on Call."        Cerebral Angiography    What happens during a cerebral angiography?    An IV (intravenous line is started in your arm. You may be given a medicine that helps you relax (sedative)  Youre given an injection to numb the site where the catheter will be inserted. This is usually in the groin area  A small puncture is made into the artery, and the catheter is inserted into the blood vessel. Using X-rays, the catheter is then carefully guided through the artery.  Contrast fluid is injected through the catheter into the artery. You may feel warmth or pressure in your Head, neck, or chest. You may be asked to hold your breath and be still during injections. When the procedure is complete, the catheter is removed and pressure is held at the groin or wrist.    What happens after cerebral angiography    Youll be taken to a recovery area.  You will probably need to lay flat for 2-4 hours    Once you are at home    Dont drive for 24 hours  Avoid walking bending, lifting, and taking stairs for 24 hours.  Avoid lifting anything over 5 pounds for 7 days  Be sure to follow any other instructions from your doctor    When should I call my health care provider?    Call your doctor if you have any of the following:    Severe headache, visual problems, new weakness, dizziness, or trouble speaking  Fever of 100.4 (38C) or higher lasting for 24 to 48 hours  Bleeding, swelling, or a large lump at the insertions site  Sharp or increasing pain at the insertion site  Leg pain, numbness, or a cold leg or foot  Any other symptoms your provider instructed you to report based on your medical condition.  "

## 2025-02-05 ENCOUNTER — TELEPHONE (OUTPATIENT)
Dept: NEUROSURGERY | Facility: CLINIC | Age: 60
End: 2025-02-05
Payer: COMMERCIAL

## 2025-02-05 NOTE — TELEPHONE ENCOUNTER
----- Message from Tish sent at 2/5/2025 10:27 AM CST -----  Regarding: Pt called states she wl;d like to speak with nurse in regards to what she needs to have for her upcoming appt appt  Contact: [phone]  Name of Who is Calling:LILLIANA SMILEY [[id number]]        What is the request in detail: Pt called states she wld like to speak with nurse in regards to what she needs to have for her upcoming appt appt. Please advise     Can the clinic reply by [facility]:No        What Number to Call Back if not in [facility]: Telephone Information:  Mobile          [phone]

## 2025-02-12 ENCOUNTER — PATIENT MESSAGE (OUTPATIENT)
Dept: NEUROSURGERY | Facility: CLINIC | Age: 60
End: 2025-02-12

## 2025-02-12 ENCOUNTER — CLINICAL SUPPORT (OUTPATIENT)
Dept: NEUROSURGERY | Facility: CLINIC | Age: 60
End: 2025-02-12
Payer: COMMERCIAL

## 2025-02-12 DIAGNOSIS — I67.1 CEREBRAL ANEURYSM, NONRUPTURED: Primary | ICD-10-CM

## 2025-02-12 NOTE — PROGRESS NOTES
Audio Only Telehealth Visit     Angiogram performed on 01/30/25, by Dr. Crump  Site rt groin  Reviewed post angiogram instructions with patient.  Keep incision GENNARO, no lotions, creams or bandages.  Ok to shower without direct water pressure to incision. Pat dry after shower.  Do not submerge wound in bath tub or go swimming until released by surgeon.  No lifting > 10lbs.  No twisting or bending surgical area greater than 45 degrees from neutral position.  Patient encouraged to walk as much as possible but advised to walk with someone and rest as necessary.  Take over the counter stool softner/laxative for constipation.  Call your doctor or report to the Emergency Room for any signs of infection, including: increased redness, drainage, pain or fever (temperature greater than or equal to 101.5 for 24 hours). Call doctor or go to the Emergency Room if there are any localized neurological changes; problems with speech, vision, numbness, tingling, weakness, bleeding/swelling from site or severe headache; or for other concerns.    Answered all questions. Pt vu.     Future Appointments   Date Time Provider Department Center   2/12/2025 11:30 AM Janine Hoffmann, JOHANA [facility]   3/11/2025  1:45 PM Lebron Crump MD [facility]

## 2025-03-12 ENCOUNTER — PATIENT MESSAGE (OUTPATIENT)
Dept: NEUROSURGERY | Facility: CLINIC | Age: 60
End: 2025-03-12
Payer: COMMERCIAL

## 2025-03-14 ENCOUNTER — PATIENT MESSAGE (OUTPATIENT)
Dept: NEUROSURGERY | Facility: CLINIC | Age: 60
End: 2025-03-14

## 2025-03-14 ENCOUNTER — OFFICE VISIT (OUTPATIENT)
Dept: NEUROSURGERY | Facility: CLINIC | Age: 60
End: 2025-03-14
Payer: COMMERCIAL

## 2025-03-14 ENCOUNTER — TELEPHONE (OUTPATIENT)
Dept: NEUROSURGERY | Facility: CLINIC | Age: 60
End: 2025-03-14
Payer: COMMERCIAL

## 2025-03-14 DIAGNOSIS — I67.1 CEREBRAL ANEURYSM: ICD-10-CM

## 2025-03-14 DIAGNOSIS — I67.1 CEREBRAL ANEURYSM, NONRUPTURED: Primary | ICD-10-CM

## 2025-03-14 DIAGNOSIS — I67.1 ANTERIOR COMMUNICATING ARTERY ANEURYSM: ICD-10-CM

## 2025-03-14 DIAGNOSIS — I60.9 SAH (SUBARACHNOID HEMORRHAGE): ICD-10-CM

## 2025-03-14 NOTE — PROGRESS NOTES
Neurosurgery  Established Patient    Scribe Attestation  I, Tara Mitchell, attest that this documentation has been prepared under the direction and in the presence of Lebron Crump MD.    Virtual visit Attestation   The patient location is: Home  The chief complaint leading to consultation is: ACOM aneurysm, f/u angiogram   Visit type: audiovisual  Total time spent with patient: 20 min     Each patient to whom he or she provides medical services by telemedicine is:  (1) informed of the relationship between the physician and patient and the respective role of any other health care provider with respect to management of the patient; and (2) notified that he or she may decline to receive medical services by telemedicine and may withdraw from such care at any time.     SUBJECTIVE:     History of Present Illness 03/11/24:  Loreto Goff is a 58 y.o. female presenting today for symptoms post-aneurysm. She reports headaches and dizziness daily. She states that the dizziness is exacerbated by standing up and once she stands up she needs to take a moment to adjust before moving forward. She also states that her neck is very stiff due to her headaches. She denies n/v, numbness, or paresthesias.      Interval history 10/15/24:  Patient returns to clinic for f/u after obtaining MRA brain for further investigation of ACOM aneurysm s/p coil (01/11/24). Today she reports feeling fine other than having a slight cough. No other acute changes or issues to report. Pt is not on ASA 81 mg.     Interval Hx 03/11/25:  Ms. Goff returns to clinic via virtual visit for f/u after undergoing a cerebral angiogram for evaluation of ACOM aneurysm. Today she reports having headaches that began following aneurysm rupture. HA occur about 3x/week and last all day. Describes HA as feeling like pressure beginning at the back of her head and travels forward throughout the day. Has continued to worsen and increase in frequency. She also has  intermittent nausea. Nausea does not occur with her HA. Symptoms negatively impact her daily life. No vomiting. Currently on gabapentin.    Review of patient's allergies indicates:   Allergen Reactions    Codeine Nausea And Vomiting       Current Medications[1]    Past Medical History:   Diagnosis Date    Diabetes mellitus     Hypertension      Past Surgical History:   Procedure Laterality Date    TUBAL LIGATION Bilateral 1995     Family History    None       Social History     Socioeconomic History    Marital status:      Social Drivers of Health     Financial Resource Strain: Patient Unable To Answer (1/11/2024)    Overall Financial Resource Strain (CARDIA)     Difficulty of Paying Living Expenses: Patient unable to answer   Food Insecurity: No Food Insecurity (6/9/2024)    Received from [facility] [employer] [facility]    Hunger Vital Sign     Worried About Running Out of Food in the Last Year: Never true     Ran Out of Food in the Last Year: Never true   Transportation Needs: No Transportation Needs (6/9/2024)    Received from [facility] [employer] [facility]    PRAPARE - Transportation     Lack of Transportation (Medical): No     Lack of Transportation (Non-Medical): No   Physical Activity: Patient Unable To Answer (1/11/2024)    Exercise Vital Sign     Days of Exercise per Week: Patient unable to answer     Minutes of Exercise per Session: Patient unable to answer   Stress: Patient Unable To Answer (1/11/2024)    [place] [employer] - Occupational Stress Questionnaire     Feeling of Stress : Patient unable to answer   Housing Stability: Low Risk  (6/9/2024)    [facility] [employer] [facility]    Housing Stability Vital Sign     Unable to Pay for Housing in the Last Year: No     Number of Times Moved in the Last Year: 1     Homeless in the Last Year: No       Review of Systems   Gastrointestinal:  Positive for nausea.   Neurological:  Positive for headaches.   All other systems  reviewed and are negative.    OBJECTIVE:     Vital Signs     There is no height or weight on file to calculate BMI.    Neurosurgery Physical Exam  On virtual audio visual visit   Head is normocephalic atraumatic   Neck is grossly supple  No appreciable labored breathing   Admitting appears to be nontender   Patient is able to move extremities     On virtual audio visual visit the patient's speech is fluent, goal directed without any noted dysarthria or aphasia   Unable to evaluate pupils on virtual audio visual visit   Face is symmetric   Tongue is midline  Unable to evaluate palate   Hearing appears to be intact on virtual audio visual visit to voice   Patient able to move both upper and lower extremities without any gross motor asymmetry on virtual audio visual visit     Diagnostic Results:  I personally reviewed the patient's Diagnostic Imaging.     IR Angiogram Carotid Cerebral Bilateral 01/30/25  About 1.5 mm x 2 mm residual neck of the previously coiled anterior communicating artery aneurysm.  Dome and body of the aneurysm is packed with stable coil mass without any concerns.  There were no complications.    MRA Brain W WO Cont 10/07/24  Postsurgical change from prior anterior communicating artery aneurysm coiling.  Probable small focus residual filling the neck of the aneurysm where there is some associated vessel wall enhancement.     No appreciable change in the untreated aneurysm at the left PICA origin, again measuring on the order of 2-3 mm.    ASSESSMENT/PLAN:   58 y/o female with history of ACOM aneurysm rupture s/p coil (01/11/24) now presenting with worsening headaches and nausea.     Patient presents with worsening headaches and intermittent nausea.    Discussed imaging results of IR Angiogram Carotid Cerebral revealing 1.5 mm x 2 mm residual neck of the previously coiled anterior communicating artery aneurysm.  Dome and body of the aneurysm is packed with stable coil mass without any  concerns.  Reviewed imaging results of MRA Brain revealing postsurgical change from prior anterior communicating artery aneurysm coiling with small focus residual filling the neck of the aneurysm where there is some associated vessel wall enhancement.    After discussion with the patient, I recommend follow up with repeat MRA Brain with vessel wall imaging in 3 months. If there is evidence of residual filling of aneurysm, we discussed moving forward with possible surgical treatment of aneurysm. I will also consult with her neurologist, Dr. Benson, to address patient's worsening headaches and nausea. I have answered all of their questions and patient wish to proceed with this plan. We will schedule patient.       Thank you so very much for allowing me to participate in the care of this patient.  Please feel free to call any questions, comments, or concerns.     Lebron Crump MD,MSc  Department of Neurosurgery   Department of Radiology  Department of Neurology  [facility]    [facility]   [facility] [employer] [facility] [employer]     Total time spent in counseling and discussion about further management options including relevant lab work, treatment,  prognosis, medications and intended side effects was more than 60 minutes. More than 50 % of the time was spent in counseling and coordination of care.  I spent a total of 60 minutes on the day of the visit.This includes face to face time and non-face to face time preparing to see the patient (eg, review of tests), Obtaining and/or reviewing separately obtained history, Documenting clinical information in the electronic or other health record, Independently interpreting resultsand communicating results to the patient/family/caregiver, or Care coordination.     Scribe Attestation  I, Dr. Lebron Crump personally performed the services described in this documentation.  All medical record entries made by the scribe, Tara Mitchell, were at my direction and in my presence.  I have reviewed the chart and agree that the record reflects my personal performance and is accurate and complete.           [1]   Current Outpatient Medications   Medication Sig Dispense Refill    amLODIPine (NORVASC) 5 MG tablet Take 5 mg by mouth.      atomoxetine (STRATTERA) 80 MG capsule Take 80 mg by mouth.      doxepin (SINEQUAN) 75 MG capsule Take 75 mg by mouth every evening.      levothyroxine (SYNTHROID) 25 MCG tablet Take 1 tablet (25 mcg total) by mouth before breakfast. 30 tablet 11    losartan (COZAAR) 50 MG tablet Take 50 mg by mouth.      meloxicam (MOBIC) 15 MG tablet Take 15 mg by mouth.      metFORMIN (GLUCOPHAGE) 500 MG tablet Take 500 mg by mouth 2 (two) times daily.      niMODipine (NIMOTOP) 30 MG Cap Take 2 capsules (60 mg total) by mouth every 4 (four) hours. for 3 days 36 capsule 0    prazosin (MINIPRESS) 2 MG Cap Take 2 mg by mouth every evening.      pregabalin (LYRICA) 225 MG Cap Take 1 capsule (225 mg total) by mouth 2 (two) times daily. 60 capsule 0    senna-docusate 8.6-50 mg (PERICOLACE) 8.6-50 mg per tablet Take 2 tablets by mouth 2 (two) times daily. (Patient not taking: Reported on 3/11/2024) 14 tablet 0    TRINTELLIX 20 mg Tab Take 1 tablet by mouth.       No current facility-administered medications for this visit.

## 2025-05-28 ENCOUNTER — TELEPHONE (OUTPATIENT)
Dept: NEUROSURGERY | Facility: CLINIC | Age: 60
End: 2025-05-28
Payer: COMMERCIAL

## 2025-05-28 NOTE — TELEPHONE ENCOUNTER
----- Message from Misty sent at 5/28/2025  3:12 PM CDT -----  Pt calling to get fax surgery clearance to her Dr , Orthopedic surgeon   Confirmed patient's contact info below:Contact Name: Loreto Mandel Number: [phone]

## 2025-06-10 ENCOUNTER — PATIENT MESSAGE (OUTPATIENT)
Dept: NEUROSURGERY | Facility: CLINIC | Age: 60
End: 2025-06-10
Payer: COMMERCIAL

## 2025-06-10 DIAGNOSIS — I67.1 CEREBRAL ANEURYSM, NONRUPTURED: Primary | ICD-10-CM

## 2025-06-11 ENCOUNTER — HOSPITAL ENCOUNTER (OUTPATIENT)
Dept: RADIOLOGY | Facility: HOSPITAL | Age: 60
Discharge: HOME | End: 2025-06-11
Attending: NEUROLOGICAL SURGERY
Payer: COMMERCIAL

## 2025-06-11 DIAGNOSIS — I67.1 CEREBRAL ANEURYSM, NONRUPTURED: ICD-10-CM

## 2025-06-11 PROCEDURE — 70545 MR ANGIOGRAPHY HEAD W/DYE: CPT | Mod: 26,,, | Performed by: RADIOLOGY

## 2025-06-11 PROCEDURE — 25500020 PHARM REV CODE 255: Mod: PO | Performed by: NEUROLOGICAL SURGERY

## 2025-06-11 PROCEDURE — A9585 GADOBUTROL INJECTION: HCPCS | Mod: PO | Performed by: NEUROLOGICAL SURGERY

## 2025-06-11 PROCEDURE — 70545 MR ANGIOGRAPHY HEAD W/DYE: CPT | Mod: TC,PO

## 2025-06-11 RX ORDER — GADOBUTROL 604.72 MG/ML
9 INJECTION INTRAVENOUS
Status: COMPLETED | OUTPATIENT
Start: 2025-06-11 | End: 2025-06-11

## 2025-06-11 RX ADMIN — GADOBUTROL 9 ML: 604.72 INJECTION INTRAVENOUS at 11:06

## 2025-06-17 ENCOUNTER — OFFICE VISIT (OUTPATIENT)
Dept: NEUROSURGERY | Facility: CLINIC | Age: 60
End: 2025-06-17
Payer: COMMERCIAL

## 2025-06-17 DIAGNOSIS — I67.1 CEREBRAL ANEURYSM: ICD-10-CM

## 2025-06-17 DIAGNOSIS — I60.9 SAH (SUBARACHNOID HEMORRHAGE): ICD-10-CM

## 2025-06-17 DIAGNOSIS — I67.1 ANTERIOR COMMUNICATING ARTERY ANEURYSM: ICD-10-CM

## 2025-06-17 DIAGNOSIS — I67.1 CEREBRAL ANEURYSM, NONRUPTURED: Primary | ICD-10-CM

## 2025-06-17 PROCEDURE — 98007 SYNCH AUDIO-VIDEO EST HI 40: CPT | Mod: 95,,, | Performed by: NEUROLOGICAL SURGERY

## 2025-06-17 PROCEDURE — 3044F HG A1C LEVEL LT 7.0%: CPT | Mod: CPTII,95,, | Performed by: NEUROLOGICAL SURGERY

## 2025-06-17 PROCEDURE — 4010F ACE/ARB THERAPY RXD/TAKEN: CPT | Mod: CPTII,95,, | Performed by: NEUROLOGICAL SURGERY

## 2025-06-17 NOTE — PROGRESS NOTES
The patient location is: [place]  The chief complaint leading to consultation is: ACOM aneurysm    Visit type: audiovisual    Face to Face time with patient: 10 minutes  10 minutes of total time spent on the encounter, which includes face to face time and non-face to face time preparing to see the patient (eg, review of tests), Obtaining and/or reviewing separately obtained history, Documenting clinical information in the electronic or other health record, Independently interpreting results (not separately reported) and communicating results to the patient/family/caregiver, or Care coordination (not separately reported).     Each patient to whom he or she provides medical services by telemedicine is:  (1) informed of the relationship between the physician and patient and the respective role of any other health care provider with respect to management of the patient; and (2) notified that he or she may decline to receive medical services by telemedicine and may withdraw from such care at any time.    Neurosurgery  Established Patient    Scribe Attestation  I, Tara Mitchell, attest that this documentation has been prepared under the direction and in the presence of Lebron Crump MD.    SUBJECTIVE:     HPI 03/11/24  Loreto Goff is a 58 y.o. female presenting today for symptoms post-aneurysm. She reports headaches and dizziness daily. She states that the dizziness is exacerbated by standing up and once she stands up she needs to take a moment to adjust before moving forward. She also states that her neck is very stiff due to her headaches. She denies n/v, numbness, or paresthesias.     Interval Hx 03/11/25  Ms. Goff returns to clinic via virtual visit for f/u after undergoing a cerebral angiogram for evaluation of ACOM aneurysm. Today she reports having headaches that began following aneurysm rupture. HA occur about 3x/week and last all day. Describes HA as feeling like pressure beginning at the back of her  "head and travels forward throughout the day. Has continued to worsen and increase in frequency. She also has intermittent nausea. Nausea does not occur with her HA. Symptoms negatively impact her daily life. No vomiting. Currently on gabapentin.    Interval Hx 06/17/25  Patient returns to clinic virtually with updated MRA Brain for evaluation of ACOM aneurysm s/p coil (01/2024). MRA demonstrates, "Postsurgical changes of prior anterior communicating artery aneurysm endovascular coiling. Stable small focus of residual filling at neck of aneurysm. 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 She continues to have headaches, but is able to tolerate them. No other acute changes or issues to report.    Review of patient's allergies indicates:   Allergen Reactions    Codeine Nausea And Vomiting       Current Medications[1]    Past Medical History:   Diagnosis Date    Diabetes mellitus     Hypertension      Past Surgical History:   Procedure Laterality Date    TUBAL LIGATION Bilateral 1995     Family History    None       Social History     Socioeconomic History    Marital status:      Social Drivers of Health     Financial Resource Strain: Patient Unable To Answer (1/11/2024)    Overall Financial Resource Strain (CARDIA)     Difficulty of Paying Living Expenses: Patient unable to answer   Food Insecurity: No Food Insecurity (6/9/2024)    Received from [facility]    Hunger Vital Sign     Worried About Running Out of Food in the Last Year: Never true     Ran Out of Food in the Last Year: Never true   Transportation Needs: No Transportation Needs (6/9/2024)    Received from [facility]    PRAPARE - Transportation     Lack of Transportation (Medical): No     Lack of Transportation (Non-Medical): No   Physical Activity: Patient Unable To Answer (1/11/2024)    Exercise Vital Sign "     Days of Exercise per Week: Patient unable to answer     Minutes of Exercise per Session: Patient unable to answer   Stress: Patient Unable To Answer (1/11/2024)    [employer] - Occupational Stress Questionnaire     Feeling of Stress : Patient unable to answer   Housing Stability: Low Risk  (6/9/2024)    Received from [facility]    Housing Stability Vital Sign     Unable to Pay for Housing in the Last Year: No     Number of Times Moved in the Last Year: 1     Homeless in the Last Year: No     Review of Systems   Neurological:  Positive for headaches.   All other systems reviewed and are negative.    OBJECTIVE:     Vital Signs     There is no height or weight on file to calculate BMI.    Neurosurgery Physical Exam  On virtual audio visual visit   Head is normocephalic atraumatic   Neck is grossly supple  No appreciable labored breathing   Admitting appears to be nontender   Patient is able to move extremities     On virtual audio visual visit the patient's speech is fluent, goal directed without any noted dysarthria or aphasia   Unable to evaluate pupils on virtual audio visual visit   Face is symmetric   Tongue is midline  Unable to evaluate palate   Hearing appears to be intact on virtual audio visual visit to voice   Patient able to move both upper and lower extremities without any gross motor asymmetry on virtual audio visual visit     Diagnostic Results:  I personally reviewed the patient's Diagnostic Imaging.     MRA Brain W Cont 06/11/25  Postsurgical changes of prior anterior communicating artery aneurysm endovascular coiling. Stable small focus of residual filling at neck of aneurysm.  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     IR Angiogram Carotid Cerebral Bilateral 01/30/25  About 1.5 mm x 2 mm residual neck of the previously coiled anterior communicating artery aneurysm.  Dome and body  of the aneurysm is packed with stable coil mass without any concerns.  There were no complications.    ASSESSMENT/PLAN:   60 y/o F with hx of ACOM aneurysm rupture s/p coil (01/11/24) with some evidence of small residual at neck.    Patient presents with headaches, no other focal neurological deficits.     Discussed imaging results of MRA Brain 06/11/25 revealing  Postsurgical changes of prior anterior communicating artery aneurysm endovascular coiling. Stable small focus of residual filling at neck of aneurysm.  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   Discussed imaging results of IR Angiogram 01/30/25 revealing  About 1.5 mm x 2 mm residual neck of the previously coiled anterior communicating artery aneurysm. Dome and body of the aneurysm is packed with stable coil mass without any concerns.  There were no complications.     After discussion with the patient, I recommend  Repeat MRA Brain with contrast to continue noninvasive observation of ACOM aneurysm  F/u in one year  I have answered all of their questions and patient wish to proceed with this plan. We will schedule patient.      Thank you so very much for allowing me to participate in the care of this patient.  Please feel free to call any questions, comments, or concerns.     Lebron Crump MD,MSc  Department of Neurosurgery   Department of Radiology  Department of Neurology  [facility]  [facility]    [facility]   [employer] [facility]     Total time spent in counseling and discussion about further management options including relevant lab work, treatment,  prognosis, medications and intended side effects was more than 60 minutes. More than 50 % of the time was spent in counseling and coordination of care.  I spent a total of 60 minutes on the day of the visit.This includes  face to face time and non-face to face time preparing to see the patient (eg, review of tests), Obtaining and/or reviewing separately obtained history, Documenting clinical information in the electronic or other health record, Independently interpreting resultsand communicating results to the patient/family/caregiver, or Care coordination.     Emmanuelibnilsa Samuel  I, Dr. Lebron Crump personally performed the services described in this documentation. All medical record entries made by the scribe, Tara Mitchell, were at my direction and in my presence.  I have reviewed the chart and agree that the record reflects my personal performance and is accurate and complete.           [1]   Current Outpatient Medications   Medication Sig Dispense Refill    amLODIPine (NORVASC) 5 MG tablet Take 5 mg by mouth.      atomoxetine (STRATTERA) 80 MG capsule Take 80 mg by mouth.      doxepin (SINEQUAN) 75 MG capsule Take 75 mg by mouth every evening.      levothyroxine (SYNTHROID) 25 MCG tablet Take 1 tablet (25 mcg total) by mouth before breakfast. 30 tablet 11    losartan (COZAAR) 50 MG tablet Take 50 mg by mouth.      meloxicam (MOBIC) 15 MG tablet Take 15 mg by mouth.      metFORMIN (GLUCOPHAGE) 500 MG tablet Take 500 mg by mouth 2 (two) times daily.      niMODipine (NIMOTOP) 30 MG Cap Take 2 capsules (60 mg total) by mouth every 4 (four) hours. for 3 days 36 capsule 0    prazosin (MINIPRESS) 2 MG Cap Take 2 mg by mouth every evening.      pregabalin (LYRICA) 225 MG Cap Take 1 capsule (225 mg total) by mouth 2 (two) times daily. 60 capsule 0    senna-docusate 8.6-50 mg (PERICOLACE) 8.6-50 mg per tablet Take 2 tablets by mouth 2 (two) times daily. (Patient not taking: Reported on 3/11/2024) 14 tablet 0    TRINTELLIX 20 mg Tab Take 1 tablet by mouth.       No current facility-administered medications for this visit.

## 2025-08-08 ENCOUNTER — TELEPHONE (OUTPATIENT)
Dept: NEUROSURGERY | Facility: CLINIC | Age: 60
End: 2025-08-08
Payer: COMMERCIAL

## 2025-08-08 NOTE — TELEPHONE ENCOUNTER
Copied from CRM #[id number]. Topic: General Inquiry - Patient Advice  >> Aug 8, 2025  9:44 AM Diane wrote:  Josefa from [facility] center is calling to speak with someone in clinic regarding what type of stent was placed in brain and it is a stat order asking for a call back asap pls advise     PH: [phone]